# Patient Record
Sex: FEMALE | Race: WHITE | Employment: OTHER | URBAN - METROPOLITAN AREA
[De-identification: names, ages, dates, MRNs, and addresses within clinical notes are randomized per-mention and may not be internally consistent; named-entity substitution may affect disease eponyms.]

---

## 2017-03-31 PROBLEM — N95.2 ATROPHIC VAGINITIS: Status: ACTIVE | Noted: 2017-03-31

## 2017-04-04 ENCOUNTER — HOSPITAL ENCOUNTER (OUTPATIENT)
Dept: MAMMOGRAPHY | Age: 67
Discharge: HOME | End: 2017-04-04
Attending: OBSTETRICS & GYNECOLOGY
Payer: MEDICARE

## 2017-04-04 DIAGNOSIS — N63.21 BREAST LUMP ON LEFT SIDE AT 2 O'CLOCK POSITION: ICD-10-CM

## 2017-04-04 PROCEDURE — 77066 DX MAMMO INCL CAD BI: CPT

## 2017-04-04 PROCEDURE — 76642 ULTRASOUND BREAST LIMITED: CPT

## 2017-06-11 ENCOUNTER — HOSPITAL ENCOUNTER (OUTPATIENT)
Dept: SLEEP MEDICINE | Age: 67
Discharge: HOME | End: 2017-06-11
Payer: MEDICARE

## 2017-06-11 PROCEDURE — 95810 POLYSOM 6/> YRS 4/> PARAM: CPT

## 2017-08-02 PROBLEM — G47.30 SLEEP APNEA, UNSPECIFIED: Status: ACTIVE | Noted: 2017-08-02

## 2017-08-03 ENCOUNTER — HOSPITAL ENCOUNTER (OUTPATIENT)
Dept: CT IMAGING | Age: 67
Discharge: HOME | End: 2017-08-03
Attending: INTERNAL MEDICINE
Payer: SELF-PAY

## 2017-08-03 ENCOUNTER — HOSPITAL ENCOUNTER (OUTPATIENT)
Dept: ULTRASOUND IMAGING | Age: 67
Discharge: HOME | End: 2017-08-03
Attending: INTERNAL MEDICINE
Payer: MEDICARE

## 2017-08-03 DIAGNOSIS — R10.11 RUQ PAIN: ICD-10-CM

## 2017-08-03 DIAGNOSIS — Z91.89 CARDIOVASCULAR RISK FACTOR: ICD-10-CM

## 2017-08-03 PROCEDURE — 75571 CT HRT W/O DYE W/CA TEST: CPT

## 2017-08-03 PROCEDURE — 76700 US EXAM ABDOM COMPLETE: CPT

## 2017-08-07 NOTE — PROGRESS NOTES
Let her know coronary calcium score 101. Just above 100. Suggest seeing a cardiologist-suggest seeing [facility] Dr. Briseida Rodriguez even if new appt takes month. He will likely advise her to take a lifelong cholesterol medication since her LDL is moderately elevated. Can I refer her?

## 2017-08-14 NOTE — PROGRESS NOTES
Spoke to patient, message was relayed and understanding was expressed. Patient will discuss referral at her follow up appointment later this month.

## 2017-08-18 PROBLEM — G47.30 SLEEP APNEA, UNSPECIFIED: Status: RESOLVED | Noted: 2017-08-02 | Resolved: 2017-08-18

## 2017-11-22 ENCOUNTER — HOSPITAL ENCOUNTER (OUTPATIENT)
Dept: LAB | Age: 67
Discharge: HOME | End: 2017-11-22

## 2017-11-22 PROCEDURE — 88305 TISSUE EXAM BY PATHOLOGIST: CPT | Performed by: INTERNAL MEDICINE

## 2017-11-22 PROCEDURE — 88312 SPECIAL STAINS GROUP 1: CPT | Performed by: INTERNAL MEDICINE

## 2017-12-08 ENCOUNTER — HOSPITAL ENCOUNTER (OUTPATIENT)
Dept: CT IMAGING | Age: 67
Discharge: HOME | End: 2017-12-08
Attending: INTERNAL MEDICINE
Payer: MEDICARE

## 2017-12-08 DIAGNOSIS — R10.9 LEFT SIDED ABDOMINAL PAIN: ICD-10-CM

## 2017-12-08 DIAGNOSIS — R10.13 EPIGASTRIC PAIN: ICD-10-CM

## 2017-12-08 DIAGNOSIS — R63.4 WEIGHT LOSS: ICD-10-CM

## 2017-12-08 PROCEDURE — 74011636320 HC RX REV CODE- 636/320: Performed by: INTERNAL MEDICINE

## 2017-12-08 PROCEDURE — 74011000258 HC RX REV CODE- 258: Performed by: INTERNAL MEDICINE

## 2017-12-08 PROCEDURE — 74177 CT ABD & PELVIS W/CONTRAST: CPT

## 2017-12-08 RX ORDER — SODIUM CHLORIDE 0.9 % (FLUSH) 0.9 %
10 SYRINGE (ML) INJECTION
Status: COMPLETED | OUTPATIENT
Start: 2017-12-08 | End: 2017-12-08

## 2017-12-08 RX ADMIN — SODIUM CHLORIDE 100 ML: 900 INJECTION, SOLUTION INTRAVENOUS at 14:50

## 2017-12-08 RX ADMIN — DIATRIZOATE MEGLUMINE AND DIATRIZOATE SODIUM 15 ML: 660; 100 LIQUID ORAL; RECTAL at 14:50

## 2017-12-08 RX ADMIN — IOPAMIDOL 100 ML: 755 INJECTION, SOLUTION INTRAVENOUS at 14:50

## 2017-12-08 RX ADMIN — Medication 10 ML: at 14:50

## 2017-12-18 ENCOUNTER — HOSPITAL ENCOUNTER (OUTPATIENT)
Dept: ULTRASOUND IMAGING | Age: 67
Discharge: HOME | End: 2017-12-18
Attending: INTERNAL MEDICINE
Payer: MEDICARE

## 2017-12-18 DIAGNOSIS — R42 DIZZINESS: ICD-10-CM

## 2017-12-18 PROCEDURE — 93880 EXTRACRANIAL BILAT STUDY: CPT

## 2017-12-18 NOTE — PROGRESS NOTES
Spoke topatient, message was relayed and understanding expressed. Patient states that she is on her fourth day of feeling better, she is finally on her way to feeling 100%.

## 2017-12-27 PROBLEM — I25.119 CORONARY ARTERY DISEASE INVOLVING NATIVE CORONARY ARTERY OF NATIVE HEART WITH ANGINA PECTORIS (HCC): Status: ACTIVE | Noted: 2017-12-27

## 2017-12-27 PROBLEM — R53.82 CHRONIC FATIGUE: Status: ACTIVE | Noted: 2017-12-27

## 2018-01-16 PROBLEM — I34.1 MVP (MITRAL VALVE PROLAPSE): Status: ACTIVE | Noted: 2018-01-16

## 2018-01-16 PROBLEM — I25.10 CORONARY ARTERY DISEASE INVOLVING NATIVE CORONARY ARTERY OF NATIVE HEART WITHOUT ANGINA PECTORIS: Status: ACTIVE | Noted: 2017-12-27

## 2018-04-26 ENCOUNTER — APPOINTMENT (RX ONLY)
Dept: URBAN - METROPOLITAN AREA CLINIC 349 | Facility: CLINIC | Age: 68
Setting detail: DERMATOLOGY
End: 2018-04-26

## 2018-04-26 DIAGNOSIS — L82.1 OTHER SEBORRHEIC KERATOSIS: ICD-10-CM

## 2018-04-26 DIAGNOSIS — L71.8 OTHER ROSACEA: ICD-10-CM

## 2018-04-26 DIAGNOSIS — Z71.89 OTHER SPECIFIED COUNSELING: ICD-10-CM

## 2018-04-26 DIAGNOSIS — L82.0 INFLAMED SEBORRHEIC KERATOSIS: ICD-10-CM

## 2018-04-26 DIAGNOSIS — Z85.828 PERSONAL HISTORY OF OTHER MALIGNANT NEOPLASM OF SKIN: ICD-10-CM

## 2018-04-26 PROCEDURE — 17110 DESTRUCTION B9 LES UP TO 14: CPT

## 2018-04-26 PROCEDURE — ? COUNSELING

## 2018-04-26 PROCEDURE — ? PRESCRIPTION

## 2018-04-26 PROCEDURE — ? LIQUID NITROGEN

## 2018-04-26 PROCEDURE — 99202 OFFICE O/P NEW SF 15 MIN: CPT | Mod: 25

## 2018-04-26 RX ORDER — METRONIDAZOLE 7.5 MG/G
CREAM TOPICAL
Qty: 1 | Refills: 4 | Status: ERX

## 2018-04-26 ASSESSMENT — LOCATION SIMPLE DESCRIPTION DERM
LOCATION SIMPLE: LEFT POSTERIOR THIGH
LOCATION SIMPLE: LEFT LOWER BACK
LOCATION SIMPLE: ABDOMEN
LOCATION SIMPLE: RIGHT POSTERIOR THIGH
LOCATION SIMPLE: LEFT THIGH
LOCATION SIMPLE: RIGHT UPPER BACK
LOCATION SIMPLE: LEFT UPPER BACK
LOCATION SIMPLE: LEFT CHEEK
LOCATION SIMPLE: RIGHT THIGH
LOCATION SIMPLE: NOSE
LOCATION SIMPLE: RIGHT FOREARM
LOCATION SIMPLE: ABDOMEN
LOCATION SIMPLE: LEFT THIGH
LOCATION SIMPLE: RIGHT CHEEK
LOCATION SIMPLE: RIGHT CHEEK
LOCATION SIMPLE: RIGHT THIGH
LOCATION SIMPLE: LEFT POSTERIOR THIGH
LOCATION SIMPLE: RIGHT FOREARM

## 2018-04-26 ASSESSMENT — LOCATION DETAILED DESCRIPTION DERM
LOCATION DETAILED: RIGHT VENTRAL PROXIMAL FOREARM
LOCATION DETAILED: NASAL DORSUM
LOCATION DETAILED: EPIGASTRIC SKIN
LOCATION DETAILED: LEFT SUPERIOR MEDIAL MIDBACK
LOCATION DETAILED: RIGHT DISTAL POSTERIOR THIGH
LOCATION DETAILED: LEFT INFERIOR CENTRAL MALAR CHEEK
LOCATION DETAILED: RIGHT MEDIAL UPPER BACK
LOCATION DETAILED: RIGHT VENTRAL PROXIMAL FOREARM
LOCATION DETAILED: LEFT MID-UPPER BACK
LOCATION DETAILED: LEFT ANTERIOR PROXIMAL THIGH
LOCATION DETAILED: LEFT ANTERIOR PROXIMAL THIGH
LOCATION DETAILED: LEFT PROXIMAL POSTERIOR THIGH
LOCATION DETAILED: LEFT PROXIMAL POSTERIOR THIGH
LOCATION DETAILED: RIGHT ANTERIOR PROXIMAL THIGH
LOCATION DETAILED: EPIGASTRIC SKIN
LOCATION DETAILED: RIGHT LATERAL BUCCAL CHEEK
LOCATION DETAILED: RIGHT MEDIAL MALAR CHEEK
LOCATION DETAILED: RIGHT ANTERIOR PROXIMAL THIGH

## 2018-04-26 ASSESSMENT — LOCATION ZONE DERM
LOCATION ZONE: LEG
LOCATION ZONE: TRUNK
LOCATION ZONE: FACE
LOCATION ZONE: FACE
LOCATION ZONE: NOSE
LOCATION ZONE: ARM
LOCATION ZONE: LEG
LOCATION ZONE: ARM
LOCATION ZONE: TRUNK

## 2018-04-26 NOTE — PROCEDURE: LIQUID NITROGEN
Number Of Freeze-Thaw Cycles: 1 freeze-thaw cycle
Render Post-Care Instructions In Note?: no
Detail Level: Detailed
Include Z78.9 (Other Specified Conditions Influencing Health Status) As An Associated Diagnosis?: Yes
Medical Necessity Clause: This procedure was medically necessary because the lesions that were treated were:
Post-Care Instructions: I reviewed with the patient in detail post-care instructions. Patient is to wear sunprotection, and avoid picking at any of the treated lesions. Pt may apply Vaseline to crusted or scabbing areas.
Consent: The patient's consent was obtained including but not limited to risks of crusting, scabbing, blistering, scarring, darker or lighter pigmentary change, recurrence, incomplete removal and infection.
Medical Necessity Information: It is in your best interest to select a reason for this procedure from the list below. All of these items fulfill various CMS LCD requirements except the new and changing color options.

## 2018-06-01 ENCOUNTER — HOSPITAL ENCOUNTER (OUTPATIENT)
Dept: MAMMOGRAPHY | Age: 68
Discharge: HOME | End: 2018-06-01
Attending: OBSTETRICS & GYNECOLOGY
Payer: MEDICARE

## 2018-06-01 DIAGNOSIS — Z12.39 SCREENING FOR BREAST CANCER: ICD-10-CM

## 2018-06-01 PROCEDURE — 77067 SCR MAMMO BI INCL CAD: CPT

## 2018-09-17 PROBLEM — I72.9 ANEURYSM (HCC): Status: ACTIVE | Noted: 2018-09-17

## 2018-10-29 ENCOUNTER — HOSPITAL ENCOUNTER (OUTPATIENT)
Dept: PHYSICAL THERAPY | Age: 68
Discharge: HOME | End: 2018-10-29
Attending: INTERNAL MEDICINE
Payer: MEDICARE

## 2018-10-29 DIAGNOSIS — M54.2 CHRONIC NECK PAIN: ICD-10-CM

## 2018-10-29 DIAGNOSIS — G89.29 CHRONIC NECK PAIN: ICD-10-CM

## 2018-10-29 PROCEDURE — G8981 BODY POS CURRENT STATUS: HCPCS

## 2018-10-29 PROCEDURE — 97161 PT EVAL LOW COMPLEX 20 MIN: CPT

## 2018-10-29 PROCEDURE — G8982 BODY POS GOAL STATUS: HCPCS

## 2018-10-29 PROCEDURE — 97140 MANUAL THERAPY 1/> REGIONS: CPT

## 2018-10-29 NOTE — PROGRESS NOTES
Ambulatory/Rehab Services H2 Model Falls Risk Assessment    Risk Factor Pts. ·   Confusion/Disorientation/Impulsivity  []    4 ·   Symptomatic Depression  []   2 ·   Altered Elimination  []   1 ·   Dizziness/Vertigo  []   1 ·   Gender (Male)  []   1 ·   Any administered antiepileptics (anticonvulsants):  []   2 ·   Any administered benzodiazepines:  []   1 ·   Visual Impairment (specify):  []   1 ·   Portable Oxygen Use  []   1 ·   Orthostatic ? BP  []   1 ·   History of Recent Falls (within 3 mos.)  []   5     Ability to Rise from Chair (choose one) Pts. ·   Ability to rise in a single movement  [x]   0 ·   Pushes up, successful in one attempt  []   1 ·   Multiple attempts, but successful  []   3 ·   Unable to rise without assistance  []   4   Total: (5 or greater = High Risk) 0     Falls Prevention Plan:   []                Physical Limitations to Exercise (specify):   []                Mobility Assistance Device (type):   []                Exercise/Equipment Adaptation (specify):    ©2010 [facility] [address] Patent #[id number].  Federal Law prohibits the replication, distribution or use without written permission from [facility] [employer]

## 2018-10-29 NOTE — THERAPY EVALUATION
Emmanuelle Both  : 1950  [place] / Plan[place] PART [place] B / Product Type: Medicare /    [address] [facility], [address], [facility], [address] [place] [address]  [phone]   [phone]          OUTPATIENT PHYSICAL THERAPY:Initial Assessment 10/30/2018     ICD-10: Treatment Diagnosis: Pain in Left Shoulder (M25.512) Cervicalgia (M54.2)  Abnormal posture (R29.3)  thoracic pain     Precautions/Allergies:   Nickel   Fall Risk Score: 1 (? 5 = High Risk)  MD Orders: evaluate and treat MEDICAL/REFERRING DIAGNOSIS:  Chronic neck pain [M54.2, G89.29]  DATE OF ONSET: 6 months   REFERRING PHYSICIAN: Zia Wright MD  RETURN PHYSICIAN APPOINTMENT: not specified      INITIAL ASSESSMENT:  Ms. Claudetta Cope presents with functional limitations due to  Neck and mid back pain. PT evaluation reveals significant intervertebral joint stiffness, muscle imbalances and abnormal postural alinement. Pt responded well after manual techniques today and will highly benefit from skilled PT to address problems below and improve quality of life. PROBLEM LIST (Impacting functional limitations):  1. Decreased Strength  2. Increased Pain  3. Decreased Flexibility/Joint Mobility  4. Decreased Knowledge of Precautions  5. Decreased [place] with Home Exercise Program INTERVENTIONS PLANNED:  1. Cold  2. Heat  3. Home Exercise Program (HEP)  4. Manual Therapy  5. Neuromuscular Re-education/Strengthening  6. Range of Motion (ROM)  7. Therapeutic Exercise/Strengthening     TREATMENT PLAN:  Effective Dates: 10/29/2018 TO 2019 (90 days). Frequency/Duration: 2 times a week for 90 Days  GOALS: (Goals have been discussed and agreed upon with patient.)  SHORT-TERM FUNCTIONAL GOALS: Time Frame: 2-4 weeks   1. Pt will be independent with HEP focusing on core stability and promoting good spinal alignment.   2. Pt will report no symptoms of UE for 1-2 weeks demonstrating centralization of symptoms. 3. Pt will report pain level does not exceed 5/10 in a 1-2 week period of time to demonstrate pain management. DISCHARGE GOALS: Time Frame: 6-8 weeks   1. Pt will improve NDI score by at least 5 points   2. Pt will demonstrate pain free cervical rotation B without report of pain to improve functional mobility. 3. Pt will be able to sustain regular exercise routine including aerobic exercise 3+ times per week without increased symptoms. Rehabilitation Potential For Stated Goals: Good  Regarding Oc Byrnes's therapy, I certify that the treatment plan above will be carried out by a therapist or under their direction. Thank you for this referral,  Suraj Chew, PT     Referring Physician Signature: Valentin Long MD              Date                    The information in this section was collected on 10/29/18 (except where otherwise noted). HISTORY:   History of Present Injury/Illness (Reason for Referral):  Chronic mid thoracic pain for years (mostly right side) most recent MRI t-spine negative for abnormalities (per pt report). MD suggested chiropractor-- receiving weekly chiro adjustments with some thoracic pain relief. Pt states that 6 months ago, left sided neck pain onset. X-rays of cervical spine at chiropractor office showing mild OA, per patient. Chiropractor suggested muscular in nature and recommended PT due to no change in symptoms. Occasionally pain also radiates to left shoulder. Pt states that, 2 weeks ago, heard pop and pain intensified since then. Present symptoms (on day of initial evaluation): constant aching and sharp pain of left sided neck occasionally radiating into arm ; aching sharp pain of central mid thoracic   · Aggravating factors: PM worse, driving, turning head to right.    · Relieving factors: laying down, ice, support   · Pain level: 2/10 presently, 8/10 worst, 1-2/10 best     Past Medical History/Comorbidities:   Ms. Deysi Harrell  has a past medical history of Acne rosacea, Chronic insomnia, Chronic thoracic spine pain, Colon polyps, Coronary Calcium Score 101, Depression, major, single episode, mild ([address] Dyslipidemia, GERD (gastroesophageal reflux disease), Osteoarthritis, generalized, and Sigmoid diverticulosis. Ms. Claudetta Cope  has a past surgical history that includes hx  section (); hx colonoscopy (2016); and hx endoscopy (, ). Social History/Living Environment:     lives with  in two story home      Prior Level of Function/Work/Activity:  , driving, reaching, lifting moderately heavy boxes,     Dominant Side:         RIGHT  Other Clinical Tests:          MRI thoracic, x-rays   Previous Treatment Approaches:          Chiropractic   Current Medications:       Current Outpatient Medications:     ESTRACE 0.01 % (0.1 mg/gram) vaginal cream, Use 1 g vaginally 2-3 nights per week  Brand only please, Disp: 1 Tube, Rfl: 3    naproxen sodium (ALEVE) 220 mg tablet, Take 220 mg by mouth two (2) times daily as needed. , Disp: , Rfl:     lamoTRIgine (LAMICTAL) 25 mg tablet, Take 25 mg by mouth two (2) times a day., Disp: , Rfl:     aspirin delayed-release 81 mg tablet, Take  by mouth daily. , Disp: , Rfl:     zolpidem (AMBIEN) 10 mg tablet, Take 1 Tab by mouth nightly as needed for Sleep. Max Daily Amount: 10 mg., Disp: 30 Tab, Rfl: 3    melatonin tab tablet, Take  by mouth nightly., Disp: , Rfl:     hydrocortisone (PROCTOSOL HC) 2.5 % rectal cream, INSERT CREAM INTO RECTUM FOUR TIMES DAILY AS DIRECTED, Disp: 1 Tube, Rfl: 5    rosuvastatin (CRESTOR) 5 mg tablet, Take 1 Tab by mouth nightly. [facility] Fax - [phone]  , Disp: 90 Tab, Rfl: 3    raNITIdine (ZANTAC) 150 mg tablet, Take 150 mg by mouth., Disp: , Rfl:     metroNIDAZOLE (METROCREAM) 0.75 % topical cream, Apply  to affected area two (2) times a day.  Use a thin layer to affected areas after washing, Disp: 45 g, Rfl: 5    multivitamin (ONE A DAY) tablet, Take 1 Tab by mouth daily. , Disp: , Rfl:    Date Last Reviewed:  10/29/2018     Number of Personal Factors/Comorbidities that affect the Plan of Care: 0: LOW COMPLEXITY   EXAMINATION:   Observation/Orthostatic Postural Assessment:         Rounded shoulders, forward head, increased kyphosis noted of thoracic spine        Increased tone of left upper trap, compared to right    Palpation:      Increased tone and tenderness of left upper trap         ROM:     Eval Date: 10/29/18 Re-Assess date:   Joint:     Active ROM     Cervical Extension 50% cs    Cervical Flexion 75%    Cervical Sidebending R: 50% cs  L: 75%    Cervical Rotation R: 50% cs  L: 75%    PAIVMs  C7 stiff with SP left  mobilization     Shoulder Flexion L: mild limitation   R: WNL    Shoulder Abduction L: mild limitation   R: mild limitation          Thoracic Flexion 100%    Thoracic Extension 25%        Strength:    · B UE 4+/5    Neurological Screen:  Intact     Functional Mobility:         Gait/Ambulation:  Independent and safe        Transfers:  independent and safe      Body Structures Involved:  1. Thoracic Cage  2. Joints  3. Muscles Body Functions Affected:  1. Mental  2. Sensory/Pain  3. Neuromusculoskeletal  4. Movement Related Activities and Participation Affected:  1. General Tasks and Demands  2. Mobility  3. Self Care   Number of elements that affect the Plan of Care: 3: MODERATE COMPLEXITY   CLINICAL PRESENTATION:   Presentation: Evolving clinical presentation with changing clinical characteristics: MODERATE COMPLEXITY   CLINICAL DECISION MAKING:   Outcome Measure: Tool Used: Neck Disability Index (NDI)  Score:  Initial: 15/50  Most Recent: X/50 (Date: -- )   Interpretation of Score: The Neck Disability Index is a revised form of the Oswestry Low Back Pain Index and is designed to measure the activities of daily living in person's with neck pain. Each section is scored on a 0-5 scale, 5 representing the greatest disability.   The scores of each section are added together for a total score of 50. Score 0 1-10 11-20 21-30 31-40 41-49 50   Modifier CH CI CJ CK CL CM CN     ? Changing and Maintaining Body Position:     - CURRENT STATUS: CJ - 20%-39% impaired, limited or restricted    - GOAL STATUS: CI - 1%-19% impaired, limited or restricted    - D/C STATUS:  ---------------To be determined---------------    Medical Necessity:   · Patient is expected to demonstrate progress in strength and range of motion to increase independence with ADLs and recreational activities . Reason for Services/Other Comments:  · Patient continues to require modification of therapeutic interventions to increase complexity of exercises. Use of outcome tool(s) and clinical judgement create a POC that gives a: Clear prediction of patient's progress: LOW COMPLEXITY            TREATMENT:   (In addition to Assessment/Re-Assessment sessions the following treatments were rendered)  Pre-treatment Symptoms/Complaints:  See above. Pain: Initial:     2/10 Post Session:  2/10     [facility]    THERAPEUTIC EXERCISE: (0 minutes):  Exercises per grid below to improve mobility and strength. Required moderate visual, verbal and manual cues to promote proper body alignment, promote proper body posture and promote proper body mechanics. Progressed resistance, range and repetitions as indicated. Date:  10/29/18 Date:   Date:     Activity/Exercise Parameters Parameters Parameters   Education  POC                                            Manual Therapy Interventions: (10 minutes): Manual interventions performed to improve joint/soft tissue mobility and ROM to improve ability to perform ADLs. Patient responded well to all manual interventions with no significant increase in pain/symptoms. Improved pain reported below.        Technique Used Grade  Level # Time(s) Effect while being performed   Seated t-spine extension   III T8-T5 3 Audible cavitation and increased movement    Seated cervical snag  IV  C7 5 Increased cervical ROM        Treatment/Session Assessment:    · Response to Treatment:  Good understanding of HEP and POC. · Compliance with Program/Exercises: Will assess as treatment progresses. · Recommendations/Intent for next treatment session: \"Next visit will focus on advancements to more challenging activities\".     Future Appointments   Date Time Provider Mita Dumont   10/31/2018  9:30 AM Mildred Campo, PT [facility] [place] [facility]   11/5/2018 10:15 AM Mildred Campo, PT [facility]   11/7/2018  9:30 AM Mildred Campo, PT [facility]   11/12/2018 10:15 AM Mildred Campo, PT [facility]   11/14/2018 10:15 AM Mildred Campo, PT [facility]   11/19/2018  9:30 AM Mildred Campo, PT [facility]   11/21/2018 10:15 AM Mildred Campo, PT [facility]   11/26/2018 10:15 AM Mildred Campo, PT [facility]   11/28/2018 10:15 AM Mildred Campo, PT [facility]   12/14/2018 11:00 AM Olga Mcclain MD [place] [facility]   4/3/2019 10:20 AM Karl Ayala MD [place] [facility]       Total Treatment Duration: 10 min (+50 min evaluation)  PT Patient Time In/Time Out  Time In: 1025  Time Out: [address] [facility], PT

## 2018-10-31 ENCOUNTER — HOSPITAL ENCOUNTER (OUTPATIENT)
Dept: PHYSICAL THERAPY | Age: 68
Discharge: HOME | End: 2018-10-31
Payer: MEDICARE

## 2018-10-31 PROCEDURE — 97110 THERAPEUTIC EXERCISES: CPT

## 2018-10-31 NOTE — PROGRESS NOTES
Marknicjeremias Guzman  : 1950  [place] / [place] / Product Type: Medicare /    Pam Castellon at [address] [place] [address]. [facility], [address], [facility], [address] [place] [address]  [phone]   [phone]          OUTPATIENT PHYSICAL THERAPY:Daily Note 10/31/2018     ICD-10: Treatment Diagnosis: Pain in Left Shoulder (M25.512) Cervicalgia (M54.2)  Abnormal posture (R29.3)  thoracic pain     Precautions/Allergies:   Nickel   Fall Risk Score: 1 (? 5 = High Risk)  MD Orders: evaluate and treat MEDICAL/REFERRING DIAGNOSIS:   Chronic neck pain  DATE OF ONSET: 6 months   REFERRING PHYSICIAN: Citlali Morales MD  RETURN PHYSICIAN APPOINTMENT: not specified      INITIAL ASSESSMENT:  Ms. Victorino Klinefelter presents with functional limitations due to  Neck and mid back pain. PT evaluation reveals significant intervertebral joint stiffness, muscle imbalances and abnormal postural alinement. Pt responded well after manual techniques today and will highly benefit from skilled PT to address problems below and improve quality of life. PROBLEM LIST (Impacting functional limitations):  1. Decreased Strength  2. Increased Pain  3. Decreased Flexibility/Joint Mobility  4. Decreased Knowledge of Precautions  5. Decreased Melba with Home Exercise Program INTERVENTIONS PLANNED:  1. Cold  2. Heat  3. Home Exercise Program (HEP)  4. Manual Therapy  5. Neuromuscular Re-education/Strengthening  6. Range of Motion (ROM)  7. Therapeutic Exercise/Strengthening     TREATMENT PLAN:  Effective Dates: 10/29/2018 TO 2019 (90 days). Frequency/Duration: 2 times a week for 90 Days  GOALS: (Goals have been discussed and agreed upon with patient.)  SHORT-TERM FUNCTIONAL GOALS: Time Frame: 2-4 weeks   1. Pt will be independent with HEP focusing on core stability and promoting good spinal alignment. 2. Pt will report no symptoms of UE for 1-2 weeks demonstrating centralization of symptoms.   3. Pt No answer. Voicemail is not set up.    will report pain level does not exceed 5/10 in a 1-2 week period of time to demonstrate pain management. DISCHARGE GOALS: Time Frame: 6-8 weeks   1. Pt will improve NDI score by at least 5 points   2. Pt will demonstrate pain free cervical rotation B without report of pain to improve functional mobility. 3. Pt will be able to sustain regular exercise routine including aerobic exercise 3+ times per week without increased symptoms. Rehabilitation Potential For Stated Goals: Good              The information in this section was collected on 10/29/18 (except where otherwise noted). HISTORY:   History of Present Injury/Illness (Reason for Referral):  Chronic mid thoracic pain for years (mostly right side) most recent MRI t-spine negative for abnormalities (per pt report). MD suggested chiropractor-- receiving weekly chiro adjustments with some thoracic pain relief. Pt states that 6 months ago, left sided neck pain onset. X-rays of cervical spine at chiropractor office showing mild OA, per patient. Chiropractor suggested muscular in nature and recommended PT due to no change in symptoms. Occasionally pain also radiates to left shoulder. Pt states that, 2 weeks ago, heard pop and pain intensified since then. Present symptoms (on day of initial evaluation): constant aching and sharp pain of left sided neck occasionally radiating into arm ; aching sharp pain of central mid thoracic   · Aggravating factors: PM worse, driving, turning head to right. · Relieving factors: laying down, ice, support   · Pain level: 2/10 presently, 8/10 worst, 1-2/10 best     Past Medical History/Comorbidities:   Ms. Christa Armijo  has a past medical history of Acne rosacea, Chronic insomnia, Chronic thoracic spine pain, Colon polyps, Coronary Calcium Score 101, Depression, major, single episode, mild ([address] Dyslipidemia, GERD (gastroesophageal reflux disease), Osteoarthritis, generalized, and Sigmoid diverticulosis.   Ms. Giacomo Mendoza  has a past surgical history that includes hx  section (); hx colonoscopy (2016); and hx endoscopy (, ). Social History/Living Environment:     lives with  in two story home      Prior Level of Function/Work/Activity:  , driving, reaching, lifting moderately heavy boxes,     Dominant Side:         RIGHT  Other Clinical Tests:          MRI thoracic, x-rays   Previous Treatment Approaches:          Chiropractic   Current Medications:       Current Outpatient Medications:     ESTRACE 0.01 % (0.1 mg/gram) vaginal cream, Use 1 g vaginally 2-3 nights per week  Brand only please, Disp: 1 Tube, Rfl: 3    naproxen sodium (ALEVE) 220 mg tablet, Take 220 mg by mouth two (2) times daily as needed. , Disp: , Rfl:     lamoTRIgine (LAMICTAL) 25 mg tablet, Take 25 mg by mouth two (2) times a day., Disp: , Rfl:     aspirin delayed-release 81 mg tablet, Take  by mouth daily. , Disp: , Rfl:     zolpidem (AMBIEN) 10 mg tablet, Take 1 Tab by mouth nightly as needed for Sleep. Max Daily Amount: 10 mg., Disp: 30 Tab, Rfl: 3    melatonin tab tablet, Take  by mouth nightly., Disp: , Rfl:     hydrocortisone (PROCTOSOL HC) 2.5 % rectal cream, INSERT CREAM INTO RECTUM FOUR TIMES DAILY AS DIRECTED, Disp: 1 Tube, Rfl: 5    rosuvastatin (CRESTOR) 5 mg tablet, Take 1 Tab by mouth nightly. [facility] Fax - [phone]  , Disp: 90 Tab, Rfl: 3    raNITIdine (ZANTAC) 150 mg tablet, Take 150 mg by mouth., Disp: , Rfl:     metroNIDAZOLE (METROCREAM) 0.75 % topical cream, Apply  to affected area two (2) times a day. Use a thin layer to affected areas after washing, Disp: 45 g, Rfl: 5    multivitamin (ONE A DAY) tablet, Take 1 Tab by mouth daily. , Disp: , Rfl:    Date Last Reviewed:  10/31/2018     EXAMINATION:   Observation/Orthostatic Postural Assessment:         Rounded shoulders, forward head, increased kyphosis noted of thoracic spine        Increased tone of left upper trap, compared to right    Palpation:      Increased tone and tenderness of left upper trap         ROM:     Eval Date: 10/29/18 Re-Assess date:   Joint:     Active ROM     Cervical Extension 50% cs    Cervical Flexion 75%    Cervical Sidebending R: 50% cs  L: 75%    Cervical Rotation R: 50% cs  L: 75%    PAIVMs  C7 stiff with SP left  mobilization     Shoulder Flexion L: mild limitation   R: WNL    Shoulder Abduction L: mild limitation   R: mild limitation          Thoracic Flexion 100%    Thoracic Extension 25%        Strength:    · B UE 4+/5    Neurological Screen:  Intact     Functional Mobility:         Gait/Ambulation:  Independent and safe        Transfers:  independent and safe      Outcome Measure: Tool Used: Neck Disability Index (NDI)  Score:  Initial: 15/50  Most Recent: X/50 (Date: -- )   Interpretation of Score: The Neck Disability Index is a revised form of the Oswestry Low Back Pain Index and is designed to measure the activities of daily living in person's with neck pain. Each section is scored on a 0-5 scale, 5 representing the greatest disability. The scores of each section are added together for a total score of 50. Score 0 1-10 11-20 21-30 31-40 41-49 50   Modifier CH CI CJ CK CL CM CN     ? Changing and Maintaining Body Position:     - CURRENT STATUS: CJ - 20%-39% impaired, limited or restricted    - GOAL STATUS: CI - 1%-19% impaired, limited or restricted    - D/C STATUS:  ---------------To be determined---------------    Medical Necessity:   · Patient is expected to demonstrate progress in strength and range of motion to increase independence with ADLs and recreational activities . Reason for Services/Other Comments:  · Patient continues to require modification of therapeutic interventions to increase complexity of exercises.             TREATMENT:   (In addition to Assessment/Re-Assessment sessions the following treatments were rendered)  Pre-treatment Symptoms/Complaints:  Pt reports that she has minimal pain currently and was not very sore after last visit. Pain: Initial:     2/10 Post Session:  2/10     [employer] Interactive Portal    THERAPEUTIC EXERCISE: (5 minutes):  Exercises per grid below to improve mobility and strength. Required moderate visual, verbal and manual cues to promote proper body alignment, promote proper body posture and promote proper body mechanics. Progressed resistance, range and repetitions as indicated. Date:  10/29/18 Date:  10/31/18 Date:     Activity/Exercise Parameters Parameters Parameters   Education  POC     AROM cervical all planes   NV    AROM thoracic all planes   NV    Diaphragmatic breathing   X 5 min                         Manual Therapy Interventions: ( 40 minutes): Manual interventions performed to improve joint/soft tissue mobility and ROM to improve ability to perform ADLs. Patient responded well to all manual interventions with no significant increase in pain/symptoms. Improved pain reported below. Technique Used Grade  Level # Time(s) Effect while being performed   Seated t-spine extension   III Thoracic - cervical  3 bouts   increased movement    STM  Trigger points of B rhomboids and thoracic para spinals, B upper trap and levator scap   Decreased tone and pain                         Seated cervical snag  IV  C7 5 Increased cervical ROM    Modalities: 5 min   · Ice application thoracic region post treatment    Treatment/Session Assessment:  Pt challenged with relaxing muscles during manual treatment. Frequent cuing needed to breath and decrease muscle guard. Gave handout for diaphragmatic breathing exercise. · Response to Treatment:  Good understanding of HEP. No increased pain. Symmetrical cervcial ROM observed after manual techniques   · Compliance with Program/Exercises: Will assess as treatment progresses. · Recommendations/Intent for next treatment session:  \"Next visit will focus on advancements to more challenging activities\".     Future Appointments   Date Time Provider Mita Dumont   11/5/2018 10:15 AM Mildred Campo, PT [facility]   11/7/2018  9:30 AM Mildred Campo, PT [facility]   11/12/2018 10:15 AM Mildred Campo, PT [facility]   11/14/2018 10:15 AM Mildred Campo, PT [facility]   11/19/2018  9:30 AM Mildred Campo, PT [facility]   11/21/2018 10:15 AM Mildred Campo, PT [facility]   11/26/2018 10:15 AM Mildred Campo, PT [facility]   11/28/2018 10:15 AM Mildred Campo, PT [facility]   12/14/2018 11:00 AM Olga Mcclain MD [facility]   4/3/2019 10:20 AM Karl Marroquin MD [facility]       Total Treatment Duration: 50 min   PT Patient Time In/Time Out  Time In: 1127  [facility] [id number] [facility]

## 2018-11-05 ENCOUNTER — HOSPITAL ENCOUNTER (OUTPATIENT)
Dept: PHYSICAL THERAPY | Age: 68
Discharge: HOME | End: 2018-11-05
Payer: MEDICARE

## 2018-11-05 PROCEDURE — 97110 THERAPEUTIC EXERCISES: CPT

## 2018-11-05 PROCEDURE — 97140 MANUAL THERAPY 1/> REGIONS: CPT

## 2018-11-05 NOTE — PROGRESS NOTES
Bradne Danny  : 1950  [place] / [place] / Product Type: Medicare /    [facility] [address] [facility]. [facility], [address], [facility], [address] [place] [address]  [phone]   [phone]          OUTPATIENT PHYSICAL THERAPY:Daily Note 2018     ICD-10: Treatment Diagnosis: Pain in Left Shoulder (M25.512) Cervicalgia (M54.2)  Abnormal posture (R29.3)  thoracic pain     Precautions/Allergies:   Nickel   Fall Risk Score: 1 (? 5 = High Risk)  MD Orders: evaluate and treat MEDICAL/REFERRING DIAGNOSIS:   Chronic neck pain  DATE OF ONSET: 6 months   REFERRING PHYSICIAN: Wesly Ng MD  RETURN PHYSICIAN APPOINTMENT: not specified      INITIAL ASSESSMENT:  Ms. Marbin Hicks presents with functional limitations due to  Neck and mid back pain. PT evaluation reveals significant intervertebral joint stiffness, muscle imbalances and abnormal postural alinement. Pt responded well after manual techniques today and will highly benefit from skilled PT to address problems below and improve quality of life. PROBLEM LIST (Impacting functional limitations):  1. Decreased Strength  2. Increased Pain  3. Decreased Flexibility/Joint Mobility  4. Decreased Knowledge of Precautions  5. Decreased Turner with Home Exercise Program INTERVENTIONS PLANNED:  1. Cold  2. Heat  3. Home Exercise Program (HEP)  4. Manual Therapy  5. Neuromuscular Re-education/Strengthening  6. Range of Motion (ROM)  7. Therapeutic Exercise/Strengthening     TREATMENT PLAN:  Effective Dates: 10/29/2018 TO 2019 (90 days). Frequency/Duration: 2 times a week for 90 Days  GOALS: (Goals have been discussed and agreed upon with patient.)  SHORT-TERM FUNCTIONAL GOALS: Time Frame: 2-4 weeks   1. Pt will be independent with HEP focusing on core stability and promoting good spinal alignment. 2. Pt will report no symptoms of UE for 1-2 weeks demonstrating centralization of symptoms.   3. Pt will report pain level does not exceed 5/10 in a 1-2 week period of time to demonstrate pain management. DISCHARGE GOALS: Time Frame: 6-8 weeks   1. Pt will improve NDI score by at least 5 points   2. Pt will demonstrate pain free cervical rotation B without report of pain to improve functional mobility. 3. Pt will be able to sustain regular exercise routine including aerobic exercise 3+ times per week without increased symptoms. Rehabilitation Potential For Stated Goals: Good              The information in this section was collected on 10/29/18 (except where otherwise noted). HISTORY:   History of Present Injury/Illness (Reason for Referral):  Chronic mid thoracic pain for years (mostly right side) most recent MRI t-spine negative for abnormalities (per pt report). MD suggested chiropractor-- receiving weekly chiro adjustments with some thoracic pain relief. Pt states that 6 months ago, left sided neck pain onset. X-rays of cervical spine at chiropractor office showing mild OA, per patient. Chiropractor suggested muscular in nature and recommended PT due to no change in symptoms. Occasionally pain also radiates to left shoulder. Pt states that, 2 weeks ago, heard pop and pain intensified since then. Present symptoms (on day of initial evaluation): constant aching and sharp pain of left sided neck occasionally radiating into arm ; aching sharp pain of central mid thoracic   · Aggravating factors: PM worse, driving, turning head to right. · Relieving factors: laying down, ice, support   · Pain level: 2/10 presently, 8/10 worst, 1-2/10 best     Past Medical History/Comorbidities:   Ms. Vicie Duane  has a past medical history of Acne rosacea, Chronic insomnia, Chronic thoracic spine pain, Colon polyps, Coronary Calcium Score 101, Depression, major, single episode, mild ([address] Dyslipidemia, GERD (gastroesophageal reflux disease), Osteoarthritis, generalized, and Sigmoid diverticulosis.   Ms. Kamila Montalvo  has a past surgical history that includes hx  section (); hx colonoscopy (2016); and hx endoscopy (, ). Social History/Living Environment:     lives with  in two story home      Prior Level of Function/Work/Activity:  , driving, reaching, lifting moderately heavy boxes,     Dominant Side:         RIGHT  Other Clinical Tests:          MRI thoracic, x-rays   Previous Treatment Approaches:          Chiropractic   Current Medications:       Current Outpatient Medications:     ESTRACE 0.01 % (0.1 mg/gram) vaginal cream, Use 1 g vaginally 2-3 nights per week  Brand only please, Disp: 1 Tube, Rfl: 3    naproxen sodium (ALEVE) 220 mg tablet, Take 220 mg by mouth two (2) times daily as needed. , Disp: , Rfl:     lamoTRIgine (LAMICTAL) 25 mg tablet, Take 25 mg by mouth two (2) times a day., Disp: , Rfl:     aspirin delayed-release 81 mg tablet, Take  by mouth daily. , Disp: , Rfl:     zolpidem (AMBIEN) 10 mg tablet, Take 1 Tab by mouth nightly as needed for Sleep. Max Daily Amount: 10 mg., Disp: 30 Tab, Rfl: 3    melatonin tab tablet, Take  by mouth nightly., Disp: , Rfl:     hydrocortisone (PROCTOSOL HC) 2.5 % rectal cream, INSERT CREAM INTO RECTUM FOUR TIMES DAILY AS DIRECTED, Disp: 1 Tube, Rfl: 5    rosuvastatin (CRESTOR) 5 mg tablet, Take 1 Tab by mouth nightly. [facility] Fax - [phone]  , Disp: 90 Tab, Rfl: 3    raNITIdine (ZANTAC) 150 mg tablet, Take 150 mg by mouth., Disp: , Rfl:     metroNIDAZOLE (METROCREAM) 0.75 % topical cream, Apply  to affected area two (2) times a day. Use a thin layer to affected areas after washing, Disp: 45 g, Rfl: 5    multivitamin (ONE A DAY) tablet, Take 1 Tab by mouth daily. , Disp: , Rfl:    Date Last Reviewed:  2018     EXAMINATION:   Observation/Orthostatic Postural Assessment:         Rounded shoulders, forward head, increased kyphosis noted of thoracic spine        Increased tone of left upper trap, compared to right    Palpation:      Increased tone and tenderness of left upper trap         ROM:     Eval Date: 10/29/18 Re-Assess date:   Joint:     Active ROM     Cervical Extension 50% cs    Cervical Flexion 75%    Cervical Sidebending R: 50% cs  L: 75%    Cervical Rotation R: 50% cs  L: 75%    PAIVMs  C7 stiff with SP left  mobilization     Shoulder Flexion L: mild limitation   R: WNL    Shoulder Abduction L: mild limitation   R: mild limitation          Thoracic Flexion 100%    Thoracic Extension 25%        Strength:    · B UE 4+/5    Neurological Screen:  Intact     Functional Mobility:         Gait/Ambulation:  Independent and safe        Transfers:  independent and safe      Outcome Measure: Tool Used: Neck Disability Index (NDI)  Score:  Initial: 15/50  Most Recent: X/50 (Date: -- )   Interpretation of Score: The Neck Disability Index is a revised form of the Oswestry Low Back Pain Index and is designed to measure the activities of daily living in person's with neck pain. Each section is scored on a 0-5 scale, 5 representing the greatest disability. The scores of each section are added together for a total score of 50. Score 0 1-10 11-20 21-30 31-40 41-49 50   Modifier CH CI CJ CK CL CM CN     ? Changing and Maintaining Body Position:     - CURRENT STATUS: CJ - 20%-39% impaired, limited or restricted    - GOAL STATUS: CI - 1%-19% impaired, limited or restricted    - D/C STATUS:  ---------------To be determined---------------    Medical Necessity:   · Patient is expected to demonstrate progress in strength and range of motion to increase independence with ADLs and recreational activities . Reason for Services/Other Comments:  · Patient continues to require modification of therapeutic interventions to increase complexity of exercises.             TREATMENT:   (In addition to Assessment/Re-Assessment sessions the following treatments were rendered)  Pre-treatment Symptoms/Complaints: pt states that she was not sore after last session however, over the last 2 days she has had increased left side neck pain. She had to take pain medication this morning. Pain: Initial:     3/10 Post Session:  0/10     [employer] Portal    THERAPEUTIC EXERCISE: (15 minutes):  Exercises per grid below to improve mobility and strength. Required moderate visual, verbal and manual cues to promote proper body alignment, promote proper body posture and promote proper body mechanics. Progressed resistance, range and repetitions as indicated. Date:  10/29/18 Date:  10/31/18 Date:  11/5/18   Activity/Exercise Parameters Parameters Parameters   Education  POC  cause of scalene strain related to stress breathing    AROM cervical all planes   NV NV   AROM thoracic all planes   NV Flex/ext x 10 with guidance    Diaphragmatic breathing   X 5 min X 10 min    UBE   Level 1x 3/3                Access Code: [email or web address]   URL: [email or web address]   Date: 11/05/2018   Prepared by: Vikram Wood     Exercises  Seated Thoracic Flexion and Extension - 5 reps - 5 hold - 2x daily       Manual Therapy Interventions: ( 40 minutes): Manual interventions performed to improve joint/soft tissue mobility and ROM to improve ability to perform ADLs. Patient responded well to all manual interventions with no significant increase in pain/symptoms. Improved pain reported below.        Technique Used Grade  Level # Time(s) Effect while being performed   CPA's  III Thoracic - cervical  3 bouts   increased movement    STM  Trigger points of B rhomboids and thoracic para spinals, B upper trap and levator scap, focusing on left scalenes    Decreased tone and pain    Cervical traction   Mid-lower cerv  Decreased stiffness                  Seated cervical snag        Modalities: 10 min   · Ice application cerv and thoracic region post treatment    Treatment/Session Assessment: signifcant triggerpoints of left scalenes noted today ---focused manual here today.   · Response to Treatment:  Decreased pain level and improved cervical mobility    · Compliance with Program/Exercises: Will assess as treatment progresses. · Recommendations/Intent for next treatment session: \"Next visit will focus on advancements to more challenging activities\".     Future Appointments   Date Time Provider Mita Dumont   11/7/2018  9:30 AM [facility] Paula[facility]   11/12/2018 10:15 AM [facility] Paula[facility]   11/14/2018 10:15 AM Kiesha Paula, PT [facility]   11/19/2018  9:30 AM Kiesha Paula, PT [facility]   11/21/2018 10:15 AM Kiesha Paula, PT [facility]   11/26/2018 10:15 AM Kiesha Paula, PT [facility]   11/28/2018 10:15 AM Kiesha Paula, PT [facility]   12/14/2018 11:00 AM Lamarr Lanes, MD [facility]   4/3/2019 10:20 AM Gia Marroquin MD [facility]       Total Treatment Duration: 55 min   PT Patient Time In/Time Out  Time In: 1020  Time Out: [address], PT

## 2018-11-07 NOTE — PROGRESS NOTES
Migdalia Tavares  : 1950  [place] / [place] / Product Type: Medicare /    [address]. [facility], [address], [facility], [address] [place] [address]  [phone]   [phone]          OUTPATIENT PHYSICAL THERAPY:Daily Note      ICD-10: Treatment Diagnosis: Pain in Left Shoulder (M25.512) Cervicalgia (M54.2)  Abnormal posture (R29.3)  thoracic pain     Precautions/Allergies:   Nickel   Fall Risk Score: 1 (? 5 = High Risk)  MD Orders: evaluate and treat MEDICAL/REFERRING DIAGNOSIS:   Chronic neck pain  DATE OF ONSET: 6 months   REFERRING PHYSICIAN: Janet Buck MD  RETURN PHYSICIAN APPOINTMENT: not specified      INITIAL ASSESSMENT:  Ms. Maki Hernandez presents with functional limitations due to  Neck and mid back pain. PT evaluation reveals significant intervertebral joint stiffness, muscle imbalances and abnormal postural alinement. Pt responded well after manual techniques today and will highly benefit from skilled PT to address problems below and improve quality of life. PROBLEM LIST (Impacting functional limitations):  1. Decreased Strength  2. Increased Pain  3. Decreased Flexibility/Joint Mobility  4. Decreased Knowledge of Precautions  5. Decreased [place] with Home Exercise Program INTERVENTIONS PLANNED:  1. Cold  2. Heat  3. Home Exercise Program (HEP)  4. Manual Therapy  5. Neuromuscular Re-education/Strengthening  6. Range of Motion (ROM)  7. Therapeutic Exercise/Strengthening     TREATMENT PLAN:  Effective Dates: 10/29/2018 TO 2019 (90 days). Frequency/Duration: 2 times a week for 90 Days  GOALS: (Goals have been discussed and agreed upon with patient.)  SHORT-TERM FUNCTIONAL GOALS: Time Frame: 2-4 weeks   1. Pt will be independent with HEP focusing on core stability and promoting good spinal alignment. 2. Pt will report no symptoms of UE for 1-2 weeks demonstrating centralization of symptoms.   3. Pt will report pain level does not exceed 5/10 in a 1-2 week period of time to demonstrate pain management. DISCHARGE GOALS: Time Frame: 6-8 weeks   1. Pt will improve NDI score by at least 5 points   2. Pt will demonstrate pain free cervical rotation B without report of pain to improve functional mobility. 3. Pt will be able to sustain regular exercise routine including aerobic exercise 3+ times per week without increased symptoms. Rehabilitation Potential For Stated Goals: Good              The information in this section was collected on 10/29/18 (except where otherwise noted). HISTORY:   History of Present Injury/Illness (Reason for Referral):  Chronic mid thoracic pain for years (mostly right side) most recent MRI t-spine negative for abnormalities (per pt report). MD suggested chiropractor-- receiving weekly chiro adjustments with some thoracic pain relief. Pt states that 6 months ago, left sided neck pain onset. X-rays of cervical spine at chiropractor office showing mild OA, per patient. Chiropractor suggested muscular in nature and recommended PT due to no change in symptoms. Occasionally pain also radiates to left shoulder. Pt states that, 2 weeks ago, heard pop and pain intensified since then. Present symptoms (on day of initial evaluation): constant aching and sharp pain of left sided neck occasionally radiating into arm ; aching sharp pain of central mid thoracic   · Aggravating factors: PM worse, driving, turning head to right.    · Relieving factors: laying down, ice, support   · Pain level: 2/10 presently, 8/10 worst, 1-2/10 best     Past Medical History/Comorbidities:   Ms. Jared Fernandez  has a past medical history of Acne rosacea, Chronic insomnia, Chronic neck pain, Chronic thoracic spine pain, Colon polyps, Coronary Calcium Score 101, Depression, major, single episode, mild ([address] Dyslipidemia, GERD (gastroesophageal reflux disease), Menopause, Osteoarthritis, generalized, Sigmoid diverticulosis, and Small intestinal bacterial overgrowth. Ms. Claudetta Cope  has a past surgical history that includes hx  section (); hx colonoscopy (2016); and hx endoscopy (, ). Social History/Living Environment:     lives with  in two story home      Prior Level of Function/Work/Activity:  , driving, reaching, lifting moderately heavy boxes,     Dominant Side:         RIGHT  Other Clinical Tests:          MRI thoracic, x-rays   Previous Treatment Approaches:          Chiropractic   Current Medications:       Current Outpatient Medications:     rosuvastatin (CRESTOR) 5 mg tablet, Take 1 Tab by mouth nightly. [facility] Fax - [phone]  , Disp: 90 Tab, Rfl: 0    FLUoxetine (PROZAC) 20 mg capsule, TAKE 1 CAPSULE BY MOUTH ONCE DAILY IN THE MORNING, Disp: , Rfl: 11    zolpidem (AMBIEN) 10 mg tablet, TAKE 1 TABLET BY MOUTH AT BEDTIME, Disp: 30 Tab, Rfl: 3    OTHER,NON-FORMULARY,, , Disp: , Rfl:     glucosamine/chondr guzman A sod (GLUCOSAMINE-CHONDROITIN) 750-600 mg tab, Take  by mouth., Disp: , Rfl:     COQ10, UBIQUINOL, PO, Take  by mouth., Disp: , Rfl:     cholecalciferol (VITAMIN D3) 1,000 unit cap, Take  by mouth daily. , Disp: , Rfl:     AZO CRANBERRY 450-30-50 mg-mg-million tab, Take  by mouth., Disp: , Rfl:     OTHER,NON-FORMULARY,, , Disp: , Rfl:     melatonin tab tablet, Take  by mouth nightly., Disp: , Rfl:     hydrocortisone (PROCTOSOL HC) 2.5 % rectal cream, INSERT CREAM INTO RECTUM FOUR TIMES DAILY AS DIRECTED, Disp: 1 Tube, Rfl: 5    metroNIDAZOLE (METROCREAM) 0.75 % topical cream, Apply  to affected area two (2) times a day. Use a thin layer to affected areas after washing, Disp: 45 g, Rfl: 5    multivitamin (ONE A DAY) tablet, Take 1 Tab by mouth daily. , Disp: , Rfl:    Date Last Reviewed:  2018     EXAMINATION:   Observation/Orthostatic Postural Assessment:         Rounded shoulders, forward head, increased kyphosis noted of thoracic spine        Increased tone of left upper trap, compared to right    Palpation:      Increased tone and tenderness of left upper trap         ROM:     Eval Date: 10/29/18 Re-Assess date:   Joint:     Active ROM     Cervical Extension 50% cs    Cervical Flexion 75%    Cervical Sidebending R: 50% cs  L: 75%    Cervical Rotation R: 50% cs  L: 75%    PAIVMs  C7 stiff with SP left  mobilization     Shoulder Flexion L: mild limitation   R: WNL    Shoulder Abduction L: mild limitation   R: mild limitation          Thoracic Flexion 100%    Thoracic Extension 25%        Strength:    · B UE 4+/5    Neurological Screen:  Intact     Functional Mobility:         Gait/Ambulation:  Independent and safe        Transfers:  independent and safe      Outcome Measure: Tool Used: Neck Disability Index (NDI)  Score:  Initial: 15/50  Most Recent: X/50 (Date: -- )   Interpretation of Score: The Neck Disability Index is a revised form of the Oswestry Low Back Pain Index and is designed to measure the activities of daily living in person's with neck pain. Each section is scored on a 0-5 scale, 5 representing the greatest disability. The scores of each section are added together for a total score of 50. Score 0 1-10 11-20 21-30 31-40 41-49 50   Modifier CH CI CJ CK CL CM CN     ? Changing and Maintaining Body Position:     - CURRENT STATUS: CJ - 20%-39% impaired, limited or restricted    - GOAL STATUS: CI - 1%-19% impaired, limited or restricted    - D/C STATUS:  ---------------To be determined---------------    Medical Necessity:   · Patient is expected to demonstrate progress in strength and range of motion to increase independence with ADLs and recreational activities . Reason for Services/Other Comments:  · Patient continues to require modification of therapeutic interventions to increase complexity of exercises.             TREATMENT:   (In addition to Assessment/Re-Assessment sessions the following treatments were rendered)  Pre-treatment Symptoms/Complaints:pt states that she felt great with less pain right after last session but then flared up later after she was up shopping a while. Pt reports pain has moved lower and to the left at thoracic region and is unsure why. Trying to restart her walking routine but 1 hour of walking made her left side of neck hurt. Pain: Initial:     3/10 Post Session:  0/10     [employer] Portal    THERAPEUTIC EXERCISE: (15 minutes):  Exercises per grid below to improve mobility and strength. Required moderate visual, verbal and manual cues to promote proper body alignment, promote proper body posture and promote proper body mechanics. Progressed resistance, range and repetitions as indicated. Date:  10/29/18 Date:  10/31/18 Date:  11/5/18 11/7/18   Activity/Exercise Parameters Parameters Parameters    Education  POC  cause of scalene strain related to stress breathing     AROM cervical all planes   NV NV    AROM thoracic all planes   NV Flex/ext x 10 with guidance  2 x 5    Diaphragmatic breathing   X 5 min X 10 min     UBE   Level 1x 3/3  Level 2 3/3                  Access Code: [email or web address]   URL: [email or web address]   Date: 11/05/2018   Prepared by: Kalin Venegas     Exercises  Seated Thoracic Flexion and Extension - 5 reps - 5 hold - 2x daily       Manual Therapy Interventions: ( 30 minutes): Manual interventions performed to improve joint/soft tissue mobility and ROM to improve ability to perform ADLs. Patient responded well to all manual interventions with no significant increase in pain/symptoms. Improved pain reported below.        Technique Used Grade  Level # Time(s) Effect while being performed   CPA's  III Thoracic - cervical  3 bouts   increased movement    STM  Trigger points of B rhomboids and thoracic para spinals, B upper trap and levator scap, focusing on left scalenes    Decreased tone and pain    Cervical traction   Mid-lower cerv  Decreased stiffness                  Seated cervical snag        Modalities: 10 min   · Ice application cerv and thoracic region post treatment    Treatment/Session Assessment: improved thoracic ROM with HEP today. Some cuing needed to promote proper mechanics. signifcant triggerpoints of left scalenes noted but less than last session-    I encouraged pt to continue her walking program but due to increasing symptoms, she needs to decrease time to 30 min instead of an hour. · Response to Treatment:  Decreased pain level and improved cervical mobility    · Compliance with Program/Exercises: Will assess as treatment progresses. · Recommendations/Intent for next treatment session: \"Next visit will focus on advancements to more challenging activities\".     Future Appointments   Date Time Provider Mita Julia   11/15/2019 10:30 AM Noel Parikh MD [facility]   6/8/2020  9:30 AM Jessica Marroquin MD [facility]       Total Treatment Duration: 55 min   PT Patient Time In/Time Out  Time In: 1020  Time Out: [address] [facility],5Th Floor, [facility],

## 2018-11-08 ENCOUNTER — HOSPITAL ENCOUNTER (OUTPATIENT)
Dept: PHYSICAL THERAPY | Age: 68
Discharge: HOME | End: 2018-11-08
Payer: MEDICARE

## 2018-11-08 PROCEDURE — 97110 THERAPEUTIC EXERCISES: CPT

## 2018-11-08 PROCEDURE — 97140 MANUAL THERAPY 1/> REGIONS: CPT

## 2018-11-12 ENCOUNTER — HOSPITAL ENCOUNTER (OUTPATIENT)
Dept: PHYSICAL THERAPY | Age: 68
Discharge: HOME | End: 2018-11-12
Payer: MEDICARE

## 2018-11-12 PROCEDURE — 97140 MANUAL THERAPY 1/> REGIONS: CPT

## 2018-11-12 PROCEDURE — 97110 THERAPEUTIC EXERCISES: CPT

## 2018-11-12 NOTE — PROGRESS NOTES
Cait Andrew  : 1950  [place] / [place] / Product Type: Medicare /    [address] [facility] at [address] [place] [address]. [facility], [address], [facility], [address] [place] [address]  [phone]   [phone]          OUTPATIENT PHYSICAL THERAPY:Daily Note 2018     ICD-10: Treatment Diagnosis: Pain in Left Shoulder (M25.512) Cervicalgia (M54.2)  Abnormal posture (R29.3)  thoracic pain     Precautions/Allergies:   Nickel   Fall Risk Score: 1 (? 5 = High Risk)  MD Orders: evaluate and treat MEDICAL/REFERRING DIAGNOSIS:  Chronic neck pain  DATE OF ONSET: 6 months   REFERRING PHYSICIAN: Lili Danielle MD  RETURN PHYSICIAN APPOINTMENT: not specified      INITIAL ASSESSMENT:  Ms. Shruthi Lay presents with functional limitations due to  Neck and mid back pain. PT evaluation reveals significant intervertebral joint stiffness, muscle imbalances and abnormal postural alinement. Pt responded well after manual techniques today and will highly benefit from skilled PT to address problems below and improve quality of life. PROBLEM LIST (Impacting functional limitations):  1. Decreased Strength  2. Increased Pain  3. Decreased Flexibility/Joint Mobility  4. Decreased Knowledge of Precautions  5. Decreased [place] with Home Exercise Program INTERVENTIONS PLANNED:  1. Cold  2. Heat  3. Home Exercise Program (HEP)  4. Manual Therapy  5. Neuromuscular Re-education/Strengthening  6. Range of Motion (ROM)  7. Therapeutic Exercise/Strengthening     TREATMENT PLAN:  Effective Dates: 10/29/2018 TO 2019 (90 days). Frequency/Duration: 2 times a week for 90 Days  GOALS: (Goals have been discussed and agreed upon with patient.)  SHORT-TERM FUNCTIONAL GOALS: Time Frame: 2-4 weeks   1. Pt will be independent with HEP focusing on core stability and promoting good spinal alignment. 2. Pt will report no symptoms of UE for 1-2 weeks demonstrating centralization of symptoms.   3. Pt will report pain level does not exceed 5/10 in a 1-2 week period of time to demonstrate pain management. DISCHARGE GOALS: Time Frame: 6-8 weeks   1. Pt will improve NDI score by at least 5 points   2. Pt will demonstrate pain free cervical rotation B without report of pain to improve functional mobility. 3. Pt will be able to sustain regular exercise routine including aerobic exercise 3+ times per week without increased symptoms. Rehabilitation Potential For Stated Goals: Good              The information in this section was collected on 10/29/18 (except where otherwise noted). HISTORY:   History of Present Injury/Illness (Reason for Referral):  Chronic mid thoracic pain for years (mostly right side) most recent MRI t-spine negative for abnormalities (per pt report). MD suggested chiropractor-- receiving weekly chiro adjustments with some thoracic pain relief. Pt states that 6 months ago, left sided neck pain onset. X-rays of cervical spine at chiropractor office showing mild OA, per patient. Chiropractor suggested muscular in nature and recommended PT due to no change in symptoms. Occasionally pain also radiates to left shoulder. Pt states that, 2 weeks ago, heard pop and pain intensified since then. Present symptoms (on day of initial evaluation): constant aching and sharp pain of left sided neck occasionally radiating into arm ; aching sharp pain of central mid thoracic   · Aggravating factors: PM worse, driving, turning head to right. · Relieving factors: laying down, ice, support   · Pain level: 2/10 presently, 8/10 worst, 1-2/10 best     Past Medical History/Comorbidities:   Ms. Melvin Patel  has a past medical history of Acne rosacea, Chronic insomnia, Chronic thoracic spine pain, Colon polyps, Coronary Calcium Score 101, Depression, major, single episode, mild ([facility] [address] Dyslipidemia, GERD (gastroesophageal reflux disease), Osteoarthritis, generalized, and Sigmoid diverticulosis.   Ms. Marbin Hicks  has a past surgical history that includes hx  section (); hx colonoscopy (2016); and hx endoscopy (, ). Social History/Living Environment:     lives with  in two story home      Prior Level of Function/Work/Activity:  , driving, reaching, lifting moderately heavy boxes,     Dominant Side:         RIGHT  Other Clinical Tests:          MRI thoracic, x-rays   Previous Treatment Approaches:          Chiropractic   Current Medications:       Current Outpatient Medications:     fluconazole (DIFLUCAN) 150 mg tablet, Take 1 tablet po today then repeat in four days, Disp: 2 Tab, Rfl: 0    nystatin (MYCOSTATIN) topical cream, Apply  to affected area two (2) times a day., Disp: 15 g, Rfl: 0    ESTRACE 0.01 % (0.1 mg/gram) vaginal cream, Use 1 g vaginally 2-3 nights per week  Brand only please, Disp: 1 Tube, Rfl: 3    naproxen sodium (ALEVE) 220 mg tablet, Take 220 mg by mouth two (2) times daily as needed. , Disp: , Rfl:     lamoTRIgine (LAMICTAL) 25 mg tablet, Take 25 mg by mouth two (2) times a day., Disp: , Rfl:     aspirin delayed-release 81 mg tablet, Take  by mouth daily. , Disp: , Rfl:     zolpidem (AMBIEN) 10 mg tablet, Take 1 Tab by mouth nightly as needed for Sleep. Max Daily Amount: 10 mg., Disp: 30 Tab, Rfl: 3    melatonin tab tablet, Take  by mouth nightly., Disp: , Rfl:     hydrocortisone (PROCTOSOL HC) 2.5 % rectal cream, INSERT CREAM INTO RECTUM FOUR TIMES DAILY AS DIRECTED, Disp: 1 Tube, Rfl: 5    rosuvastatin (CRESTOR) 5 mg tablet, Take 1 Tab by mouth nightly. [facility] Fax - [phone]  , Disp: 90 Tab, Rfl: 3    raNITIdine (ZANTAC) 150 mg tablet, Take 150 mg by mouth., Disp: , Rfl:     metroNIDAZOLE (METROCREAM) 0.75 % topical cream, Apply  to affected area two (2) times a day. Use a thin layer to affected areas after washing, Disp: 45 g, Rfl: 5    multivitamin (ONE A DAY) tablet, Take 1 Tab by mouth daily. , Disp: , Rfl:    Date Last Reviewed:  11/12/2018     EXAMINATION:   Observation/Orthostatic Postural Assessment:         Rounded shoulders, forward head, increased kyphosis noted of thoracic spine        Increased tone of left upper trap, compared to right    Palpation:      Increased tone and tenderness of left upper trap         ROM:     Eval Date: 10/29/18 Re-Assess date:   Joint:     Active ROM     Cervical Extension 50% cs    Cervical Flexion 75%    Cervical Sidebending R: 50% cs  L: 75%    Cervical Rotation R: 50% cs  L: 75%    PAIVMs  C7 stiff with SP left  mobilization     Shoulder Flexion L: mild limitation   R: WNL    Shoulder Abduction L: mild limitation   R: mild limitation          Thoracic Flexion 100%    Thoracic Extension 25%        Strength:    · B UE 4+/5    Neurological Screen:  Intact     Functional Mobility:         Gait/Ambulation:  Independent and safe        Transfers:  independent and safe      Outcome Measure: Tool Used: Neck Disability Index (NDI)  Score:  Initial: 15/50  Most Recent: X/50 (Date: -- )   Interpretation of Score: The Neck Disability Index is a revised form of the Oswestry Low Back Pain Index and is designed to measure the activities of daily living in person's with neck pain. Each section is scored on a 0-5 scale, 5 representing the greatest disability. The scores of each section are added together for a total score of 50. Score 0 1-10 11-20 21-30 31-40 41-49 50   Modifier CH CI CJ CK CL CM CN     ? Changing and Maintaining Body Position:     - CURRENT STATUS: CJ - 20%-39% impaired, limited or restricted    - GOAL STATUS: CI - 1%-19% impaired, limited or restricted    - D/C STATUS:  ---------------To be determined---------------    Medical Necessity:   · Patient is expected to demonstrate progress in strength and range of motion to increase independence with ADLs and recreational activities .   Reason for Services/Other Comments:  · Patient continues to require modification of therapeutic interventions to increase complexity of exercises. TREATMENT:   (In addition to Assessment/Re-Assessment sessions the following treatments were rendered)  Pre-treatment Symptoms/Complaints:pt states that she felt pretty good after session and decided the next morning to perform some heavy house work. No pain during housework but few hours later had to take pain medication. Next day felt good--less than 24 hour recovery. Pain: Initial:     2/10 Post Session:  0/10     [employer] Portal    THERAPEUTIC EXERCISE: (25 minutes):  Exercises per grid below to improve mobility and strength. Required moderate visual, verbal and manual cues to promote proper body alignment, promote proper body posture and promote proper body mechanics. Progressed resistance, range and repetitions as indicated. Date:  10/29/18 Date:  10/31/18 Date:  11/5/18 11/12/18   Activity/Exercise Parameters Parameters Parameters    Education  POC  cause of scalene strain related to stress breathing     AROM cervical all planes   NV NV X 3, 10 sec all   AROM thoracic all planes   NV Flex/ext x 10 with guidance  X 5 F/E   Diaphragmatic breathing   X 5 min X 10 min  X 5 min   UBE   Level 1x 3/3  Level 3, 3/3                 Access Code: [email or web address]   URL: [email or web address]   Date: 11/05/2018   Prepared by: cAe Trejo          Manual Therapy Interventions: ( 35 minutes): Manual interventions performed to improve joint/soft tissue mobility and ROM to improve ability to perform ADLs. Patient responded well to all manual interventions with no significant increase in pain/symptoms. Improved pain reported below.        Technique Used Grade  Level # Time(s) Effect while being performed   CPA's  III Thoracic - cervical  3 bouts   increased movement    STM  Trigger points of B rhomboids and thoracic para spinals, B upper trap and levator scap, focusing on left scalenes    Decreased tone and pain    Cervical traction Mid-lower cerv  Decreased stiffness                  Seated cervical snag        Modalities: 10 min   · Ice application cerv and thoracic region post treatment    Treatment/Session Assessment: continued signifcant triggerpoints of left scalenes noted today. Pt required significant cuing for thoracic flex/ext again today. · Response to Treatment:  Decreased pain level and improved cervical mobility    · Compliance with Program/Exercises: Will assess as treatment progresses. · Recommendations/Intent for next treatment session: \"Next visit will focus on advancements to more challenging activities\".     Future Appointments   Date Time Provider Mita Dumont   11/14/2018 10:15 AM Kiesha Paula, PT [facility]   11/19/2018  9:30 AM Kiesha Paula, PT [facility]   11/21/2018 10:15 AM Kiesha Paula, PT [facility]   11/26/2018 10:15 AM Kiesha Paula, PT [facility]   11/28/2018 10:15 AM Kiesha Paula, PT [facility]   12/14/2018 11:00 AM Lamarr Lanes, MD [facility]   4/3/2019 10:20 AM Gia Marroquin MD [facility]       Total Treatment Duration: 65 min   PT Patient Time In/Time Out  Time In: 1020  Time Out: [address] [facility], PT

## 2018-11-14 ENCOUNTER — HOSPITAL ENCOUNTER (OUTPATIENT)
Dept: PHYSICAL THERAPY | Age: 68
Discharge: HOME | End: 2018-11-14
Payer: MEDICARE

## 2018-11-14 PROCEDURE — 97140 MANUAL THERAPY 1/> REGIONS: CPT

## 2018-11-14 PROCEDURE — 97110 THERAPEUTIC EXERCISES: CPT

## 2018-11-14 NOTE — PROGRESS NOTES
Eze Valadez  : 1950  [place] / [place] / Product Type: Medicare /    [facility] at [address] [place] [address]. [facility], [address], [facility], [address] [place] [address]  [phone]   [phone]          OUTPATIENT PHYSICAL THERAPY:Daily Note 2018     ICD-10: Treatment Diagnosis: Pain in Left Shoulder (M25.512) Cervicalgia (M54.2)  Abnormal posture (R29.3)  thoracic pain     Precautions/Allergies:   Nickel   Fall Risk Score: 1 (? 5 = High Risk)  MD Orders: evaluate and treat MEDICAL/REFERRING DIAGNOSIS:  Chronic neck pain  DATE OF ONSET: 6 months   REFERRING PHYSICIAN: Shoshana Moon MD  RETURN PHYSICIAN APPOINTMENT: not specified      INITIAL ASSESSMENT:  Ms. Heriberto Monet presents with functional limitations due to  Neck and mid back pain. PT evaluation reveals significant intervertebral joint stiffness, muscle imbalances and abnormal postural alinement. Pt responded well after manual techniques today and will highly benefit from skilled PT to address problems below and improve quality of life. PROBLEM LIST (Impacting functional limitations):  1. Decreased Strength  2. Increased Pain  3. Decreased Flexibility/Joint Mobility  4. Decreased Knowledge of Precautions  5. Decreased [place] with Home Exercise Program INTERVENTIONS PLANNED:  1. Cold  2. Heat  3. Home Exercise Program (HEP)  4. Manual Therapy  5. Neuromuscular Re-education/Strengthening  6. Range of Motion (ROM)  7. Therapeutic Exercise/Strengthening     TREATMENT PLAN:  Effective Dates: 10/29/2018 TO 2019 (90 days). Frequency/Duration: 2 times a week for 90 Days  GOALS: (Goals have been discussed and agreed upon with patient.)  SHORT-TERM FUNCTIONAL GOALS: Time Frame: 2-4 weeks   1. Pt will be independent with HEP focusing on core stability and promoting good spinal alignment. 2. Pt will report no symptoms of UE for 1-2 weeks demonstrating centralization of symptoms.   3. Pt will report pain level does not exceed 5/10 in a 1-2 week period of time to demonstrate pain management. DISCHARGE GOALS: Time Frame: 6-8 weeks   1. Pt will improve NDI score by at least 5 points   2. Pt will demonstrate pain free cervical rotation B without report of pain to improve functional mobility. 3. Pt will be able to sustain regular exercise routine including aerobic exercise 3+ times per week without increased symptoms. Rehabilitation Potential For Stated Goals: Good              The information in this section was collected on 10/29/18 (except where otherwise noted). HISTORY:   History of Present Injury/Illness (Reason for Referral):  Chronic mid thoracic pain for years (mostly right side) most recent MRI t-spine negative for abnormalities (per pt report). MD suggested chiropractor-- receiving weekly chiro adjustments with some thoracic pain relief. Pt states that 6 months ago, left sided neck pain onset. X-rays of cervical spine at chiropractor office showing mild OA, per patient. Chiropractor suggested muscular in nature and recommended PT due to no change in symptoms. Occasionally pain also radiates to left shoulder. Pt states that, 2 weeks ago, heard pop and pain intensified since then. Present symptoms (on day of initial evaluation): constant aching and sharp pain of left sided neck occasionally radiating into arm ; aching sharp pain of central mid thoracic   · Aggravating factors: PM worse, driving, turning head to right. · Relieving factors: laying down, ice, support   · Pain level: 2/10 presently, 8/10 worst, 1-2/10 best     Past Medical History/Comorbidities:   Ms. Ioana Cruz  has a past medical history of Acne rosacea, Chronic insomnia, Chronic thoracic spine pain, Colon polyps, Coronary Calcium Score 101, Depression, major, single episode, mild ([facility] [address] Dyslipidemia, GERD (gastroesophageal reflux disease), Osteoarthritis, generalized, and Sigmoid diverticulosis.   Ms. Fadumo Pink  has a past surgical history that includes hx  section (); hx colonoscopy (2016); and hx endoscopy (, ). Social History/Living Environment:     lives with  in two story home      Prior Level of Function/Work/Activity:  , driving, reaching, lifting moderately heavy boxes,     Dominant Side:         RIGHT  Other Clinical Tests:          MRI thoracic, x-rays   Previous Treatment Approaches:          Chiropractic   Current Medications:       Current Outpatient Medications:     zolpidem (AMBIEN) 10 mg tablet, Take 1 Tab by mouth nightly as needed for Sleep. Max Daily Amount: 10 mg., Disp: 30 Tab, Rfl: 3    fluconazole (DIFLUCAN) 150 mg tablet, Take 1 tablet po today then repeat in four days, Disp: 2 Tab, Rfl: 0    nystatin (MYCOSTATIN) topical cream, Apply  to affected area two (2) times a day., Disp: 15 g, Rfl: 0    ESTRACE 0.01 % (0.1 mg/gram) vaginal cream, Use 1 g vaginally 2-3 nights per week  Brand only please, Disp: 1 Tube, Rfl: 3    naproxen sodium (ALEVE) 220 mg tablet, Take 220 mg by mouth two (2) times daily as needed. , Disp: , Rfl:     lamoTRIgine (LAMICTAL) 25 mg tablet, Take 25 mg by mouth two (2) times a day., Disp: , Rfl:     aspirin delayed-release 81 mg tablet, Take  by mouth daily. , Disp: , Rfl:     melatonin tab tablet, Take  by mouth nightly., Disp: , Rfl:     hydrocortisone (PROCTOSOL HC) 2.5 % rectal cream, INSERT CREAM INTO RECTUM FOUR TIMES DAILY AS DIRECTED, Disp: 1 Tube, Rfl: 5    rosuvastatin (CRESTOR) 5 mg tablet, Take 1 Tab by mouth nightly. [facility] Fax - [phone]  , Disp: 90 Tab, Rfl: 3    raNITIdine (ZANTAC) 150 mg tablet, Take 150 mg by mouth., Disp: , Rfl:     metroNIDAZOLE (METROCREAM) 0.75 % topical cream, Apply  to affected area two (2) times a day. Use a thin layer to affected areas after washing, Disp: 45 g, Rfl: 5    multivitamin (ONE A DAY) tablet, Take 1 Tab by mouth daily. , Disp: , Rfl:    Date Last Reviewed:  11/14/2018     EXAMINATION:   Observation/Orthostatic Postural Assessment:         Rounded shoulders, forward head, increased kyphosis noted of thoracic spine        Increased tone of left upper trap, compared to right    Palpation:      Increased tone and tenderness of left upper trap         ROM:     Eval Date: 10/29/18 Re-Assess date:   Joint:     Active ROM     Cervical Extension 50% cs    Cervical Flexion 75%    Cervical Sidebending R: 50% cs  L: 75%    Cervical Rotation R: 50% cs  L: 75%    PAIVMs  C7 stiff with SP left  mobilization     Shoulder Flexion L: mild limitation   R: WNL    Shoulder Abduction L: mild limitation   R: mild limitation          Thoracic Flexion 100%    Thoracic Extension 25%        Strength:    · B UE 4+/5    Neurological Screen:  Intact     Functional Mobility:         Gait/Ambulation:  Independent and safe        Transfers:  independent and safe      Outcome Measure: Tool Used: Neck Disability Index (NDI)  Score:  Initial: 15/50  Most Recent: X/50 (Date: -- )   Interpretation of Score: The Neck Disability Index is a revised form of the Oswestry Low Back Pain Index and is designed to measure the activities of daily living in person's with neck pain. Each section is scored on a 0-5 scale, 5 representing the greatest disability. The scores of each section are added together for a total score of 50. Score 0 1-10 11-20 21-30 31-40 41-49 50   Modifier CH CI CJ CK CL CM CN     ? Changing and Maintaining Body Position:     - CURRENT STATUS: CJ - 20%-39% impaired, limited or restricted    - GOAL STATUS: CI - 1%-19% impaired, limited or restricted    - D/C STATUS:  ---------------To be determined---------------    Medical Necessity:   · Patient is expected to demonstrate progress in strength and range of motion to increase independence with ADLs and recreational activities .   Reason for Services/Other Comments:  · Patient continues to require modification of therapeutic interventions to increase complexity of exercises. TREATMENT:   (In addition to Assessment/Re-Assessment sessions the following treatments were rendered)  Pre-treatment Symptoms/Complaints: pt states that she got relief from last session next day. Less pian with rotation and housework activities. Pain: Initial:     2/10 Post Session:  0/10     [employer] Portal    THERAPEUTIC EXERCISE: ( 25 minutes):  Exercises per grid below to improve mobility and strength. Required moderate visual, verbal and manual cues to promote proper body alignment, promote proper body posture and promote proper body mechanics. Progressed resistance, range and repetitions as indicated. Date:  10/29/18 Date:  10/31/18 Date:  11/5/18 11/12/18 11/14   Activity/Exercise Parameters Parameters Parameters     Education  POC  cause of scalene strain related to stress breathing      AROM cervical all planes   NV NV X 3, 10 sec all X 3, 10 sec all    AROM thoracic all planes   NV Flex/ext x 10 with guidance  X 5 F/E    Diaphragmatic breathing   X 5 min X 10 min  X 5 min During neck traction    UBE   Level 1x 3/3  Level 3, 3/3 Level 3, 3/3    Chin tuck     NV   Chin tuck head lift      NV   B shoulder ER     NV   Rows        Extension                 Access Code: [email or web address]   URL: [email or web address]   Date: 11/05/2018   Prepared by: Arnel Duckworth      Manual Therapy Interventions: ( 30 minutes): Manual interventions performed to improve joint/soft tissue mobility and ROM to improve ability to perform ADLs. Patient responded well to all manual interventions with no significant increase in pain/symptoms. Improved pain reported below.        Technique Used Grade  Level # Time(s) Effect while being performed   CPA's  III Thoracic - cervical  3 bouts   increased movement    STM  Trigger points of B rhomboids and thoracic para spinals, B upper trap and levator scap, focusing on left scalenes    Decreased tone and pain    Cervical traction   Mid-lower cerv  Decreased stiffness                          Modalities: 0  · Ice application cerv and thoracic region post treatment    Treatment/Session Assessment:  Tolerated all above well. Cuingnneded for chin retraction during cervical AROM as well as postural alignment. · Response to Treatment:   pt with no pain upon departure. · Compliance with Program/Exercises: Will assess as treatment progresses.   · Recommendations/Intent for next treatment session: progress strengthening and postural control     Future Appointments   Date Time Provider Mita Dumont   11/19/2018  9:30 AM Kiesha Paula, PT [facility]   11/21/2018 10:15 AM Jack Merlos PT [facility]   11/26/2018 10:15 AM Kiesha Paula PT [facility]   11/28/2018 10:15 AM Kiesha Paula PT [facility]   12/14/2018 11:00 AM Lamarr Lanes, MD [facility]   4/3/2019 10:20 AM Gia Marroquin MD [employer] [facility]       Total Treatment Duration: 55 min   PT Patient Time In/Time Out  Time In: 2235  Time Out: [address] [facility], [facility]

## 2018-11-19 ENCOUNTER — HOSPITAL ENCOUNTER (OUTPATIENT)
Dept: PHYSICAL THERAPY | Age: 68
Discharge: HOME | End: 2018-11-19
Payer: MEDICARE

## 2018-11-19 PROCEDURE — 97110 THERAPEUTIC EXERCISES: CPT

## 2018-11-19 PROCEDURE — 97140 MANUAL THERAPY 1/> REGIONS: CPT

## 2018-11-19 NOTE — PROGRESS NOTES
Joselyn Haddad  : 1950  [place] / [place] / Product Type: Medicare /    Hafsa Maurizio at [address] [place] [address]. [facility], [address], [facility], [address] [place] [address]  [phone]   [phone]          OUTPATIENT PHYSICAL THERAPY:Daily Note 2018     ICD-10: Treatment Diagnosis: Pain in Left Shoulder (M25.512) Cervicalgia (M54.2)  Abnormal posture (R29.3)  thoracic pain     Precautions/Allergies:   Nickel   Fall Risk Score: 1 (? 5 = High Risk)  MD Orders: evaluate and treat MEDICAL/REFERRING DIAGNOSIS:  Chronic neck pain  DATE OF ONSET: 6 months   REFERRING PHYSICIAN: Christian Brewer MD  RETURN PHYSICIAN APPOINTMENT: not specified      INITIAL ASSESSMENT:  Ms. Ellsworth Seip presents with functional limitations due to  Neck and mid back pain. PT evaluation reveals significant intervertebral joint stiffness, muscle imbalances and abnormal postural alinement. Pt responded well after manual techniques today and will highly benefit from skilled PT to address problems below and improve quality of life. PROBLEM LIST (Impacting functional limitations):  1. Decreased Strength  2. Increased Pain  3. Decreased Flexibility/Joint Mobility  4. Decreased Knowledge of Precautions  5. Decreased [place] with Home Exercise Program INTERVENTIONS PLANNED:  1. Cold  2. Heat  3. Home Exercise Program (HEP)  4. Manual Therapy  5. Neuromuscular Re-education/Strengthening  6. Range of Motion (ROM)  7. Therapeutic Exercise/Strengthening     TREATMENT PLAN:  Effective Dates: 10/29/2018 TO 2019 (90 days). Frequency/Duration: 2 times a week for 90 Days  GOALS: (Goals have been discussed and agreed upon with patient.)  SHORT-TERM FUNCTIONAL GOALS: Time Frame: 2-4 weeks   1. Pt will be independent with HEP focusing on core stability and promoting good spinal alignment. 2. Pt will report no symptoms of UE for 1-2 weeks demonstrating centralization of symptoms.   3. Pt will report pain level does not exceed 5/10 in a 1-2 week period of time to demonstrate pain management. DISCHARGE GOALS: Time Frame: 6-8 weeks   1. Pt will improve NDI score by at least 5 points   2. Pt will demonstrate pain free cervical rotation B without report of pain to improve functional mobility. 3. Pt will be able to sustain regular exercise routine including aerobic exercise 3+ times per week without increased symptoms. Rehabilitation Potential For Stated Goals: Good              The information in this section was collected on 10/29/18 (except where otherwise noted). HISTORY:   History of Present Injury/Illness (Reason for Referral):  Chronic mid thoracic pain for years (mostly right side) most recent MRI t-spine negative for abnormalities (per pt report). MD suggested chiropractor-- receiving weekly chiro adjustments with some thoracic pain relief. Pt states that 6 months ago, left sided neck pain onset. X-rays of cervical spine at chiropractor office showing mild OA, per patient. Chiropractor suggested muscular in nature and recommended PT due to no change in symptoms. Occasionally pain also radiates to left shoulder. Pt states that, 2 weeks ago, heard pop and pain intensified since then. Present symptoms (on day of initial evaluation): constant aching and sharp pain of left sided neck occasionally radiating into arm ; aching sharp pain of central mid thoracic   · Aggravating factors: PM worse, driving, turning head to right. · Relieving factors: laying down, ice, support   · Pain level: 2/10 presently, 8/10 worst, 1-2/10 best     Past Medical History/Comorbidities:   Ms. Scot Zambraon  has a past medical history of Acne rosacea, Chronic insomnia, Chronic thoracic spine pain, Colon polyps, Coronary Calcium Score 101, Depression, major, single episode, mild ([place] [address] Dyslipidemia, GERD (gastroesophageal reflux disease), Osteoarthritis, generalized, and Sigmoid diverticulosis.   Ms. Heriberto Monet  has a past surgical history that includes hx  section (); hx colonoscopy (2016); and hx endoscopy (, ). Social History/Living Environment:     lives with  in two story home      Prior Level of Function/Work/Activity:  , driving, reaching, lifting moderately heavy boxes,     Dominant Side:         RIGHT  Other Clinical Tests:          MRI thoracic, x-rays   Previous Treatment Approaches:          Chiropractic   Current Medications:       Current Outpatient Medications:     cephALEXin (KEFLEX) 500 mg capsule, Take 1 Cap by mouth four (4) times daily. , Disp: 40 Cap, Rfl: 0    zolpidem (AMBIEN) 10 mg tablet, Take 1 Tab by mouth nightly as needed for Sleep. Max Daily Amount: 10 mg., Disp: 30 Tab, Rfl: 3    fluconazole (DIFLUCAN) 150 mg tablet, Take 1 tablet po today then repeat in four days, Disp: 2 Tab, Rfl: 0    nystatin (MYCOSTATIN) topical cream, Apply  to affected area two (2) times a day., Disp: 15 g, Rfl: 0    ESTRACE 0.01 % (0.1 mg/gram) vaginal cream, Use 1 g vaginally 2-3 nights per week  Brand only please, Disp: 1 Tube, Rfl: 3    naproxen sodium (ALEVE) 220 mg tablet, Take 220 mg by mouth two (2) times daily as needed. , Disp: , Rfl:     lamoTRIgine (LAMICTAL) 25 mg tablet, Take 25 mg by mouth two (2) times a day., Disp: , Rfl:     aspirin delayed-release 81 mg tablet, Take  by mouth daily. , Disp: , Rfl:     melatonin tab tablet, Take  by mouth nightly., Disp: , Rfl:     hydrocortisone (PROCTOSOL HC) 2.5 % rectal cream, INSERT CREAM INTO RECTUM FOUR TIMES DAILY AS DIRECTED, Disp: 1 Tube, Rfl: 5    rosuvastatin (CRESTOR) 5 mg tablet, Take 1 Tab by mouth nightly. [facility] Fax - [phone]  , Disp: 90 Tab, Rfl: 3    raNITIdine (ZANTAC) 150 mg tablet, Take 150 mg by mouth., Disp: , Rfl:     metroNIDAZOLE (METROCREAM) 0.75 % topical cream, Apply  to affected area two (2) times a day.  Use a thin layer to affected areas after washing, Disp: 45 g, Rfl: 5    multivitamin (ONE A DAY) tablet, Take 1 Tab by mouth daily. , Disp: , Rfl:    Date Last Reviewed:  11/19/2018     EXAMINATION:   Observation/Orthostatic Postural Assessment:         Rounded shoulders, forward head, increased kyphosis noted of thoracic spine        Increased tone of left upper trap, compared to right    Palpation:      Increased tone and tenderness of left upper trap         ROM:     Eval Date: 10/29/18 Re-Assess date:   Joint:     Active ROM     Cervical Extension 50% cs    Cervical Flexion 75%    Cervical Sidebending R: 50% cs  L: 75%    Cervical Rotation R: 50% cs  L: 75%    PAIVMs  C7 stiff with SP left  mobilization     Shoulder Flexion L: mild limitation   R: WNL    Shoulder Abduction L: mild limitation   R: mild limitation          Thoracic Flexion 100%    Thoracic Extension 25%        Strength:    · B UE 4+/5    Neurological Screen:  Intact     Functional Mobility:         Gait/Ambulation:  Independent and safe        Transfers:  independent and safe      Outcome Measure: Tool Used: Neck Disability Index (NDI)  Score:  Initial: 15/50  Most Recent: X/50 (Date: -- )   Interpretation of Score: The Neck Disability Index is a revised form of the Oswestry Low Back Pain Index and is designed to measure the activities of daily living in person's with neck pain. Each section is scored on a 0-5 scale, 5 representing the greatest disability. The scores of each section are added together for a total score of 50. Score 0 1-10 11-20 21-30 31-40 41-49 50   Modifier CH CI CJ CK CL CM CN     ?  Changing and Maintaining Body Position:     - CURRENT STATUS: CJ - 20%-39% impaired, limited or restricted    - GOAL STATUS: CI - 1%-19% impaired, limited or restricted    - D/C STATUS:  ---------------To be determined---------------    Medical Necessity:   · Patient is expected to demonstrate progress in strength and range of motion to increase independence with ADLs and recreational activities . Reason for Services/Other Comments:  · Patient continues to require modification of therapeutic interventions to increase complexity of exercises. TREATMENT:   (In addition to Assessment/Re-Assessment sessions the following treatments were rendered)  Pre-treatment Symptoms/Complaints: pt states that she is doing well today but had some increased pain the rest of the day after last session. Pt states that she has experienced significant relief in AM.  She continues to have some thoracic pain with prolonged upper body activies such as cooking and dish washing. Pain: Initial:     2/10 Post Session:  0/10     [employer] Portal    THERAPEUTIC EXERCISE: ( 25 minutes):  Exercises per grid below to improve mobility and strength. Required moderate visual, verbal and manual cues to promote proper body alignment, promote proper body posture and promote proper body mechanics. Progressed resistance, range and repetitions as indicated. Date:  10/29/18 Date:  10/31/18 Date:  11/5/18 11/12/18 11/14 11/19/18   Activity/Exercise Parameters Parameters Parameters      Education  POC  cause of scalene strain related to stress breathing       AROM cervical all planes   NV NV X 3, 10 sec all X 3, 10 sec all  X 3, 10 sec    AROM thoracic all planes   NV Flex/ext x 10 with guidance  X 5 F/E     Diaphragmatic breathing   X 5 min X 10 min  X 5 min During neck traction     UBE   Level 1x 3/3  Level 3, 3/3 Level 3, 3/3  nustep level 6 x 10 min    Chin tuck     NV    Chin tuck head lift      NV    B shoulder ER     NV    Rows         Extension       Red x 10, 5 sec             Access Code: [email or web address]   URL: [email or web address]   Date: 11/05/2018   Prepared by: Alicia Cruz      Manual Therapy Interventions: ( 20 minutes): Manual interventions performed to improve joint/soft tissue mobility and ROM to improve ability to perform ADLs.  Patient responded well to all manual interventions with no significant increase in pain/symptoms. Improved pain reported below. Technique Used Grade  Level # Time(s) Effect while being performed   CPA's        STM  Trigger points of B rhomboids and thoracic para spinals, B upper trap and levator scap, focusing on left scalenes    Decreased tone and pain    Cervical traction   Mid-lower cerv  Decreased stiffness                          Modalities: 10 min   · Ice application cerv and thoracic region post treatment    Treatment/Session Assessment:  Tolerated all above well. Focused on strengthening scap stabilizers today as thoracic pain most prominent with static positioning suggesting muscle fatigue. · Response to Treatment:   pt with no pain upon departure. · Compliance with Program/Exercises: Will assess as treatment progresses.   · Recommendations/Intent for next treatment session: progress strengthening and postural control     Future Appointments   Date Time Provider Mita Dumont   11/21/2018 10:15 AM [facility]   11/26/2018 10:15 AM Steph Leonardo, PT [facility]   11/28/2018 10:15 AM Steph Leonardo, PT [facility]   12/14/2018 11:00 AM Isabel Linares MD [place] [facility]   4/3/2019 10:20 AM Ry Marroquin MD [place] [facility]       Total Treatment Duration: 55 min   PT Patient Time In/Time Out  Time In: 0930  Time Out: [address], PT

## 2018-11-21 ENCOUNTER — HOSPITAL ENCOUNTER (OUTPATIENT)
Dept: PHYSICAL THERAPY | Age: 68
Discharge: HOME | End: 2018-11-21
Payer: MEDICARE

## 2018-11-21 PROCEDURE — 97110 THERAPEUTIC EXERCISES: CPT

## 2018-11-21 PROCEDURE — 97140 MANUAL THERAPY 1/> REGIONS: CPT

## 2018-11-21 NOTE — PROGRESS NOTES
Soheila Pereirar  : 1950  [place] / [place] / Product Type: Medicare /    [facility] [address] at [address]. [facility], [address], [facility], [address] [place] [address]  [phone]   [phone]          OUTPATIENT PHYSICAL THERAPY:Daily Note 2018     ICD-10: Treatment Diagnosis: Pain in Left Shoulder (M25.512) Cervicalgia (M54.2)  Abnormal posture (R29.3)  thoracic pain     Precautions/Allergies:   Nickel   Fall Risk Score: 1 (? 5 = High Risk)  MD Orders: evaluate and treat MEDICAL/REFERRING DIAGNOSIS:  Chronic neck pain  DATE OF ONSET: 6 months   REFERRING PHYSICIAN: Valentin Long MD  RETURN PHYSICIAN APPOINTMENT: not specified      INITIAL ASSESSMENT:  Ms. Deysi Harrell presents with functional limitations due to  Neck and mid back pain. PT evaluation reveals significant intervertebral joint stiffness, muscle imbalances and abnormal postural alinement. Pt responded well after manual techniques today and will highly benefit from skilled PT to address problems below and improve quality of life. PROBLEM LIST (Impacting functional limitations):  1. Decreased Strength  2. Increased Pain  3. Decreased Flexibility/Joint Mobility  4. Decreased Knowledge of Precautions  5. Decreased [place] with Home Exercise Program INTERVENTIONS PLANNED:  1. Cold  2. Heat  3. Home Exercise Program (HEP)  4. Manual Therapy  5. Neuromuscular Re-education/Strengthening  6. Range of Motion (ROM)  7. Therapeutic Exercise/Strengthening     TREATMENT PLAN:  Effective Dates: 10/29/2018 TO 2019 (90 days). Frequency/Duration: 2 times a week for 90 Days  GOALS: (Goals have been discussed and agreed upon with patient.)  SHORT-TERM FUNCTIONAL GOALS: Time Frame: 2-4 weeks   1. Pt will be independent with HEP focusing on core stability and promoting good spinal alignment. 2. Pt will report no symptoms of UE for 1-2 weeks demonstrating centralization of symptoms.   3. Pt will report pain level does not exceed 5/10 in a 1-2 week period of time to demonstrate pain management. DISCHARGE GOALS: Time Frame: 6-8 weeks   1. Pt will improve NDI score by at least 5 points   2. Pt will demonstrate pain free cervical rotation B without report of pain to improve functional mobility. 3. Pt will be able to sustain regular exercise routine including aerobic exercise 3+ times per week without increased symptoms. Rehabilitation Potential For Stated Goals: Good              The information in this section was collected on 10/29/18 (except where otherwise noted). HISTORY:   History of Present Injury/Illness (Reason for Referral):  Chronic mid thoracic pain for years (mostly right side) most recent MRI t-spine negative for abnormalities (per pt report). MD suggested chiropractor-- receiving weekly chiro adjustments with some thoracic pain relief. Pt states that 6 months ago, left sided neck pain onset. X-rays of cervical spine at chiropractor office showing mild OA, per patient. Chiropractor suggested muscular in nature and recommended PT due to no change in symptoms. Occasionally pain also radiates to left shoulder. Pt states that, 2 weeks ago, heard pop and pain intensified since then. Present symptoms (on day of initial evaluation): constant aching and sharp pain of left sided neck occasionally radiating into arm ; aching sharp pain of central mid thoracic   · Aggravating factors: PM worse, driving, turning head to right. · Relieving factors: laying down, ice, support   · Pain level: 2/10 presently, 8/10 worst, 1-2/10 best     Past Medical History/Comorbidities:   Ms. Viktor Stewart  has a past medical history of Acne rosacea, Chronic insomnia, Chronic thoracic spine pain, Colon polyps, Coronary Calcium Score 101, Depression, major, single episode, mild ([facility] [place] [address] Dyslipidemia, GERD (gastroesophageal reflux disease), Osteoarthritis, generalized, and Sigmoid diverticulosis.   Ms. Irina Acevedo  has a past surgical history that includes hx  section (); hx colonoscopy (2016); and hx endoscopy (, ). Social History/Living Environment:     lives with  in two story home      Prior Level of Function/Work/Activity:  , driving, reaching, lifting moderately heavy boxes,     Dominant Side:         RIGHT  Other Clinical Tests:          MRI thoracic, x-rays   Previous Treatment Approaches:          Chiropractic   Current Medications:       Current Outpatient Medications:     cephALEXin (KEFLEX) 500 mg capsule, Take 1 Cap by mouth four (4) times daily. , Disp: 40 Cap, Rfl: 0    zolpidem (AMBIEN) 10 mg tablet, Take 1 Tab by mouth nightly as needed for Sleep. Max Daily Amount: 10 mg., Disp: 30 Tab, Rfl: 3    fluconazole (DIFLUCAN) 150 mg tablet, Take 1 tablet po today then repeat in four days, Disp: 2 Tab, Rfl: 0    nystatin (MYCOSTATIN) topical cream, Apply  to affected area two (2) times a day., Disp: 15 g, Rfl: 0    ESTRACE 0.01 % (0.1 mg/gram) vaginal cream, Use 1 g vaginally 2-3 nights per week  Brand only please, Disp: 1 Tube, Rfl: 3    naproxen sodium (ALEVE) 220 mg tablet, Take 220 mg by mouth two (2) times daily as needed. , Disp: , Rfl:     lamoTRIgine (LAMICTAL) 25 mg tablet, Take 25 mg by mouth two (2) times a day., Disp: , Rfl:     aspirin delayed-release 81 mg tablet, Take  by mouth daily. , Disp: , Rfl:     melatonin tab tablet, Take  by mouth nightly., Disp: , Rfl:     hydrocortisone (PROCTOSOL HC) 2.5 % rectal cream, INSERT CREAM INTO RECTUM FOUR TIMES DAILY AS DIRECTED, Disp: 1 Tube, Rfl: 5    rosuvastatin (CRESTOR) 5 mg tablet, Take 1 Tab by mouth nightly. [facility] Fax - [phone]  , Disp: 90 Tab, Rfl: 3    raNITIdine (ZANTAC) 150 mg tablet, Take 150 mg by mouth., Disp: , Rfl:     metroNIDAZOLE (METROCREAM) 0.75 % topical cream, Apply  to affected area two (2) times a day.  Use a thin layer to affected areas after washing, Disp: 45 g, Rfl: 5    multivitamin (ONE A DAY) tablet, Take 1 Tab by mouth daily. , Disp: , Rfl:    Date Last Reviewed:  11/21/2018     EXAMINATION:   Observation/Orthostatic Postural Assessment:         Rounded shoulders, forward head, increased kyphosis noted of thoracic spine        Increased tone of left upper trap, compared to right    Palpation:      Increased tone and tenderness of left upper trap         ROM:     Eval Date: 10/29/18 Re-Assess date:   Joint:     Active ROM     Cervical Extension 50% cs    Cervical Flexion 75%    Cervical Sidebending R: 50% cs  L: 75%    Cervical Rotation R: 50% cs  L: 75%    PAIVMs  C7 stiff with SP left  mobilization     Shoulder Flexion L: mild limitation   R: WNL    Shoulder Abduction L: mild limitation   R: mild limitation          Thoracic Flexion 100%    Thoracic Extension 25%        Strength:    · B UE 4+/5    Neurological Screen:  Intact     Functional Mobility:         Gait/Ambulation:  Independent and safe        Transfers:  independent and safe      Outcome Measure: Tool Used: Neck Disability Index (NDI)  Score:  Initial: 15/50  Most Recent: X/50 (Date: -- )   Interpretation of Score: The Neck Disability Index is a revised form of the Oswestry Low Back Pain Index and is designed to measure the activities of daily living in person's with neck pain. Each section is scored on a 0-5 scale, 5 representing the greatest disability. The scores of each section are added together for a total score of 50. Score 0 1-10 11-20 21-30 31-40 41-49 50   Modifier CH CI CJ CK CL CM CN     ?  Changing and Maintaining Body Position:     - CURRENT STATUS: CJ - 20%-39% impaired, limited or restricted    - GOAL STATUS: CI - 1%-19% impaired, limited or restricted    - D/C STATUS:  ---------------To be determined---------------    Medical Necessity:   · Patient is expected to demonstrate progress in strength and range of motion to increase independence with ADLs and recreational activities . Reason for Services/Other Comments:  · Patient continues to require modification of therapeutic interventions to increase complexity of exercises. TREATMENT:   (In addition to Assessment/Re-Assessment sessions the following treatments were rendered)  Pre-treatment Symptoms/Complaints: pt reports she is doing pretty well and was not sore after last session. She is not sure if she is doing her new exercise for HEP correctly. Pain: Initial:     2/10 Post Session:  0/10     [employer] Portal    THERAPEUTIC EXERCISE: ( 30 minutes):  Exercises per grid below to improve mobility and strength. Required moderate visual, verbal and manual cues to promote proper body alignment, promote proper body posture and promote proper body mechanics. Progressed resistance, range and repetitions as indicated. Date:  10/29/18 Date:  10/31/18 Date:  11/5/18 11/12/18 11/14 11/19/18 11/21/18   Activity/Exercise Parameters Parameters Parameters       Education  POC  cause of scalene strain related to stress breathing        AROM cervical all planes   NV NV X 3, 10 sec all X 3, 10 sec all  X 3, 10 sec  X 5, 10 sec    AROM thoracic all planes   NV Flex/ext x 10 with guidance  X 5 F/E   X 2    Diaphragmatic breathing   X 5 min X 10 min  X 5 min During neck traction      UBE   Level 1x 3/3  Level 3, 3/3 Level 3, 3/3  nustep level 6 x 10 min  Level 3, 3/3    Chin tuck     NV  Seated with cervical side bending and rotation    Chin tuck head lift      NV     B shoulder ER     NV  NV   Rows       RTB x 10   Extension       Red x 10, 5 sec  RTB x 15             Access Code: [email or web address]   URL: [email or web address]   Date: 11/21/2018   Prepared by: Miquel Bauman     Exercises  Standing Shoulder Row with Anchored Resistance - 10 reps - 5 hold - 1x daily  Shoulder Extension with Resistance - Neutral - 10 reps - 5 hold - 1x daily  Standing Shoulder External Rotation with Resistance - 10 reps - 5 hold - 1x daily  Seated Cervical Extension AROM - 5 reps - 5 hold - 2x daily  Seated Cervical Rotation AROM - 5 reps - 3 sets - 2x daily  Seated Cervical Sidebending AROM - 5 reps - 5 hold - 1x daily  Seated Cervical Flexion AROM - 5 reps - 5 hold - 1x daily  Seated Thoracic Extension with Hands Behind Neck - 5 reps - 1x daily  Supine Cervical Retraction with Towel - 5 reps - 5 hold - 1x daily  Seated Thoracic Flexion and Extension - 5 reps - 5 hold - 1x daily     Manual Therapy Interventions: ( 20 minutes): Manual interventions performed to improve joint/soft tissue mobility and ROM to improve ability to perform ADLs. Patient responded well to all manual interventions with no significant increase in pain/symptoms. Improved pain reported below. Technique Used Grade  Level # Time(s) Effect while being performed   CPA's  Grade IV-- Mid thoracic to mid cervical region   Decreased stiffness    STM  Trigger points of B  B upper trap and levator scap, focusing on left scalenes    Decreased tone and pain    Cervical traction   Mid-lower cerv  Decreased stiffness                          Modalities: 10 min   · Ice application cerv and thoracic region post treatment    Treatment/Session Assessment: less cuing needed for above cervical exercises but required guidance with T-band exercises. No pain at end of session    · Response to Treatment:   pt with no pain upon departure. · Compliance with Program/Exercises: Will assess as treatment progresses.   · Recommendations/Intent for next treatment session: progress strengthening and postural control     Future Appointments   Date Time Provider Mita Dumont   11/26/2018 10:15 AM Gonzalez Dan, PT [facility]   11/28/2018 10:15 AM Eunice Stover PT [facility]   12/4/2018 10:45 AM Virginia Santamaria MD [facility]   12/14/2018 11:00 AM Malena Shultz MD [place] [facility] [place] [facility] [place] [address] [facility] [address] [facility] [address]   4/3/2019 10:20 AM Sarah Lira MD [place] [facility]       Total Treatment Duration: 55 min PT Patient Time In/Time Out  Time In: 1015  Time Out: [id number] [facility], PT

## 2018-11-26 ENCOUNTER — HOSPITAL ENCOUNTER (OUTPATIENT)
Dept: PHYSICAL THERAPY | Age: 68
Discharge: HOME | End: 2018-11-26
Payer: MEDICARE

## 2018-11-26 PROCEDURE — 97110 THERAPEUTIC EXERCISES: CPT

## 2018-11-26 PROCEDURE — 97140 MANUAL THERAPY 1/> REGIONS: CPT

## 2018-11-26 NOTE — PROGRESS NOTES
Emily Zarate  : 1950  [place] / [place] / Product Type: Medicare /    Jason Clancy at [address] [place] [address]. [facility], [address], [facility], [address] [place] [address]  [phone]   [phone]          OUTPATIENT PHYSICAL THERAPY:Daily Note 2018     ICD-10: Treatment Diagnosis: Pain in Left Shoulder (M25.512) Cervicalgia (M54.2)  Abnormal posture (R29.3)  thoracic pain     Precautions/Allergies:   Nickel   Fall Risk Score: 1 (? 5 = High Risk)  MD Orders: evaluate and treat MEDICAL/REFERRING DIAGNOSIS:  Chronic neck pain  DATE OF ONSET: 6 months   REFERRING PHYSICIAN: Concepcion Sandoval MD  RETURN PHYSICIAN APPOINTMENT: not specified      INITIAL ASSESSMENT:  Ms. Kemi Kelsey presents with functional limitations due to  Neck and mid back pain. PT evaluation reveals significant intervertebral joint stiffness, muscle imbalances and abnormal postural alinement. Pt responded well after manual techniques today and will highly benefit from skilled PT to address problems below and improve quality of life. PROBLEM LIST (Impacting functional limitations):  1. Decreased Strength  2. Increased Pain  3. Decreased Flexibility/Joint Mobility  4. Decreased Knowledge of Precautions  5. Decreased [place] with Home Exercise Program INTERVENTIONS PLANNED:  1. Cold  2. Heat  3. Home Exercise Program (HEP)  4. Manual Therapy  5. Neuromuscular Re-education/Strengthening  6. Range of Motion (ROM)  7. Therapeutic Exercise/Strengthening     TREATMENT PLAN:  Effective Dates: 10/29/2018 TO 2019 (90 days). Frequency/Duration: 2 times a week for 90 Days  GOALS: (Goals have been discussed and agreed upon with patient.)  SHORT-TERM FUNCTIONAL GOALS: Time Frame: 2-4 weeks   1. Pt will be independent with HEP focusing on core stability and promoting good spinal alignment. 2. Pt will report no symptoms of UE for 1-2 weeks demonstrating centralization of symptoms.   3. Pt will report pain level does not exceed 5/10 in a 1-2 week period of time to demonstrate pain management. DISCHARGE GOALS: Time Frame: 6-8 weeks   1. Pt will improve NDI score by at least 5 points   2. Pt will demonstrate pain free cervical rotation B without report of pain to improve functional mobility. 3. Pt will be able to sustain regular exercise routine including aerobic exercise 3+ times per week without increased symptoms. Rehabilitation Potential For Stated Goals: Good              The information in this section was collected on 10/29/18 (except where otherwise noted). HISTORY:   History of Present Injury/Illness (Reason for Referral):  Chronic mid thoracic pain for years (mostly right side) most recent MRI t-spine negative for abnormalities (per pt report). MD suggested chiropractor-- receiving weekly chiro adjustments with some thoracic pain relief. Pt states that 6 months ago, left sided neck pain onset. X-rays of cervical spine at chiropractor office showing mild OA, per patient. Chiropractor suggested muscular in nature and recommended PT due to no change in symptoms. Occasionally pain also radiates to left shoulder. Pt states that, 2 weeks ago, heard pop and pain intensified since then. Present symptoms (on day of initial evaluation): constant aching and sharp pain of left sided neck occasionally radiating into arm ; aching sharp pain of central mid thoracic   · Aggravating factors: PM worse, driving, turning head to right. · Relieving factors: laying down, ice, support   · Pain level: 2/10 presently, 8/10 worst, 1-2/10 best     Past Medical History/Comorbidities:   Ms. Christa Armijo  has a past medical history of Acne rosacea, Chronic insomnia, Chronic thoracic spine pain, Colon polyps, Coronary Calcium Score 101, Depression, major, single episode, mild ([address] Dyslipidemia, GERD (gastroesophageal reflux disease), Osteoarthritis, generalized, and Sigmoid diverticulosis.   Ms. Kemi Kelsey  has a past surgical history that includes hx  section (); hx colonoscopy (2016); and hx endoscopy (, ). Social History/Living Environment:     lives with  in two story home      Prior Level of Function/Work/Activity:  , driving, reaching, lifting moderately heavy boxes,     Dominant Side:         RIGHT  Other Clinical Tests:          MRI thoracic, x-rays   Previous Treatment Approaches:          Chiropractic   Current Medications:       Current Outpatient Medications:     cephALEXin (KEFLEX) 500 mg capsule, Take 1 Cap by mouth four (4) times daily. , Disp: 40 Cap, Rfl: 0    zolpidem (AMBIEN) 10 mg tablet, Take 1 Tab by mouth nightly as needed for Sleep. Max Daily Amount: 10 mg., Disp: 30 Tab, Rfl: 3    fluconazole (DIFLUCAN) 150 mg tablet, Take 1 tablet po today then repeat in four days, Disp: 2 Tab, Rfl: 0    nystatin (MYCOSTATIN) topical cream, Apply  to affected area two (2) times a day., Disp: 15 g, Rfl: 0    ESTRACE 0.01 % (0.1 mg/gram) vaginal cream, Use 1 g vaginally 2-3 nights per week  Brand only please, Disp: 1 Tube, Rfl: 3    naproxen sodium (ALEVE) 220 mg tablet, Take 220 mg by mouth two (2) times daily as needed. , Disp: , Rfl:     lamoTRIgine (LAMICTAL) 25 mg tablet, Take 25 mg by mouth two (2) times a day., Disp: , Rfl:     aspirin delayed-release 81 mg tablet, Take  by mouth daily. , Disp: , Rfl:     melatonin tab tablet, Take  by mouth nightly., Disp: , Rfl:     hydrocortisone (PROCTOSOL HC) 2.5 % rectal cream, INSERT CREAM INTO RECTUM FOUR TIMES DAILY AS DIRECTED, Disp: 1 Tube, Rfl: 5    rosuvastatin (CRESTOR) 5 mg tablet, Take 1 Tab by mouth nightly. [facility] Fax - [phone]  , Disp: 90 Tab, Rfl: 3    raNITIdine (ZANTAC) 150 mg tablet, Take 150 mg by mouth., Disp: , Rfl:     metroNIDAZOLE (METROCREAM) 0.75 % topical cream, Apply  to affected area two (2) times a day.  Use a thin layer to affected areas after washing, Disp: 45 g, Rfl: 5    multivitamin (ONE A DAY) tablet, Take 1 Tab by mouth daily. , Disp: , Rfl:    Date Last Reviewed:  11/26/2018     EXAMINATION:   Observation/Orthostatic Postural Assessment:         Rounded shoulders, forward head, increased kyphosis noted of thoracic spine        Increased tone of left upper trap, compared to right    Palpation:      Increased tone and tenderness of left upper trap         ROM:     Eval Date: 10/29/18 Re-Assess date:   Joint:     Active ROM     Cervical Extension 50% cs    Cervical Flexion 75%    Cervical Sidebending R: 50% cs  L: 75%    Cervical Rotation R: 50% cs  L: 75%    PAIVMs  C7 stiff with SP left  mobilization     Shoulder Flexion L: mild limitation   R: WNL    Shoulder Abduction L: mild limitation   R: mild limitation          Thoracic Flexion 100%    Thoracic Extension 25%        Strength:    · B UE 4+/5    Neurological Screen:  Intact     Functional Mobility:         Gait/Ambulation:  Independent and safe        Transfers:  independent and safe      Outcome Measure: Tool Used: Neck Disability Index (NDI)  Score:  Initial: 15/50  Most Recent: X/50 (Date: -- )   Interpretation of Score: The Neck Disability Index is a revised form of the Oswestry Low Back Pain Index and is designed to measure the activities of daily living in person's with neck pain. Each section is scored on a 0-5 scale, 5 representing the greatest disability. The scores of each section are added together for a total score of 50. Score 0 1-10 11-20 21-30 31-40 41-49 50   Modifier CH CI CJ CK CL CM CN     ?  Changing and Maintaining Body Position:     - CURRENT STATUS: CJ - 20%-39% impaired, limited or restricted    - GOAL STATUS: CI - 1%-19% impaired, limited or restricted    - D/C STATUS:  ---------------To be determined---------------    Medical Necessity:   · Patient is expected to demonstrate progress in strength and range of motion to increase independence with ADLs and recreational activities . Reason for Services/Other Comments:  · Patient continues to require modification of therapeutic interventions to increase complexity of exercises. TREATMENT:   (In addition to Assessment/Re-Assessment sessions the following treatments were rendered)  Pre-treatment Symptoms/Complaints: pt reports she did alright after last session however, was cooking all weekend so had increased pain of mid thoracic and left upper trap and needed pain medication to sleep. Pain: Initial:     2/10 Post Session:  0/10     [employer] Portal    THERAPEUTIC EXERCISE: ( 20 minutes):  Exercises per grid below to improve mobility and strength. Required moderate visual, verbal and manual cues to promote proper body alignment, promote proper body posture and promote proper body mechanics. Progressed resistance, range and repetitions as indicated. Date:  10/29/18 Date:  10/31/18 Date:  11/5/18 11/12/18 11/14 11/19/18 11/27/18   Activity/Exercise Parameters Parameters Parameters       Education  POC  cause of scalene strain related to stress breathing        AROM cervical all planes   NV NV X 3, 10 sec all X 3, 10 sec all  X 3, 10 sec     AROM thoracic all planes   NV Flex/ext x 10 with guidance  X 5 F/E      Diaphragmatic breathing   X 5 min X 10 min  X 5 min During neck traction   During session    UBE   Level 1x 3/3  Level 3, 3/3 Level 3, 3/3  nustep level 6 x 10 min  3/3 level 3    Chin tuck     NV     Chin tuck head lift      NV     B shoulder ER     NV  Yellow band supine x 10     Rows          Extension       Red x 10, 5 sec     Supine B shoulder flexion        With breathing x 10    Access Code: [email or web address]   URL: [email or web address]   Date: 11/05/2018   Prepared by: Jeana Howard      Manual Therapy Interventions: ( 25 minutes): Manual interventions performed to improve joint/soft tissue mobility and ROM to improve ability to perform ADLs.  Patient responded well to all manual interventions with no significant increase in pain/symptoms. Improved pain reported below. Technique Used Grade  Level # Time(s) Effect while being performed   CPA's        STM  Trigger points of B rhomboids and thoracic para spinals, B upper trap and levator scap, focusing on left scalenes    Decreased tone and pain    Cervical traction   Mid-lower cerv  Decreased stiffness    DTM  Left upper trap and left scalenes   Decreased tone, increased movement    Manual muscle lengthening  Static stretch B Upper trap and left levator  3 x 10 sec  Increased ROM cervical            Modalities: 10 min   · Ice application cerv and thoracic region post treatment    Treatment/Session Assessment:  Pt with increased hypertonicity of left upper trap today than has displayed last few sessions. No complaints at end of session and decreased tone noted. · Response to Treatment:   pt with no pain upon departure. · Compliance with Program/Exercises: Will assess as treatment progresses.   · Recommendations/Intent for next treatment session: progress strengthening and postural control     Future Appointments   Date Time Provider Mita Dumont   11/28/2018 10:15 AM Kiesha Paula, DAIS[place] [facility]   12/4/2018 10:45 AM Gia Cespedes MD [facility]   12/14/2018 11:00 AM Lamarr Lanes, MD [place] [facility]   4/3/2019 10:20 AM Gia Marroquin MD [place] [facility]       Total Treatment Duration: 55 min   PT Patient Time In/Time Out  Time In: 1015  Time Out: [id number] [facility],

## 2018-11-28 ENCOUNTER — HOSPITAL ENCOUNTER (OUTPATIENT)
Dept: PHYSICAL THERAPY | Age: 68
Discharge: HOME | End: 2018-11-28
Payer: MEDICARE

## 2018-11-28 PROCEDURE — 97110 THERAPEUTIC EXERCISES: CPT

## 2018-11-28 PROCEDURE — 97140 MANUAL THERAPY 1/> REGIONS: CPT

## 2018-12-03 ENCOUNTER — HOSPITAL ENCOUNTER (OUTPATIENT)
Dept: PHYSICAL THERAPY | Age: 68
Discharge: HOME | End: 2018-12-03
Payer: MEDICARE

## 2018-12-03 PROCEDURE — 97110 THERAPEUTIC EXERCISES: CPT

## 2018-12-03 PROCEDURE — 97140 MANUAL THERAPY 1/> REGIONS: CPT

## 2018-12-03 NOTE — PROGRESS NOTES
Migdalia Tavares  : 1950  [place] / [place] / Product Type: Medicare /    [facility] at [address] [facility]. [address], [facility], [address] [place] [address]  [phone]   [phone]          OUTPATIENT PHYSICAL THERAPY:Daily Note 2018     ICD-10: Treatment Diagnosis: Pain in Left Shoulder (M25.512) Cervicalgia (M54.2)  Abnormal posture (R29.3)  thoracic pain     Precautions/Allergies:   Nickel   Fall Risk Score: 1 (? 5 = High Risk)  MD Orders: evaluate and treat MEDICAL/REFERRING DIAGNOSIS:  Chronic neck pain  DATE OF ONSET: 6 months   REFERRING PHYSICIAN: Janet Buck MD  RETURN PHYSICIAN APPOINTMENT: not specified      INITIAL ASSESSMENT:  Ms. Maki Hernandez presents with functional limitations due to  Neck and mid back pain. PT evaluation reveals significant intervertebral joint stiffness, muscle imbalances and abnormal postural alinement. Pt responded well after manual techniques today and will highly benefit from skilled PT to address problems below and improve quality of life. PROBLEM LIST (Impacting functional limitations):  1. Decreased Strength  2. Increased Pain  3. Decreased Flexibility/Joint Mobility  4. Decreased Knowledge of Precautions  5. Decreased [place] with Home Exercise Program INTERVENTIONS PLANNED:  1. Cold  2. Heat  3. Home Exercise Program (HEP)  4. Manual Therapy  5. Neuromuscular Re-education/Strengthening  6. Range of Motion (ROM)  7. Therapeutic Exercise/Strengthening     TREATMENT PLAN:  Effective Dates: 10/29/2018 TO 2019 (90 days). Frequency/Duration: 2 times a week for 90 Days  GOALS: (Goals have been discussed and agreed upon with patient.)  SHORT-TERM FUNCTIONAL GOALS: Time Frame: 2-4 weeks   1. Pt will be independent with HEP focusing on core stability and promoting good spinal alignment. 2. Pt will report no symptoms of UE for 1-2 weeks demonstrating centralization of symptoms.   3. Pt will report pain level does not exceed 5/10 in a 1-2 week period of time to demonstrate pain management. DISCHARGE GOALS: Time Frame: 6-8 weeks   1. Pt will improve NDI score by at least 5 points   2. Pt will demonstrate pain free cervical rotation B without report of pain to improve functional mobility. 3. Pt will be able to sustain regular exercise routine including aerobic exercise 3+ times per week without increased symptoms. Rehabilitation Potential For Stated Goals: Good              The information in this section was collected on 10/29/18 (except where otherwise noted). HISTORY:   History of Present Injury/Illness (Reason for Referral):  Chronic mid thoracic pain for years (mostly right side) most recent MRI t-spine negative for abnormalities (per pt report). MD suggested chiropractor-- receiving weekly chiro adjustments with some thoracic pain relief. Pt states that 6 months ago, left sided neck pain onset. X-rays of cervical spine at chiropractor office showing mild OA, per patient. Chiropractor suggested muscular in nature and recommended PT due to no change in symptoms. Occasionally pain also radiates to left shoulder. Pt states that, 2 weeks ago, heard pop and pain intensified since then. Present symptoms (on day of initial evaluation): constant aching and sharp pain of left sided neck occasionally radiating into arm ; aching sharp pain of central mid thoracic   · Aggravating factors: PM worse, driving, turning head to right. · Relieving factors: laying down, ice, support   · Pain level: 2/10 presently, 8/10 worst, 1-2/10 best     Past Medical History/Comorbidities:   Ms. Giacomo Mendoza  has a past medical history of Acne rosacea, Chronic insomnia, Chronic thoracic spine pain, Colon polyps, Coronary Calcium Score 101, Depression, major, single episode, mild ([facility] [address] Dyslipidemia, GERD (gastroesophageal reflux disease), Osteoarthritis, generalized, and Sigmoid diverticulosis.   Ms. Giacomo Mendoza has a past surgical history that includes hx  section (); hx colonoscopy (2016); and hx endoscopy (, ). Social History/Living Environment:     lives with  in two story home      Prior Level of Function/Work/Activity:  , driving, reaching, lifting moderately heavy boxes,     Dominant Side:         RIGHT  Other Clinical Tests:          MRI thoracic, x-rays   Previous Treatment Approaches:          Chiropractic   Current Medications:       Current Outpatient Medications:     traMADol (ULTRAM) 50 mg tablet, Take 50 mg by mouth every six (6) hours as needed for Pain., Disp: , Rfl:     nitrofurantoin (MACRODANTIN) 100 mg capsule, Take 1 Cap by mouth nightly., Disp: 30 Cap, Rfl: 2    cephALEXin (KEFLEX) 500 mg capsule, Take 1 Cap by mouth four (4) times daily. , Disp: 40 Cap, Rfl: 0    zolpidem (AMBIEN) 10 mg tablet, Take 1 Tab by mouth nightly as needed for Sleep. Max Daily Amount: 10 mg., Disp: 30 Tab, Rfl: 3    fluconazole (DIFLUCAN) 150 mg tablet, Take 1 tablet po today then repeat in four days, Disp: 2 Tab, Rfl: 0    nystatin (MYCOSTATIN) topical cream, Apply  to affected area two (2) times a day., Disp: 15 g, Rfl: 0    ESTRACE 0.01 % (0.1 mg/gram) vaginal cream, Use 1 g vaginally 2-3 nights per week  Brand only please, Disp: 1 Tube, Rfl: 3    lamoTRIgine (LAMICTAL) 25 mg tablet, Take 25 mg by mouth two (2) times a day., Disp: , Rfl:     aspirin delayed-release 81 mg tablet, Take  by mouth daily. , Disp: , Rfl:     melatonin tab tablet, Take  by mouth nightly., Disp: , Rfl:     hydrocortisone (PROCTOSOL HC) 2.5 % rectal cream, INSERT CREAM INTO RECTUM FOUR TIMES DAILY AS DIRECTED, Disp: 1 Tube, Rfl: 5    rosuvastatin (CRESTOR) 5 mg tablet, Take 1 Tab by mouth nightly.  [facility] Fax - [phone]  , Disp: 90 Tab, Rfl: 3    raNITIdine (ZANTAC) 150 mg tablet, Take 150 mg by mouth., Disp: , Rfl:     metroNIDAZOLE (METROCREAM) 0.75 % topical cream, Apply  to affected area two (2) times a day. Use a thin layer to affected areas after washing, Disp: 45 g, Rfl: 5    multivitamin (ONE A DAY) tablet, Take 1 Tab by mouth daily. , Disp: , Rfl:    Date Last Reviewed:  12/3/2018     EXAMINATION:   Observation/Orthostatic Postural Assessment:         Rounded shoulders, forward head, increased kyphosis noted of thoracic spine        Increased tone of left upper trap, compared to right    Palpation:      Increased tone and tenderness of left upper trap         ROM:     Eval Date: 10/29/18 Re-Assess date:   Joint:     Active ROM     Cervical Extension 50% cs    Cervical Flexion 75%    Cervical Sidebending R: 50% cs  L: 75%    Cervical Rotation R: 50% cs  L: 75%    PAIVMs  C7 stiff with SP left  mobilization     Shoulder Flexion L: mild limitation   R: WNL    Shoulder Abduction L: mild limitation   R: mild limitation          Thoracic Flexion 100%    Thoracic Extension 25%        Strength:    · B UE 4+/5    Neurological Screen:  Intact     Functional Mobility:         Gait/Ambulation:  Independent and safe        Transfers:  independent and safe      Outcome Measure: Tool Used: Neck Disability Index (NDI)  Score:  Initial: 15/50  Most Recent: X/50 (Date: -- )   Interpretation of Score: The Neck Disability Index is a revised form of the Oswestry Low Back Pain Index and is designed to measure the activities of daily living in person's with neck pain. Each section is scored on a 0-5 scale, 5 representing the greatest disability. The scores of each section are added together for a total score of 50. Score 0 1-10 11-20 21-30 31-40 41-49 50   Modifier CH CI CJ CK CL CM CN     ?  Changing and Maintaining Body Position:    [id number] - CURRENT STATUS: CJ - 20%-39% impaired, limited or restricted    - GOAL STATUS: CI - 1%-19% impaired, limited or restricted    - D/C STATUS:  ---------------To be determined---------------    Medical Necessity:   · Patient is expected to demonstrate progress in strength and range of motion to increase independence with ADLs and recreational activities . Reason for Services/Other Comments:  · Patient continues to require modification of therapeutic interventions to increase complexity of exercises. TREATMENT:   (In addition to Assessment/Re-Assessment sessions the following treatments were rendered)  Pre-treatment Symptoms/Complaints: pt states that she did well after last session but thoracic pain returned with prolonged cooking the next day. Pain: Initial:     2/10 Post Session:  0/10     [employer] Portal    THERAPEUTIC EXERCISE: (15 minutes):  Exercises per grid below to improve mobility and strength. Required moderate visual, verbal and manual cues to promote proper body alignment, promote proper body posture and promote proper body mechanics. Progressed resistance, range and repetitions as indicated. Date:  10/29/18 Date:  10/31/18 Date:  11/5/18 11/12/18 11/14 11/19/18 11/26/18 11/28 12/3/18   Activity/Exercise Parameters Parameters Parameters         Education  POC  cause of scalene strain related to stress breathing      Discussed ginger of t-band anchor for HEP    AROM cervical all planes   NV NV X 3, 10 sec all X 3, 10 sec all  X 3, 10 sec       AROM thoracic all planes   NV Flex/ext x 10 with guidance  X 5 F/E        Diaphragmatic breathing   X 5 min X 10 min  X 5 min During neck traction   During session   During manual    UBE   Level 1x 3/3  Level 3, 3/3 Level 3, 3/3  nustep level 6 x 10 min  3/3 level 3  3/3 level 3  3/3 level 3   Chin tuck     NV    X 10, 5 sec    Chin tuck head lift      NV       B shoulder ER     NV  Yellow band supine x 10    Standing x 10    Rows         X 10 yTB   Extension       Red x 10, 5 sec    X 10-YTB   Supine B shoulder flexion        With breathing x 10      Access Code: [email or web address]   URL: [email or web address]   Date: 11/05/2018   Prepared by: Isadora Harris      Manual Therapy Interventions: ( 35 minutes): Manual interventions performed to improve joint/soft tissue mobility and ROM to improve ability to perform ADLs. Patient responded well to all manual interventions with no significant increase in pain/symptoms. Improved pain reported below. Technique Used Grade  Level # Time(s) Effect while being performed   CPA's   lower cervical and all thoracic      STM  Trigger points of B rhomboids, B upper trap and  left scalenes    Decreased tone and pain    Cervical traction   Mid-lower cerv  Decreased stiffness    DTM  Left upper trap and left scalenes   Decreased tone, increased movement    Manual muscle lengthening  Static stretch B Upper trap and left levator  3 x 10 sec  Increased ROM cervical     chin tuck     Supine 5,x 5 sec    Modalities: 10 min   · Ice application cerv and thoracic region post treatment    Treatment/Session Assessment: went over all HEP today to determine if correct body mechanics are being used. Mild cuing needed for chin tuck and t-band exercises. Manual focused on right rhomboids and B upper traps. · Response to Treatment:   Pt states no pain at end of session. · Compliance with Program/Exercises: Will assess as treatment progresses.   · Recommendations/Intent for next treatment session:     Future Appointments   Date Time Provider Mita Dumont   12/10/2018 11:15 AM Julia Gong, PT [facility]   12/13/2018 10:15 AM [facility]   12/14/2018 11:00 AM Janet Buck MD [place] [facility]   1/4/2019 10:50 AM SFE DEXA BI GE LUNAR DEXA [facility]   4/3/2019 10:20 AM Lindsay Marroquin MD [facility]       Total Treatment Duration: 60 min   PT Patient Time In/Time Out  Time In: 1300  Time Out: Josee [address], PT

## 2018-12-06 ENCOUNTER — HOSPITAL ENCOUNTER (OUTPATIENT)
Dept: PHYSICAL THERAPY | Age: 68
Discharge: HOME | End: 2018-12-06
Payer: MEDICARE

## 2018-12-06 PROCEDURE — G8982 BODY POS GOAL STATUS: HCPCS

## 2018-12-06 PROCEDURE — 97110 THERAPEUTIC EXERCISES: CPT

## 2018-12-06 PROCEDURE — G8981 BODY POS CURRENT STATUS: HCPCS

## 2018-12-06 PROCEDURE — 97140 MANUAL THERAPY 1/> REGIONS: CPT

## 2018-12-06 NOTE — PROGRESS NOTES
Ruben Perea  : 1950  [place] / [place] / Product Type: Medicare /    [employer] [address] at [address] [facility], [address], [facility], [address] [place] [address]  [phone]   [phone]          OUTPATIENT PHYSICAL THERAPY:Daily Note and Progress Report 2018     ICD-10: Treatment Diagnosis: Pain in Left Shoulder (M25.512) Cervicalgia (M54.2)  Abnormal posture (R29.3)  thoracic pain     Precautions/Allergies:   Nickel   Fall Risk Score: 1 (? 5 = High Risk)  MD Orders: evaluate and treat MEDICAL/REFERRING DIAGNOSIS:  Chronic neck pain  DATE OF ONSET: 6 months   REFERRING PHYSICIAN: Anu Jauregui MD  RETURN PHYSICIAN APPOINTMENT: not specified      ASSESSMENT:  Ms. Sole Gómez has attended 12 visits for physical therapy treatment of thoracic and cervical pain. Overall, patient has made improvements with ROM, postural awareness and muscle tone however, she has only reported temporary relief with thoracic pain reduction and has actually regressed with subjective functional outcome measures. Symptoms have presented as muscle strain mostly of rhomboid and mid/lower trapezius and increase with prolonged functional tasks such as sitting or standing using UE's (eg washing dishes) but despite strengthening and manual techniques to reduce muscle strain, symptoms persist. Pt has 2 more scheduled visits here and I have encouraged her to f/u with referring MD to discuss further interventions. PROBLEM LIST (Impacting functional limitations):  1. Decreased Strength  2. Increased Pain  3. Decreased Flexibility/Joint Mobility  4. Decreased Knowledge of Precautions  5. Decreased [place] with Home Exercise Program INTERVENTIONS PLANNED:  1. Cold  2. Heat  3. Home Exercise Program (HEP)  4. Manual Therapy  5. Neuromuscular Re-education/Strengthening  6. Range of Motion (ROM)  7.  Therapeutic Exercise/Strengthening     TREATMENT PLAN:  Effective Dates: 10/29/2018 TO 1/27/2019 (90 days). Frequency/Duration: 2 times a week for 90 Days  GOALS: (Goals have been discussed and agreed upon with patient.)  SHORT-TERM FUNCTIONAL GOALS: Time Frame: 2-4 weeks   1. Pt will be independent with HEP focusing on core stability and promoting good spinal alignment. (met)  2. Pt will report no symptoms of UE for 1-2 weeks demonstrating centralization of symptoms. (met)  3. Pt will report pain level does not exceed 5/10 in a 1-2 week period of time to demonstrate pain management. (not met)  DISCHARGE GOALS: Time Frame: 6-8 weeks   1. Pt will improve NDI score by at least 5 points   2. Pt will demonstrate pain free cervical rotation B without report of pain to improve functional mobility. 3. Pt will be able to sustain regular exercise routine including aerobic exercise 3+ times per week without increased symptoms. (not met)   Rehabilitation Potential For Stated Goals: Good              The information in this section was collected on 10/29/18 (except where otherwise noted). HISTORY:   History of Present Injury/Illness (Reason for Referral):  Chronic mid thoracic pain for years (mostly right side) most recent MRI t-spine negative for abnormalities (per pt report). MD suggested chiropractor-- receiving weekly chiro adjustments with some thoracic pain relief. Pt states that 6 months ago, left sided neck pain onset. X-rays of cervical spine at chiropractor office showing mild OA, per patient. Chiropractor suggested muscular in nature and recommended PT due to no change in symptoms. Occasionally pain also radiates to left shoulder. Pt states that, 2 weeks ago, heard pop and pain intensified since then. Present symptoms (on day of initial evaluation): constant aching and sharp pain of left sided neck occasionally radiating into arm ; aching sharp pain of central mid thoracic   · Aggravating factors: PM worse, driving, turning head to right.    · Relieving factors: laying down, ice, support   · Pain level: 2/10 presently, 8/10 worst, 1-2/10 best     Past Medical History/Comorbidities:   Ms. Jim Mccllelan  has a past medical history of Acne rosacea, Chronic insomnia, Chronic thoracic spine pain, Colon polyps, Coronary Calcium Score 101, Depression, major, single episode, mild ([address] Dyslipidemia, GERD (gastroesophageal reflux disease), Osteoarthritis, generalized, and Sigmoid diverticulosis. Ms. Jim Mcclellan  has a past surgical history that includes hx  section (); hx colonoscopy (2016); and hx endoscopy (, ). Social History/Living Environment:     lives with  in two story home      Prior Level of Function/Work/Activity:  , driving, reaching, lifting moderately heavy boxes,     Dominant Side:         RIGHT  Other Clinical Tests:          MRI thoracic, x-rays   Previous Treatment Approaches:          Chiropractic   Current Medications:       Current Outpatient Medications:     traMADol (ULTRAM) 50 mg tablet, Take 50 mg by mouth every six (6) hours as needed for Pain., Disp: , Rfl:     nitrofurantoin (MACRODANTIN) 100 mg capsule, Take 1 Cap by mouth nightly., Disp: 30 Cap, Rfl: 2    cephALEXin (KEFLEX) 500 mg capsule, Take 1 Cap by mouth four (4) times daily. , Disp: 40 Cap, Rfl: 0    zolpidem (AMBIEN) 10 mg tablet, Take 1 Tab by mouth nightly as needed for Sleep. Max Daily Amount: 10 mg., Disp: 30 Tab, Rfl: 3    fluconazole (DIFLUCAN) 150 mg tablet, Take 1 tablet po today then repeat in four days, Disp: 2 Tab, Rfl: 0    nystatin (MYCOSTATIN) topical cream, Apply  to affected area two (2) times a day., Disp: 15 g, Rfl: 0    ESTRACE 0.01 % (0.1 mg/gram) vaginal cream, Use 1 g vaginally 2-3 nights per week  Brand only please, Disp: 1 Tube, Rfl: 3    lamoTRIgine (LAMICTAL) 25 mg tablet, Take 25 mg by mouth two (2) times a day., Disp: , Rfl:     aspirin delayed-release 81 mg tablet, Take  by mouth daily. , Disp: , Rfl:     melatonin tab tablet, Take by mouth nightly., Disp: , Rfl:     hydrocortisone (PROCTOSOL HC) 2.5 % rectal cream, INSERT CREAM INTO RECTUM FOUR TIMES DAILY AS DIRECTED, Disp: 1 Tube, Rfl: 5    rosuvastatin (CRESTOR) 5 mg tablet, Take 1 Tab by mouth nightly. [facility] Fax - [phone]  , Disp: 90 Tab, Rfl: 3    raNITIdine (ZANTAC) 150 mg tablet, Take 150 mg by mouth., Disp: , Rfl:     metroNIDAZOLE (METROCREAM) 0.75 % topical cream, Apply  to affected area two (2) times a day. Use a thin layer to affected areas after washing, Disp: 45 g, Rfl: 5    multivitamin (ONE A DAY) tablet, Take 1 Tab by mouth daily. , Disp: , Rfl:    Date Last Reviewed:  12/6/2018     EXAMINATION:   Observation/Orthostatic Postural Assessment:         Rounded shoulders, forward head, increased kyphosis noted of thoracic spine        Increased tone of left upper trap, compared to right    Palpation:      Increased tone and tenderness of left upper trap and right rhomboids          ROM:     Eval Date: 10/29/18 Re-Assess date: 12/6/18   Joint:     Active ROM     Cervical Extension 50% cs 75%   Cervical Flexion 75% 75%   Cervical Sidebending R: 50% cs  L: 75% R: 75%  L: 75%   Cervical Rotation R: 50% cs  L: 75% R: 75%  L: 75%   PAIVMs  C7 stiff with SP left  mobilization     Shoulder Flexion L: mild limitation   R: WNL    Shoulder Abduction L: mild limitation   R: mild limitation          Thoracic Flexion 100% 100%   Thoracic Extension 25% 50%       Strength:    · B UE 4+/5    Neurological Screen:  Intact     Functional Mobility:         Gait/Ambulation:  Independent and safe        Transfers:  independent and safe      Outcome Measure: Tool Used: Neck Disability Index (NDI)  Score:  Initial: 15/50  Most Recent: 22/50 (Date: 12/6/18)   Interpretation of Score: The Neck Disability Index is a revised form of the Oswestry Low Back Pain Index and is designed to measure the activities of daily living in person's with neck pain.   Each section is scored on a 0-5 scale, 5 representing the greatest disability. The scores of each section are added together for a total score of 50. Score 0 1-10 11-20 21-30 31-40 41-49 50   Modifier CH CI CJ CK CL CM CN     ? Changing and Maintaining Body Position:    [id number] - CURRENT STATUS: CK - 40%-59% impaired, limited or restricted    - GOAL STATUS: CI - 1%-19% impaired, limited or restricted    - D/C STATUS:  ---------------To be determined---------------    Medical Necessity:   · Patient is expected to demonstrate progress in strength and range of motion to increase independence with ADLs and recreational activities . Reason for Services/Other Comments:  · Patient continues to require modification of therapeutic interventions to increase complexity of exercises. TREATMENT:   (In addition to Assessment/Re-Assessment sessions the following treatments were rendered)  Pre-treatment Symptoms/Complaints: pt reports thoracic pain returned with in a few hours after last treatment. Pain: Initial:     2/10 Post Session:  0/10     [employer] Portal    THERAPEUTIC EXERCISE: (10 minutes):  Exercises per grid below to improve mobility and strength. Required moderate visual, verbal and manual cues to promote proper body alignment, promote proper body posture and promote proper body mechanics. Progressed resistance, range and repetitions as indicated.    Date:  10/31/18 Date:  11/5/18 11/12/18 11/14 11/19/18 11/26/18 11/28 12/3/18 12/6   Activity/Exercise Parameters Parameters          Education   cause of scalene strain related to stress breathing      Discussed ginger of t-band anchor for HEP     AROM cervical all planes  NV NV X 3, 10 sec all X 3, 10 sec all  X 3, 10 sec     X 3    AROM thoracic all planes  NV Flex/ext x 10 with guidance  X 5 F/E         Diaphragmatic breathing  X 5 min X 10 min  X 5 min During neck traction   During session   During manual  during treatment    UBE  Level 1x 3/3  Level 3, 3/3 Level 3, 3/3  nustep level 6 x 10 min  3/3 level 3  3/3 level 3  3/3 level 3 3/3 level 3    Chin tuck    NV    X 10, 5 sec     Chin tuck head lift     NV        B shoulder ER    NV  Yellow band supine x 10    Standing x 10     Rows        X 10 yTB    Extension      Red x 10, 5 sec    X 10-YTB    Supine B shoulder flexion       With breathing x 10       Access Code: [email or web address]   URL: [email or web address]   Date: 11/05/2018   Prepared by: Nawaf Washington      Manual Therapy Interventions: ( 35 minutes): Manual interventions performed to improve joint/soft tissue mobility and ROM to improve ability to perform ADLs. Patient responded well to all manual interventions with no significant increase in pain/symptoms. Improved pain reported below. Technique Used Grade  Level # Time(s) Effect while being performed   CPA's  IV-- lower cervical and all thoracic   Increased intervertebral movement    STM  Trigger points of B rhomboids, B upper trap     Decreased tone and pain           DTM  Mid/low trap and rhomboids    Decreased tone, increased movement                  Modalities: 10 min   · Heat application thoracic region pre treatment    Treatment/Session Assessment: Went over all HEP today to determine if correct body mechanics are being used. Mild cuing needed for chin tuck and t-band exercises. Manual focused on right rhomboids and B upper traps. · Response to Treatment:   Pt states no pain at end of session. · Compliance with Program/Exercises: Will assess as treatment progresses.   · Recommendations/Intent for next treatment session:     Future Appointments   Date Time Provider Mita Dumont   12/10/2018 11:15 AM Noemi Villafuerte, PT [facility]   12/13/2018 10:15 AM Noemi Villafuerte PT [facility]   12/14/2018 11:00 AM Christian Brewer MD [place] [facility]   1/4/2019 10:50 AM SFE DEXA BI GE LUNAR DEXA [facility]   4/3/2019 10:20 AM Marianna Marroquin MD [facility]       Total Treatment Duration: 50 min   PT Patient Time In/Time Out  Time In: 1030  Time Out: [place]

## 2018-12-10 ENCOUNTER — APPOINTMENT (OUTPATIENT)
Dept: PHYSICAL THERAPY | Age: 68
End: 2018-12-10
Payer: MEDICARE

## 2018-12-13 ENCOUNTER — APPOINTMENT (OUTPATIENT)
Dept: PHYSICAL THERAPY | Age: 68
End: 2018-12-13
Payer: MEDICARE

## 2019-01-04 ENCOUNTER — HOSPITAL ENCOUNTER (OUTPATIENT)
Dept: MAMMOGRAPHY | Age: 69
Discharge: HOME | End: 2019-01-04
Attending: OBSTETRICS & GYNECOLOGY
Payer: MEDICARE

## 2019-01-04 DIAGNOSIS — M89.9 DISORDER OF BONE AND CARTILAGE: ICD-10-CM

## 2019-01-04 DIAGNOSIS — M94.9 DISORDER OF BONE AND CARTILAGE: ICD-10-CM

## 2019-01-04 PROCEDURE — 77080 DXA BONE DENSITY AXIAL: CPT

## 2019-01-07 NOTE — PROGRESS NOTES
The DEXA scan revealed the bone mineral density to be low approaching osteoporosis.   Calcium, vitamin D, and exercise recommended  Repeat a bone density in 2 years

## 2019-01-11 ENCOUNTER — HOSPITAL ENCOUNTER (OUTPATIENT)
Dept: PHYSICAL THERAPY | Age: 69
Discharge: HOME | End: 2019-01-11
Payer: MEDICARE

## 2019-01-11 PROCEDURE — 97012 MECHANICAL TRACTION THERAPY: CPT

## 2019-01-11 PROCEDURE — 97161 PT EVAL LOW COMPLEX 20 MIN: CPT

## 2019-01-16 ENCOUNTER — HOSPITAL ENCOUNTER (OUTPATIENT)
Dept: PHYSICAL THERAPY | Age: 69
Discharge: HOME | End: 2019-01-16
Payer: MEDICARE

## 2019-01-16 NOTE — PROGRESS NOTES
Darci Patton  : 1950  [place]  Secondary: [address] at . Viola Goldman [address]  [facility]. [address], [address]  [phone]   [phone]      OUTPATIENT DAILY NOTE    NAME/AGE/GENDER: Darci Patton is a 80 y.o. female. DATE: 2019    Patient's daughter called today to state that pt had a death in family and would be canceling this weeks appointments to tend to this. Pt will call to schedule as soon as possible.     Pushpa Hernandez, PT

## 2019-01-18 ENCOUNTER — APPOINTMENT (OUTPATIENT)
Dept: PHYSICAL THERAPY | Age: 69
End: 2019-01-18
Payer: MEDICARE

## 2019-02-06 ENCOUNTER — HOSPITAL ENCOUNTER (OUTPATIENT)
Dept: CT IMAGING | Age: 69
Discharge: HOME | End: 2019-02-06
Attending: INTERNAL MEDICINE
Payer: MEDICARE

## 2019-02-06 DIAGNOSIS — R10.13 EPIGASTRIC ABDOMINAL PAIN: ICD-10-CM

## 2019-02-06 DIAGNOSIS — R93.89 ABNORMAL CT SCAN: ICD-10-CM

## 2019-02-06 LAB — CREAT BLD-MCNC: 0.7 MG/DL (ref 0.8–1.5)

## 2019-02-06 PROCEDURE — 82565 ASSAY OF CREATININE: CPT

## 2019-02-06 RX ORDER — SODIUM CHLORIDE 0.9 % (FLUSH) 0.9 %
10 SYRINGE (ML) INJECTION
Status: COMPLETED | OUTPATIENT
Start: 2019-02-06 | End: 2019-02-06

## 2019-02-06 RX ADMIN — Medication 10 ML: at 11:49

## 2019-03-04 ENCOUNTER — HOSPITAL ENCOUNTER (OUTPATIENT)
Dept: NUTRITION | Age: 69
Discharge: HOME | End: 2019-03-04
Attending: INTERNAL MEDICINE
Payer: SELF-PAY

## 2019-03-04 PROCEDURE — 97802 MEDICAL NUTRITION INDIV IN: CPT

## 2019-03-04 NOTE — PROGRESS NOTES
NUTRITION ASSESSMENT:    FOOD/NUTRITION HISTORY:   Pt eats undetermined amount of meals/days. Food recall on assessment form notes only fluids/water at meals. (Pt reports gas and bloating with foods, but then notes that she has been following a [facility] FODMAPs food list with improvement to some small degree in symptoms). Diet appears low in calcium-containing choices, low in servings of fruits and vegetables,low in fiber, low in protein. Pt drinks 56 oz water/day, 16oz sweetened grisel tea with honey/day,  and glass of cranberry/day. Pt has an exercise regime of walking 2 days/week for 30 minutes. (Used to walk 60 minutes for 6 days/week most of adult life). Pt appears able to obtain appropriate nutritional choices as desired. Factors affecting weight status:   Oral status (dentition): no issues noted   Smell and taste: Pt reports that following a virus with illness in November she lost her sense of smell and taste which affected her appetite. Noted she was very fatigued for 5-6 weeks. These symptoms lead to reduced appetite and weight loss.  Appetite: Pt reports intermittent episodes of \"appetitie coming back. \" Pt reports she experiences reduced appetite if she experiences gas and bloating with food.  Swallow capacity (dysphagia): No issues noted.  Neurological factors (i.e.dementia, Parkinson's, stroke): No issues noted.  Gastrointestinal: Pt reports ongoing gas build up and bloating following meals associated with pain. States she has IBS, although not noted in her medical history.  Social factors and living conditions: Pt reports that her sister passed 1 month ago- very sudden. Notes they were very good friends. States she has an \"emotional destructive\" relationship with  and is presently receiving psychotherapeutic counseling from [facility]. Additionally, pt states she will begin a with a licensed therapist for cognitive behavioral therapeutic counseling.    Food accessibility: No barriers identified. Current Medical Diagnosis:   Weight loss    History of Current Illness:   · Pt is referred by Dr. Neo Rodríguez for nutrition counseling. Is not covered by insurance and agrees to self-pay rates. · Requests assistance with FODMAPs diet in order to tolerate more po intake. · Pt reports diarrhea or \"going to the bathroom\" 3-4 times per day (often in the morning)and notes that onset is related to recent stress (loss of sister and relationship with ). Prior to onset of gas and pain and increased bowel movements, pt reports that she tended to have constipation. Used Mylanta at that time. · Reports that she has had frequent bladder infections and has intermittently been taking antibiotics in past year as well. · Is interested in probiotic recommendations but presently requests assistance with FODMAPs diet. Social/Economic Status/ Living conditions/ Support:  Pt is . Receiving counseling for marriage difficulties. Unclear if pt still resides with . Recently lost her sister (support and a \"friend\") last month r/t flu. Has support through [employer] counselor. Nutritionally Pertinent Past Medical History:   Depression  dyslipidemia  GERD  Osteoarthritis  Esophagitis/ gastritis  Duodenal bulb ulcer    Nutritionally Pertinent Labs:   (10/24/18)    Nutritionally Relevant Medications:  ranitidine    Supplements/Vitamins/Herbs:   Calcium with vitamin D3  Multivitamin  Turmeric/ curcumin  Glucosamine chondroitin  Ubiquinol  Vitamin D3  Melatonin  CBD oil    ANTHROPOMETRIC DATA:  Ht: 5'3 (1.6 m)  Wt: 124lb 9.6oz  (56.6 kg)  BMI: 22.1 (Low normal weight BMI class)   BMI of >22 recommended for geriatrics pts. Optimal BMI: 23-30. IBW for minimum BMI 23: 130lb   Any recent rapid wt loss or gain:   · Pt reports losing 10# when she began a walking program with a friend () in the spring of last year.    · Appears weight continued to trend down at a greater rate after that time (5/14/18). · Pt has lost 23 lb over the past 3 years. Pt reports that she has been having counseling for past 3 years with regard to relationship with  (\"emotionally destructive marriage. \")   In the last 6 months appears that pt has a ~17 lb weight loss which is 12% and significant for malnutrition.     Weight history in [facility]:  WT / BMI WEIGHT BODY MASS INDEX   2/25/2019 124 lb 9.6 oz 22.07 kg/m2   10/24/2018 136 lb 9.6 oz 24.2 kg/m2   9/17/2018 139 lb 24.62 kg/m2   8/22/2018 141 lb 24.98 kg/m2   5/14/2018 151 lb 6.4 oz 26.82 kg/m2   4/2/2018 153 lb 27.1 kg/m2   1/15/2018 152 lb 27.36 kg/m2   12/14/2017 151 lb 29.49 kg/m2   11/14/2017 156 lb 6.4 oz 27.27 kg/m2   8/2/2017 151 lb 26.33 kg/m2   7/24/2017 153 lb 3.2 oz 26.71 kg/m2   5/12/2017 149 lb 9.6 oz 26.08 kg/m2   3/31/2017 149 lb 25.98 kg/m2   8/24/2015 158 lb 27.55 kg/m2   8/12/2015 161 lb 9.6 oz 28.17 kg/m2     Meets Criteria for Social/Environmental Related Malnutrition   [x] Non-Severe (Moderate) Malnutrition, as evidenced by:   [x] Mild loss of muscle mass   [x] Nutritional intake of <75% of energy intake compared to estimated energy needs for > 3 months   [x] Weight loss of 5% in 1 month, 7.5% in 3 months, 10% in 6 months, or 20% in 12 months   [] Mild edema   [] Mild loss of subcutaneous fat     Estimated Nutritional Needs to Maintain Current Wt using [place] [address]. Jeor Equation, activity factor of 1.2:   EER: 1291 kcal/day +/- 10% MSJ margin of error     EER for weight gain: 1300 + 500 = 1800 kcal/day  Carbohydrates: 225 gm/day (50% of  kcal) or ~45 gm/meal(3) with 30gm/snack (3) (Approximately 400kcal/meal with 200 kcal/snack)  Protein: 71- 84gm/day (1.2-1.4 gm/kg IBW for BMI 23)  Fat: 62 gm/day (31% of kcal)  Fluids: 1.8 L/day (1 ml/kcal)    NUTRITION DIAGNOSIS:   Inadequate oral intake r/t decreased tolerance for foods as evidenced by pt's report of gas and pain as barrier to adequate intake and 12% weight loss in past 6 months. NUTRITION INTERVENTION:   Appointment length: 60 minutes. Nutrition Education:    Discussed pts intake and activity patterns as above. Gave details for weight gain strategies which include use of calorie dense foods, strategy of 3 planned meals and 3 planned snacks, and use of commercial nutrition supplement beverage.  Addressed barrier of gas and bloating following po intake and per pt request for assistance with FODMAPs diet. Reviewed FODMAPs Elimination Guide and instructions for use. (Reviewed Day 1- patient verbalized good understanding. Explained the goal of strict adherence in order to observe for greatly reduced or eliminated symptoms. Gave food logs for tracking meals/ snacks and symptoms following. Emphasized the significance of keeping the logs. Noted that trials to add back foods will be the next step if symptoms are greatly reduced. Motivational Interviewing:   · Discussed pt present health risks with low BMI and malnutrition. Explained benefits of increasing wt and improving BMI class. · Discussed barriers pt noted for emotional issues that are affecting pt's bowel movements per pt (Pt states she is scheduled for an appointment to begin cognitive behavioral therapy with a licensed provider). · When asked if pt realistically thinks she can purchase the food items on the grocery list and follow the plan, pt notes \"It will give me something to do. \"   · Although when asked about goal setting and next steps pt will take, pt notes that she will \"go over the plan more closely\" and decide for a day to purchase items. Agrees to a follow-up appointment ~ 20days out for evaluation after following the plan. Goal: Pt will increase caloric intake and structure eating patterns and food choices to promote wt gain @ 0.5-1#/week. Plan: Pt will use FODMAPs meal plan to follow FODMAPS elimination diet and consume adequate balnced nutrition.  Advised pt to repeat snack on meal plan and to add extra protein portion to meals to promote weight gain. Materials Given:  Tips/Strategies for Weight Gain  [facility] guide (explanantion of acronym)  FODMAPs Elimination Guide (Instructions, Menu, Recipes, grocery list). Food and Symptoms logs   Omega 3 Fats and Mood    MONITORING AND EVALUATION DETAILS:  Follow up appt: 3/22/19 @ 11:00am  Follow-up Monitoring Plans: Will monitor any wt change. Will assess and address any barriers to or success with plans. Will review meals and snack logs to compare with FODMAP guide. Assess for progression to reintroduction.     Danna Councilman, MS, [address], [address], [place]  W: [phone]  C: [phone]

## 2019-03-08 ENCOUNTER — APPOINTMENT (RX ONLY)
Dept: URBAN - METROPOLITAN AREA CLINIC 349 | Facility: CLINIC | Age: 69
Setting detail: DERMATOLOGY
End: 2019-03-08

## 2019-03-08 DIAGNOSIS — L21.8 OTHER SEBORRHEIC DERMATITIS: ICD-10-CM

## 2019-03-08 DIAGNOSIS — L23.9 ALLERGIC CONTACT DERMATITIS, UNSPECIFIED CAUSE: ICD-10-CM | Status: INADEQUATELY CONTROLLED

## 2019-03-08 DIAGNOSIS — L71.8 OTHER ROSACEA: ICD-10-CM

## 2019-03-08 DIAGNOSIS — Z85.828 PERSONAL HISTORY OF OTHER MALIGNANT NEOPLASM OF SKIN: ICD-10-CM

## 2019-03-08 PROCEDURE — ? PRESCRIPTION

## 2019-03-08 PROCEDURE — ? COUNSELING

## 2019-03-08 PROCEDURE — ? RECOMMENDATIONS

## 2019-03-08 PROCEDURE — 99213 OFFICE O/P EST LOW 20 MIN: CPT

## 2019-03-08 RX ORDER — AMLODIPINE BESYLATE 2.5 MG/1
TABLET ORAL
Qty: 1 | Refills: 1 | Status: ERX

## 2019-03-08 RX ORDER — METRONIDAZOLE 7.5 MG/G
CREAM TOPICAL
Qty: 1 | Refills: 4 | Status: ERX

## 2019-03-08 ASSESSMENT — LOCATION DETAILED DESCRIPTION DERM
LOCATION DETAILED: LEFT ANTERIOR PROXIMAL THIGH
LOCATION DETAILED: LEFT INFERIOR CENTRAL MALAR CHEEK
LOCATION DETAILED: RIGHT MEDIAL MALAR CHEEK
LOCATION DETAILED: NASAL DORSUM
LOCATION DETAILED: MID-FRONTAL SCALP
LOCATION DETAILED: LEFT ANTERIOR PROXIMAL THIGH
LOCATION DETAILED: SUPERIOR MID FOREHEAD

## 2019-03-08 ASSESSMENT — LOCATION ZONE DERM
LOCATION ZONE: LEG
LOCATION ZONE: LEG
LOCATION ZONE: SCALP
LOCATION ZONE: FACE
LOCATION ZONE: FACE
LOCATION ZONE: NOSE

## 2019-03-08 ASSESSMENT — LOCATION SIMPLE DESCRIPTION DERM
LOCATION SIMPLE: NOSE
LOCATION SIMPLE: SUPERIOR FOREHEAD
LOCATION SIMPLE: LEFT THIGH
LOCATION SIMPLE: ANTERIOR SCALP
LOCATION SIMPLE: RIGHT CHEEK
LOCATION SIMPLE: LEFT THIGH
LOCATION SIMPLE: LEFT CHEEK

## 2019-03-11 ENCOUNTER — DOCUMENTATION ONLY (OUTPATIENT)
Dept: NUTRITION | Age: 69
End: 2019-03-11

## 2019-03-11 NOTE — PROGRESS NOTES
Nutrition Counseling:  Pt referred by Dr. Irma Singleton with a referral diagnosis of weight loss. Pt called today with questions regarding her use of a FODMAPs diet pt has been attempting to address the gas, bloating, severe pain that she feels is contributing to some degree to her loss of weight. Pt provided feedback that she has been following the use of a low FODMAPs list, but not following completely the 2 week FODMAPs elimination diet program presented to pt (menu with planned meals, grocery list, tracking symptoms). States she does not have the energy to find and purchase all the foods (or prep) according to the plan. Pt is using some recipes and some meal ideas and follows the FODMAPs recommended foods. Pt reports much less gas (almost no gas, bloating, pain), but continues with diarrhea daily and \"feels bad; tired\". (Oral rehydration solutions discussed). Pt asked if her diarrhea would resolve when the stress of having lost her sister recently is resolved with the therapy she is presently seeking. Was unable to advise pt on that. States she is now on an anti-depressant medication and is undergoing therapeutic counseling). Discussed the timeline of patient's history of IBS, recent gas and bloating, and recent diarrhea:    · Age 63 diagnosed with IBS - Notes most of her life she has dealt with constipation rather than the recent change to gas, bloating, and diarrhea. · Gas and bloating with pain began about a month ago and pt experienced sharp severe symptoms for nearly 2 weeks before following a low FODMAPS diet. · Pt reports a couple of rounds of antibiotics in the past 3-4 months to teat recurrent bladder infection; also a prescribed antibiotic \"to use occasionally to prevent a bladder infection. \"  · Has not taken any probiotics since use of antibiotics. · Pt's sister passed approximately a month and a half ago during which pt states her diarrhea began (along with gas and bloating).    · After switching to foods on low FODMAPs list, pt states she is not experiencing gas, bloating, and pain. · Pt states diarrhea continues daily since onset. · Has been logging her foods for several weeks and now is logging bowel activity and description. · Has been given information at first appointment here on possibility of small intestine bacteria overgrowth to discuss with her gastroenterologist for his discretion for any assessment. Has an appointment within the next week with gastroenterologist..      Discussed the purpose of a FODMAPs elimination diet. Advised the strategy is to greatly reduce or eliminate symptoms and then to begin structured trials to re-introduce foods that may be tolerated. Pt has a follow-up appointment on 3/22/19 during which pt is advised we can continue work with FODMAPs diet and possibly reintroducing foods. Also noted logs can be assessed for adequacy to regain lost weight to an optimal weight with a BMI range of 23-30. Pt advised she will call to discuss needs for upcoming follow-up appointment.     Raquel Small, MS, RD, CSSD, LD  W: [phone]

## 2019-03-21 ENCOUNTER — DOCUMENTATION ONLY (OUTPATIENT)
Dept: NUTRITION | Age: 69
End: 2019-03-21

## 2019-03-21 NOTE — PROGRESS NOTES
Nutrition Counseling:  Pt referred by Dr. Angelo Wu. Pt left VM to state that she has been following the FODMAPs meal plan, but is \"not feeling much better. \" States she wants to Samantha this a little longer. \"  Cancelled her appt and advised scheduling that she will call to r/s as needed.     Hernandez Jimenez MS, RD, CSSD, LD  W: [phone]

## 2019-04-02 ENCOUNTER — TELEPHONE (OUTPATIENT)
Dept: NUTRITION | Age: 69
End: 2019-04-02

## 2019-04-02 NOTE — TELEPHONE ENCOUNTER
Nutrition Counseling:  Pt called and left VM with a questions regarding omega 3 fats. Left VM for pt and asked for a return call.     Octavio Thurman MS, RD, CSSD, LD  W: [phone]

## 2019-04-24 ENCOUNTER — TELEPHONE (OUTPATIENT)
Dept: NUTRITION | Age: 69
End: 2019-04-24

## 2019-04-24 NOTE — TELEPHONE ENCOUNTER
Nutrition Counseling:  Pt referred by Dr. Stella Reynolds. Pt called to give details for the results of a hydrogen breath test resulting in a positive result for SIBO. (Had advised pt at initial appointment to discuss a test with her gastroenterologist based on patient's symptoms, recent and extended medical history and minimal relief from use of a FODMAPs elimination diet). Pt had questions regarding what she has read about the treatment and success rates with use of Xifaxan. States she has a family member who is a nurse who also looked into the drug. Referred pt to [facility] website (Dr. Mando Joshi, integrative gastroenterologist and published researcher) for reference in discussing her case with medical practitioners. . Advised pt that I cannot give guidance on medication use or on addressing any medical etiologies that may cause the SIBO to return after treatment (as research notes happens in many cases). Encouraged pt to take concerns to her gastroenterologist or PCP for guidance.      Jose Enrique Tang, MS, RD, CSSD, LD  W: [phone]

## 2019-05-14 NOTE — THERAPY DISCHARGE
Darci Patton  : 1950  [place] / [place] / Product Type: Medicare /    [address]. [facility], [address], [facility], [address] [place] [address]  [phone]   [phone]          OUTPATIENT PHYSICAL THERAPY:Discontinuation Summary 2019     ICD-10: Treatment Diagnosis: Pain in Left Shoulder (M25.512) Cervicalgia (M54.2)  Abnormal posture (R29.3)  thoracic pain     Precautions/Allergies:   Nickel   Fall Risk Score: 1 (? 5 = High Risk)  MD Orders: evaluate and treat MEDICAL/REFERRING DIAGNOSIS:  Radiculopathy, cervical region [M54.12]  Cervicalgia [M54.2]  Other cervical disc degeneration, unspecified cervical region [M50.30]  DATE OF ONSET: 9 months   REFERRING PHYSICIAN: Jayshree ROSARIO*  RETURN PHYSICIAN APPOINTMENT: not specified    Summary: Darci Patton was seen in physical therapy on 19 for a evaluation of left shoulder pain, and neck/upper back pain. She was scheduled for reoccurring visits however, called latera to cancel due to a death in the family. She stated that she would call to reschedule however no communication made thereafter. Treatment has been discontinued and chart is discharged at this time.         Thank you for this referral.     Pushpa Hernandez, PT

## 2019-05-21 PROBLEM — N76.6 GENITAL ULCER, FEMALE: Status: ACTIVE | Noted: 2019-05-21

## 2019-06-05 ENCOUNTER — HOSPITAL ENCOUNTER (OUTPATIENT)
Dept: MAMMOGRAPHY | Age: 69
Discharge: HOME | End: 2019-06-05
Attending: OBSTETRICS & GYNECOLOGY
Payer: MEDICARE

## 2019-06-05 DIAGNOSIS — Z12.39 SCREENING FOR BREAST CANCER: ICD-10-CM

## 2019-06-05 PROCEDURE — 77067 SCR MAMMO BI INCL CAD: CPT

## 2019-06-10 ENCOUNTER — DOCUMENTATION ONLY (OUTPATIENT)
Dept: NUTRITION | Age: 69
End: 2019-06-10

## 2019-06-10 NOTE — PROGRESS NOTES
Nutrition Counseling:  Pt referred by Dr. Manfred Ramirez. Pt has not returned a call to schedule a f/u  appt. Will close referral on this end.     Kaleigh Boyle MS, [address] [place] [address], [address] [place] [address] Cm, LD  W: [phone]  C: [phone]

## 2019-09-06 ENCOUNTER — RX ONLY (OUTPATIENT)
Age: 69
Setting detail: RX ONLY
End: 2019-09-06

## 2019-09-06 ENCOUNTER — APPOINTMENT (RX ONLY)
Dept: URBAN - METROPOLITAN AREA CLINIC 349 | Facility: CLINIC | Age: 69
Setting detail: DERMATOLOGY
End: 2019-09-06

## 2019-09-06 DIAGNOSIS — Z85.828 PERSONAL HISTORY OF OTHER MALIGNANT NEOPLASM OF SKIN: ICD-10-CM

## 2019-09-06 DIAGNOSIS — L21.8 OTHER SEBORRHEIC DERMATITIS: ICD-10-CM

## 2019-09-06 DIAGNOSIS — L71.8 OTHER ROSACEA: ICD-10-CM | Status: STABLE

## 2019-09-06 DIAGNOSIS — D22 MELANOCYTIC NEVI: ICD-10-CM

## 2019-09-06 PROBLEM — D22.9 MELANOCYTIC NEVI, UNSPECIFIED: Status: ACTIVE | Noted: 2019-09-06

## 2019-09-06 PROCEDURE — ? COUNSELING

## 2019-09-06 PROCEDURE — 99213 OFFICE O/P EST LOW 20 MIN: CPT

## 2019-09-06 PROCEDURE — ? TREATMENT REGIMEN

## 2019-09-06 PROCEDURE — ? RECOMMENDATIONS

## 2019-09-06 RX ORDER — METRONIDAZOLE 7.5 MG/G
CREAM TOPICAL
Qty: 1 | Refills: 4 | Status: ERX

## 2019-09-06 ASSESSMENT — LOCATION DETAILED DESCRIPTION DERM
LOCATION DETAILED: GLABELLA
LOCATION DETAILED: LEFT ANTERIOR PROXIMAL THIGH
LOCATION DETAILED: RIGHT INFERIOR MEDIAL FOREHEAD
LOCATION DETAILED: LEFT LOWER CUTANEOUS LIP
LOCATION DETAILED: RIGHT SUPERIOR PARIETAL SCALP
LOCATION DETAILED: LEFT INFERIOR CENTRAL MALAR CHEEK
LOCATION DETAILED: LEFT ANTERIOR PROXIMAL THIGH
LOCATION DETAILED: LEFT LOWER CUTANEOUS LIP
LOCATION DETAILED: LEFT MEDIAL FRONTAL SCALP
LOCATION DETAILED: RIGHT MEDIAL MALAR CHEEK
LOCATION DETAILED: NASAL DORSUM

## 2019-09-06 ASSESSMENT — LOCATION ZONE DERM
LOCATION ZONE: FACE
LOCATION ZONE: FACE
LOCATION ZONE: SCALP
LOCATION ZONE: NOSE
LOCATION ZONE: LEG
LOCATION ZONE: LIP
LOCATION ZONE: LEG
LOCATION ZONE: LIP

## 2019-09-06 ASSESSMENT — LOCATION SIMPLE DESCRIPTION DERM
LOCATION SIMPLE: NOSE
LOCATION SIMPLE: LEFT THIGH
LOCATION SIMPLE: SCALP
LOCATION SIMPLE: LEFT THIGH
LOCATION SIMPLE: RIGHT FOREHEAD
LOCATION SIMPLE: LEFT CHEEK
LOCATION SIMPLE: LEFT LIP
LOCATION SIMPLE: LEFT SCALP
LOCATION SIMPLE: GLABELLA
LOCATION SIMPLE: RIGHT CHEEK
LOCATION SIMPLE: LEFT LIP

## 2019-11-18 PROBLEM — G60.8 SENSORY POLYNEUROPATHY: Status: ACTIVE | Noted: 2019-11-18

## 2019-11-20 PROBLEM — R29.898 BILATERAL LEG WEAKNESS: Status: ACTIVE | Noted: 2019-11-20

## 2020-07-06 ENCOUNTER — HOSPITAL ENCOUNTER (OUTPATIENT)
Dept: MAMMOGRAPHY | Age: 70
Discharge: HOME | End: 2020-07-06
Attending: OBSTETRICS & GYNECOLOGY
Payer: MEDICARE

## 2020-07-06 DIAGNOSIS — N63.20 LEFT BREAST LUMP: ICD-10-CM

## 2020-07-06 DIAGNOSIS — D24.2 BENIGN NEOPLASM OF LEFT BREAST: ICD-10-CM

## 2020-07-06 PROCEDURE — 76642 ULTRASOUND BREAST LIMITED: CPT

## 2020-07-06 PROCEDURE — 77066 DX MAMMO INCL CAD BI: CPT

## 2020-09-14 ENCOUNTER — HOSPITAL ENCOUNTER (OUTPATIENT)
Dept: MRI IMAGING | Age: 70
Discharge: HOME | End: 2020-09-14
Attending: INTERNAL MEDICINE
Payer: MEDICARE

## 2020-09-14 DIAGNOSIS — M79.604 BILATERAL LEG PAIN: ICD-10-CM

## 2020-09-14 DIAGNOSIS — M54.50 ACUTE RIGHT-SIDED LOW BACK PAIN WITHOUT SCIATICA: ICD-10-CM

## 2020-09-14 DIAGNOSIS — G60.8 SENSORY POLYNEUROPATHY: ICD-10-CM

## 2020-09-14 DIAGNOSIS — M79.605 BILATERAL LEG PAIN: ICD-10-CM

## 2020-09-14 PROCEDURE — 72148 MRI LUMBAR SPINE W/O DYE: CPT

## 2020-09-14 NOTE — PROGRESS NOTES
MRI reports mild degenerative facet arthropathy and mild right foramina narrowing. Terminology for some arthritis. Nothing surgical however. Liver cyst incidentally seen. No concerns!

## 2020-09-22 PROBLEM — M54.41 LOW BACK PAIN WITH BILATERAL SCIATICA: Status: ACTIVE | Noted: 2020-09-22

## 2020-09-22 PROBLEM — M54.42 LOW BACK PAIN WITH BILATERAL SCIATICA: Status: ACTIVE | Noted: 2020-09-22

## 2020-10-27 PROBLEM — G62.9 SENSORY NEUROPATHY: Status: ACTIVE | Noted: 2020-10-27

## 2021-06-21 ENCOUNTER — TRANSCRIBE ORDER (OUTPATIENT)
Dept: SCHEDULING | Age: 71
End: 2021-06-21

## 2021-06-21 DIAGNOSIS — Z12.31 VISIT FOR SCREENING MAMMOGRAM: Primary | ICD-10-CM

## 2021-07-22 ENCOUNTER — HOSPITAL ENCOUNTER (OUTPATIENT)
Dept: MAMMOGRAPHY | Age: 71
Discharge: HOME | End: 2021-07-22
Attending: OBSTETRICS & GYNECOLOGY
Payer: MEDICARE

## 2021-07-22 DIAGNOSIS — Z12.31 VISIT FOR SCREENING MAMMOGRAM: ICD-10-CM

## 2021-07-22 PROCEDURE — 77067 SCR MAMMO BI INCL CAD: CPT

## 2021-09-08 ENCOUNTER — TRANSCRIBE ORDER (OUTPATIENT)
Dept: SCHEDULING | Age: 71
End: 2021-09-08

## 2021-09-08 DIAGNOSIS — Z78.0 ASYMPTOMATIC MENOPAUSE: Primary | ICD-10-CM

## 2021-10-29 ENCOUNTER — HOSPITAL ENCOUNTER (OUTPATIENT)
Dept: MAMMOGRAPHY | Age: 71
Discharge: HOME | End: 2021-10-29
Attending: NURSE PRACTITIONER
Payer: MEDICARE

## 2021-10-29 DIAGNOSIS — Z78.0 ASYMPTOMATIC MENOPAUSE: ICD-10-CM

## 2021-10-29 PROCEDURE — 77080 DXA BONE DENSITY AXIAL: CPT

## 2021-11-19 ENCOUNTER — HOSPITAL ENCOUNTER (OUTPATIENT)
Dept: MRI IMAGING | Age: 71
Discharge: HOME | End: 2021-11-19
Attending: PSYCHIATRY & NEUROLOGY
Payer: MEDICARE

## 2021-11-19 DIAGNOSIS — R41.3 MEMORY DIFFICULTIES: ICD-10-CM

## 2021-11-19 PROCEDURE — 70551 MRI BRAIN STEM W/O DYE: CPT

## 2021-11-22 NOTE — PROGRESS NOTES
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 Otherwise looked good. You had recent echocardiogram which was normal.  I will arrange CT angiogram of the head and neck to complete stroke work-up but if you had CT angiogram of head and neck study done in the past please call us and cancel.

## 2021-12-03 ENCOUNTER — HOSPITAL ENCOUNTER (OUTPATIENT)
Dept: CT IMAGING | Age: 71
Discharge: HOME | End: 2021-12-03
Attending: PSYCHIATRY & NEUROLOGY
Payer: MEDICARE

## 2021-12-03 DIAGNOSIS — R41.3 MEMORY DIFFICULTIES: ICD-10-CM

## 2021-12-03 DIAGNOSIS — Z86.73 OLD CEREBRAL INFARCT WITHOUT RESIDUAL DEFICIT: ICD-10-CM

## 2021-12-03 LAB — CREAT BLD-MCNC: 0.58 MG/DL (ref 0.8–1.5)

## 2021-12-03 PROCEDURE — 74011000636 HC RX REV CODE- 636: Performed by: PSYCHIATRY & NEUROLOGY

## 2021-12-03 PROCEDURE — 74011000258 HC RX REV CODE- 258: Performed by: PSYCHIATRY & NEUROLOGY

## 2021-12-03 PROCEDURE — 70498 CT ANGIOGRAPHY NECK: CPT

## 2021-12-03 PROCEDURE — 82565 ASSAY OF CREATININE: CPT

## 2021-12-03 RX ORDER — SODIUM CHLORIDE 0.9 % (FLUSH) 0.9 %
10 SYRINGE (ML) INJECTION
Status: COMPLETED | OUTPATIENT
Start: 2021-12-03 | End: 2021-12-03

## 2021-12-03 RX ADMIN — IOPAMIDOL 100 ML: 755 INJECTION, SOLUTION INTRAVENOUS at 15:26

## 2021-12-03 RX ADMIN — Medication 10 ML: at 15:26

## 2021-12-03 RX ADMIN — SODIUM CHLORIDE 100 ML: 9 INJECTION, SOLUTION INTRAVENOUS at 15:26

## 2021-12-03 NOTE — PROGRESS NOTES
CT angiogram overall looks good, some age-related mild atherosclerotic changes in carotid arteries, otherwise normal range, no blockage, no aneurysm.

## 2021-12-06 PROBLEM — Z86.73 OLD CEREBROVASCULAR ACCIDENT (CVA) WITHOUT LATE EFFECT: Status: ACTIVE | Noted: 2021-12-06

## 2021-12-06 PROBLEM — R41.89 BRAIN FOG: Status: ACTIVE | Noted: 2021-12-06

## 2021-12-14 ENCOUNTER — TRANSCRIBE ORDER (OUTPATIENT)
Dept: SCHEDULING | Age: 71
End: 2021-12-14

## 2021-12-14 DIAGNOSIS — M35.00 SICCA (HCC): Primary | ICD-10-CM

## 2021-12-21 ENCOUNTER — HOSPITAL ENCOUNTER (OUTPATIENT)
Dept: ULTRASOUND IMAGING | Age: 71
Discharge: HOME | End: 2021-12-21
Attending: INTERNAL MEDICINE

## 2021-12-21 DIAGNOSIS — M35.00 SICCA (HCC): ICD-10-CM

## 2022-03-18 PROBLEM — M54.42 LOW BACK PAIN WITH BILATERAL SCIATICA: Status: ACTIVE | Noted: 2020-09-22

## 2022-03-18 PROBLEM — N95.2 ATROPHIC VAGINITIS: Status: ACTIVE | Noted: 2017-03-31

## 2022-03-18 PROBLEM — R29.898 BILATERAL LEG WEAKNESS: Status: ACTIVE | Noted: 2019-11-20

## 2022-03-18 PROBLEM — Z86.73 OLD CEREBROVASCULAR ACCIDENT (CVA) WITHOUT LATE EFFECT: Status: ACTIVE | Noted: 2021-12-06

## 2022-03-18 PROBLEM — G62.9 SENSORY NEUROPATHY: Status: ACTIVE | Noted: 2020-10-27

## 2022-03-18 PROBLEM — M54.41 LOW BACK PAIN WITH BILATERAL SCIATICA: Status: ACTIVE | Noted: 2020-09-22

## 2022-03-19 PROBLEM — I25.119 CORONARY ARTERY DISEASE INVOLVING NATIVE CORONARY ARTERY OF NATIVE HEART WITH ANGINA PECTORIS (HCC): Status: ACTIVE | Noted: 2017-12-27

## 2022-03-19 PROBLEM — I72.9 ANEURYSM (HCC): Status: ACTIVE | Noted: 2018-09-17

## 2022-03-19 PROBLEM — I34.1 MVP (MITRAL VALVE PROLAPSE): Status: ACTIVE | Noted: 2018-01-16

## 2022-03-19 PROBLEM — R53.82 CHRONIC FATIGUE: Status: ACTIVE | Noted: 2017-12-27

## 2022-03-19 PROBLEM — N76.6 GENITAL ULCER, FEMALE: Status: ACTIVE | Noted: 2019-05-21

## 2022-03-20 PROBLEM — G60.8 SENSORY POLYNEUROPATHY: Status: ACTIVE | Noted: 2019-11-18

## 2022-03-20 PROBLEM — R41.89 BRAIN FOG: Status: ACTIVE | Noted: 2021-12-06

## 2022-06-06 ENCOUNTER — OFFICE VISIT (OUTPATIENT)
Dept: NEUROLOGY | Age: 72
End: 2022-06-06
Payer: MEDICARE

## 2022-06-06 VITALS
SYSTOLIC BLOOD PRESSURE: 113 MMHG | WEIGHT: 138 LBS | DIASTOLIC BLOOD PRESSURE: 75 MMHG | HEART RATE: 67 BPM | HEIGHT: 63 IN | BODY MASS INDEX: 24.45 KG/M2

## 2022-06-06 DIAGNOSIS — R41.3 MEMORY DIFFICULTIES: ICD-10-CM

## 2022-06-06 DIAGNOSIS — M54.2 CHRONIC NECK AND BACK PAIN: ICD-10-CM

## 2022-06-06 DIAGNOSIS — M54.9 CHRONIC NECK AND BACK PAIN: ICD-10-CM

## 2022-06-06 DIAGNOSIS — G60.8 SENSORY POLYNEUROPATHY: Primary | ICD-10-CM

## 2022-06-06 DIAGNOSIS — G89.29 CHRONIC NECK AND BACK PAIN: ICD-10-CM

## 2022-06-06 PROCEDURE — 3017F COLORECTAL CA SCREEN DOC REV: CPT | Performed by: PSYCHIATRY & NEUROLOGY

## 2022-06-06 PROCEDURE — 1036F TOBACCO NON-USER: CPT | Performed by: PSYCHIATRY & NEUROLOGY

## 2022-06-06 PROCEDURE — G8427 DOCREV CUR MEDS BY ELIG CLIN: HCPCS | Performed by: PSYCHIATRY & NEUROLOGY

## 2022-06-06 PROCEDURE — 1123F ACP DISCUSS/DSCN MKR DOCD: CPT | Performed by: PSYCHIATRY & NEUROLOGY

## 2022-06-06 PROCEDURE — 1090F PRES/ABSN URINE INCON ASSESS: CPT | Performed by: PSYCHIATRY & NEUROLOGY

## 2022-06-06 PROCEDURE — 99215 OFFICE O/P EST HI 40 MIN: CPT | Performed by: PSYCHIATRY & NEUROLOGY

## 2022-06-06 PROCEDURE — G8399 PT W/DXA RESULTS DOCUMENT: HCPCS | Performed by: PSYCHIATRY & NEUROLOGY

## 2022-06-06 PROCEDURE — G8420 CALC BMI NORM PARAMETERS: HCPCS | Performed by: PSYCHIATRY & NEUROLOGY

## 2022-06-06 RX ORDER — PREGABALIN 75 MG/1
50 CAPSULE ORAL DAILY
COMMUNITY

## 2022-06-06 ASSESSMENT — ENCOUNTER SYMPTOMS
ALLERGIC/IMMUNOLOGIC NEGATIVE: 1
BACK PAIN: 1
GASTROINTESTINAL NEGATIVE: 1
EYES NEGATIVE: 1
RESPIRATORY NEGATIVE: 1

## 2022-06-06 ASSESSMENT — VISUAL ACUITY: VA_NORMAL: 1

## 2022-06-06 NOTE — PROGRESS NOTES
8/5/1960  Evelio Patience 71 y.o. female      Chief Complaint:  Chief Complaint   Patient presents with    Follow-up     Paresthesias          Followup Note:   Many years of numbness tingling in feet, intermittent in the beginning and gradually became constant asso with occasionally pain. In hands numbness in finger tips. Able to sleep with ambien. Walking like a duck, careful, imbalance. Exercises twice a week, walking in neighborhood, body and joint pain keep her from going per pt. Lyrica 75 mg bid, no effect; tried DC and gave her problem. Tests showed no Sjogren's, rheumatologist is following her. Just had labs done this morning at PCP office. Lives alone, memory difficulty, confused sometimes, reduced social/going to [place] since [facility] pandemic years. Left parietal headaches and pain in left eye about 2-3 times a week past 6-8 months, no glaucoma. No hx migraine. Reaction rash to Gabapentin. Gained wt from taking Lyrica. Review Test Results: I have reviewed ncs last November BLE- slowly progression of distal sensory polyneuropathy, and last December BUE- normal.       Current Outpatient Medications   Medication Sig Dispense Refill    pregabalin (LYRICA) 75 MG capsule Take 75 mg by mouth 2 times daily.  aluminum & magnesium hydroxide-simethicone (MAALOX) 200-200-20 MG/5ML SUSP suspension Take 15 mLs by mouth as needed      docusate (COLACE, DULCOLAX) 100 MG CAPS Take 300 mg by mouth      hydrocortisone 2.5 % cream INSERT CREAM INTO RECTUM FOUR TIMES DAILY AS DIRECTED      Lifitegrast 5 % SOLN PRN      LORazepam (ATIVAN) 0.5 MG tablet Take 0.5-1 tablets by mouth 2 times daily as needed.       nortriptyline (PAMELOR) 10 MG capsule TAKE 1 CAPSULE BY MOUTH IN THE EVENING WITH EVENING MEAL      nystatin (MYCOSTATIN) 279119 UNIT/GM cream Apply topically 2 times daily      polyethylene glycol (GLYCOLAX) 17 GM/SCOOP powder Take 17 g by mouth daily      triamcinolone (KENALOG) 0.1 % cream Apply topically 2 times daily      zolpidem (AMBIEN) 10 MG tablet Take 10 mg by mouth.  hyoscyamine (OSCIMIN) 125 MCG TBDP dispersible tablet PLACE 1 TABLET ON THE TONGUE AND ALLOW TO DISSOLVE TWICE DAILY AS NEEDED (Patient not taking: Reported on 6/6/2022)       No current facility-administered medications for this visit. Allergies   Allergen Reactions    Duloxetine Other (See Comments)     \"something going on with head'    Lactose Other (See Comments)    Sucralose Other (See Comments)    Gabapentin Rash    Nickel Rash         Review of Systems:  Review of Systems   Constitutional: Negative. HENT: Negative. Eyes: Negative. Respiratory: Negative. Cardiovascular: Negative. Gastrointestinal: Negative. Endocrine: Negative. Genitourinary: Negative. Musculoskeletal: Positive for back pain, myalgias and neck pain. Skin: Negative. Allergic/Immunologic: Negative. Neurological: Positive for dizziness, light-headedness, numbness and headaches. Hematological: Negative. Psychiatric/Behavioral: Positive for sleep disturbance. No flowsheet data found. No flowsheet data found. Examination:  Vitals:    06/06/22 1225   BP: 113/75   Site: Left Upper Arm   Position: Sitting   Pulse: 67   Weight: 138 lb (62.6 kg)   Height: 5' 3\" (1.6 m)        Physical Exam  Eyes:      Extraocular Movements: EOM normal.      Pupils: Pupils are equal, round, and reactive to light. Neurological:      Mental Status: She is oriented to person, place, and time. Coordination: Finger-Nose-Finger Test normal.      Deep Tendon Reflexes: Strength normal.      Reflex Scores:       Tricep reflexes are 2+ on the right side and 2+ on the left side. Bicep reflexes are 2+ on the right side and 2+ on the left side. Brachioradialis reflexes are 2+ on the right side and 2+ on the left side. Patellar reflexes are 2+ on the right side and 2+ on the left side.        Achilles reflexes are 2+ on the right side and 2+ on the left side. Psychiatric:         Speech: Speech normal.          Neurologic Exam     Mental Status   Oriented to person, place, and time. Oriented to city and area. Oriented to year, month and day. Concentration: normal.   Speech: speech is normal   Level of consciousness: alert  Knowledge: good. Cranial Nerves     CN II   Visual fields full to confrontation. Visual acuity: normal  Right visual field deficit: none  Left visual field deficit: none     CN III, IV, VI   Pupils are equal, round, and reactive to light. Extraocular motions are normal.   Right pupil: Size: 3 mm. Reactivity: brisk. Left pupil: Size: 3 mm. Reactivity: brisk. Nystagmus: none   Diplopia: none  Ophthalmoparesis: none  Upgaze: normal    CN V   Facial sensation intact. CN VII   Facial expression full, symmetric. CN VIII   CN VIII normal.     CN IX, X   CN IX normal.   CN X normal.     CN XI   CN XI normal.     CN XII   CN XII normal.     Gaze provoked nystagmus to the right. Motor Exam   Muscle bulk: normal  Overall muscle tone: normal  Right arm pronator drift: absent  Left arm pronator drift: absent    Strength   Strength 5/5 throughout. Finger tapping normal, right-handed. EHL normal bilaterally. Sensory Exam   Right arm light touch: normal  Left arm light touch: normal  Right leg light touch: decreased from toes  Left leg light touch: decreased from toes  Vibration normal.   Right arm pinprick: decreased from fingers  Right leg pinprick: decreased from toes  Left leg pinprick: decreased from toes  Pinprick reduced on the right latera 2 finger tips, reduced on all distal toes.       Gait, Coordination, and Reflexes     Gait  Gait: non-neurologic    Coordination   Finger to nose coordination: normal    Tremor   Resting tremor: absent  Intention tremor: absent  Action tremor: absent    Reflexes   Right brachioradialis: 2+  Left brachioradialis: 2+  Right biceps: 2+  Left biceps: 2+  Right triceps: 2+  Left triceps: 2+  Right patellar: 2+  Left patellar: 2+  Right achilles: 2+  Left achilles: 2+  Right plantar: normal  Left plantar: normal  Left hip pain, Romberg equivocal.  Armswing was present but not as natural. Strides normal.          Assessment / Plan:    Maral Ray was seen today for follow-up. Diagnoses and all orders for this visit:    Sensory polyneuropathy    Memory difficulties    Chronic neck and back pain    Patient noticed slowness of thinking process and some difficulty of memory past 6 to 8-month, she lives alone and has had reduce her social activities since pandemic years. During this visit and neuro examination her cognitive function was in normal range. Check EEG here in the office, updated TSH and B12 level with her primary provider. On today's examination, her sensory deficit was very mild. Motor function was normal.  She was encouraged to exercise regularly at least 5 days a week including aerobic fast walking, muscle strengthening/toning and balance; urged her to return to normal social life. I have spent 45 min, greater than 50% of discussing and counseling with patient, for treatment and diagnostic plan review.

## 2022-06-15 ENCOUNTER — NURSE ONLY (OUTPATIENT)
Dept: NEUROLOGY | Age: 72
End: 2022-06-15
Payer: MEDICARE

## 2022-06-15 DIAGNOSIS — R41.82 ALTERED MENTAL STATUS, UNSPECIFIED ALTERED MENTAL STATUS TYPE: Primary | ICD-10-CM

## 2022-06-15 DIAGNOSIS — R41.3 MEMORY DIFFICULTIES: ICD-10-CM

## 2022-06-15 PROCEDURE — 95819 EEG AWAKE AND ASLEEP: CPT | Performed by: PSYCHIATRY & NEUROLOGY

## 2022-06-15 NOTE — PROGRESS NOTES
[address]                                                   ELECTROENCEPHALOGRAM REPORT                                                           [address] [facility]                                                          [phone]       DATE[de-identified]    6/15/2022        EEG Number:           Indication:    Altered  mental status/ memory changes       Current Outpatient Medications:     pregabalin (LYRICA) 75 MG capsule, Take 75 mg by mouth 2 times daily. , Disp: , Rfl:     aluminum & magnesium hydroxide-simethicone (MAALOX) 200-200-20 MG/5ML SUSP suspension, Take 15 mLs by mouth as needed, Disp: , Rfl:     docusate (COLACE, DULCOLAX) 100 MG CAPS, Take 300 mg by mouth, Disp: , Rfl:     hydrocortisone 2.5 % cream, INSERT CREAM INTO RECTUM FOUR TIMES DAILY AS DIRECTED, Disp: , Rfl:     hyoscyamine (OSCIMIN) 125 MCG TBDP dispersible tablet, PLACE 1 TABLET ON THE TONGUE AND ALLOW TO DISSOLVE TWICE DAILY AS NEEDED (Patient not taking: Reported on 6/6/2022), Disp: , Rfl:     Lifitegrast 5 % SOLN, PRN, Disp: , Rfl:     LORazepam (ATIVAN) 0.5 MG tablet, Take 0.5-1 tablets by mouth 2 times daily as needed. , Disp: , Rfl:     nortriptyline (PAMELOR) 10 MG capsule, TAKE 1 CAPSULE BY MOUTH IN THE EVENING WITH EVENING MEAL, Disp: , Rfl:     nystatin (MYCOSTATIN) 658253 UNIT/GM cream, Apply topically 2 times daily, Disp: , Rfl:     polyethylene glycol (GLYCOLAX) 17 GM/SCOOP powder, Take 17 g by mouth daily, Disp: , Rfl:     triamcinolone (KENALOG) 0.1 % cream, Apply topically 2 times daily, Disp: , Rfl:     zolpidem (AMBIEN) 10 MG tablet, Take 10 mg by mouth., Disp: , Rfl:       Technique: This EEG was performed using the [employer] 10/20 System. An EKG was monitored. The length of the recording was 30 minutes. Normal stage II sleep . Normal   awake. [place] and asleep. State of Consciousness:       10 / 20 IEP 21 channel Xltek equipment bipolar / referential recordings for 30 + minutes using standard montages and technique. Background:  Normal.       10 Hz. Rhythms:  Normal. Symmetric. Epileptiform:  None. Symmetry :  Normal.     Alpha:  8 hz = normal amount. Normal attenuation with eye opening. Beta:   13 + Hz and good amplitudes :  Normal amount. Normal symmetry. Theta:   5-7 Hz:  Normal amounts. Delta:   2 - 3 hz:   Normal amounts. No bizarre or unusual rhythms. O2 Sat[de-identified]    *** % average. EKG[de-identified]  52 BPM   Activation Procedures:  Hyperventlation:       Deferred due to age     [de-identified] Stimulation:        driving    Interpretation:            Normal awake to sleeping EEG. No seizure or event. No paroxysmal discharge. No abnormal focus.                        Maria D Jacob MD  Consultative Neurology, Neurodiagnostics   [facility]    [facility] Laurita FernandezLynsey [address] [place] [address]  Phone:  [phone]  Fax:   [phone]

## 2022-06-17 NOTE — PROGRESS NOTES
[address] [place] [address]. [address], [address] [facility]    Routine Electroencephalogram Report      DATE: Miri 15, 2022    EEG Number:     Indication:      Medications:   Current Outpatient Medications   Medication Sig Dispense Refill    pregabalin (LYRICA) 75 MG capsule Take 75 mg by mouth 2 times daily.  aluminum & magnesium hydroxide-simethicone (MAALOX) 200-200-20 MG/5ML SUSP suspension Take 15 mLs by mouth as needed      docusate (COLACE, DULCOLAX) 100 MG CAPS Take 300 mg by mouth      hydrocortisone 2.5 % cream INSERT CREAM INTO RECTUM FOUR TIMES DAILY AS DIRECTED      hyoscyamine (OSCIMIN) 125 MCG TBDP dispersible tablet PLACE 1 TABLET ON THE TONGUE AND ALLOW TO DISSOLVE TWICE DAILY AS NEEDED (Patient not taking: Reported on 6/6/2022)      Lifitegrast 5 % SOLN PRN      LORazepam (ATIVAN) 0.5 MG tablet Take 0.5-1 tablets by mouth 2 times daily as needed.  nortriptyline (PAMELOR) 10 MG capsule TAKE 1 CAPSULE BY MOUTH IN THE EVENING WITH EVENING MEAL      nystatin (MYCOSTATIN) 312185 UNIT/GM cream Apply topically 2 times daily      polyethylene glycol (GLYCOLAX) 17 GM/SCOOP powder Take 17 g by mouth daily      triamcinolone (KENALOG) 0.1 % cream Apply topically 2 times daily      zolpidem (AMBIEN) 10 MG tablet Take 10 mg by mouth. No current facility-administered medications for this visit. Technique: This EEG was performed using the [employer] 10/20 System. An EKG was monitored. The length of the recording was 30 minutes. State of Consciousness: awake, drowsy and asleep      Description:  Background showed normal alpha rhythm 9 Hz and symmetrical.  Beta 20 to 30 Hz, low-voltage, anterior regions, sometimes diffuse and symmetrical.  Adequate drowsy periods, non-REM sleep stage I with normal potentials.   Vertex sharp waves often extended to bilateral frontal parasagittal region, considered as normal variant. Activation Procedures:  Hyperventilation: Deferred due to age  Photic Stimulation: Symmetrical driving     EK/JGO, regular  Oxymetry: 93 to 98%      Interpretation: Normal electroencephalogram, awake, asleep and with activation procedure. There are no epileptiform discharges or lateralized pathological slowing activities. EKG monitoring shows mild bradycardia.   Oximetry is normal.      Donis Guan MD

## 2022-06-20 ENCOUNTER — TELEPHONE (OUTPATIENT)
Dept: NEUROLOGY | Age: 72
End: 2022-06-20

## 2022-06-20 NOTE — TELEPHONE ENCOUNTER
Interpretation: Normal electroencephalogram, awake, asleep and with activation procedure. There are no epileptiform discharges or lateralized pathological slowing activities. EKG monitoring shows mild bradycardia.   Oximetry is normal.        Ainka Trevizo MD

## 2022-06-22 ENCOUNTER — HOSPITAL ENCOUNTER (OUTPATIENT)
Dept: GENERAL RADIOLOGY | Age: 72
Discharge: HOME | End: 2022-06-25
Payer: MEDICARE

## 2022-06-22 DIAGNOSIS — M25.50 POLYARTHRALGIA: ICD-10-CM

## 2022-06-22 DIAGNOSIS — M25.552 PAIN, JOINT, HIP, LEFT: ICD-10-CM

## 2022-06-22 PROCEDURE — 73130 X-RAY EXAM OF HAND: CPT

## 2022-06-22 PROCEDURE — 73502 X-RAY EXAM HIP UNI 2-3 VIEWS: CPT

## 2022-07-18 ENCOUNTER — OFFICE VISIT (OUTPATIENT)
Dept: CARDIOLOGY CLINIC | Age: 72
End: 2022-07-18
Payer: MEDICARE

## 2022-07-18 VITALS
WEIGHT: 144.6 LBS | SYSTOLIC BLOOD PRESSURE: 110 MMHG | HEART RATE: 64 BPM | HEIGHT: 63 IN | DIASTOLIC BLOOD PRESSURE: 82 MMHG | BODY MASS INDEX: 25.62 KG/M2

## 2022-07-18 DIAGNOSIS — I25.10 CORONARY ARTERY DISEASE INVOLVING NATIVE CORONARY ARTERY OF NATIVE HEART WITHOUT ANGINA PECTORIS: ICD-10-CM

## 2022-07-18 DIAGNOSIS — I34.1 MVP (MITRAL VALVE PROLAPSE): Primary | ICD-10-CM

## 2022-07-18 DIAGNOSIS — I72.9 ANEURYSM (HCC): ICD-10-CM

## 2022-07-18 PROCEDURE — 1090F PRES/ABSN URINE INCON ASSESS: CPT | Performed by: INTERNAL MEDICINE

## 2022-07-18 PROCEDURE — 1123F ACP DISCUSS/DSCN MKR DOCD: CPT | Performed by: INTERNAL MEDICINE

## 2022-07-18 PROCEDURE — G8427 DOCREV CUR MEDS BY ELIG CLIN: HCPCS | Performed by: INTERNAL MEDICINE

## 2022-07-18 PROCEDURE — G8417 CALC BMI ABV UP PARAM F/U: HCPCS | Performed by: INTERNAL MEDICINE

## 2022-07-18 PROCEDURE — 99214 OFFICE O/P EST MOD 30 MIN: CPT | Performed by: INTERNAL MEDICINE

## 2022-07-18 PROCEDURE — 3017F COLORECTAL CA SCREEN DOC REV: CPT | Performed by: INTERNAL MEDICINE

## 2022-07-18 PROCEDURE — G8399 PT W/DXA RESULTS DOCUMENT: HCPCS | Performed by: INTERNAL MEDICINE

## 2022-07-18 PROCEDURE — 1036F TOBACCO NON-USER: CPT | Performed by: INTERNAL MEDICINE

## 2022-07-18 RX ORDER — TRAMADOL HYDROCHLORIDE 50 MG/1
TABLET ORAL
COMMUNITY
Start: 2022-06-27

## 2022-07-18 NOTE — PROGRESS NOTES
[address], [address]  PHONE: [phone]         NAME:  Carlos Landa  : 1950  MRN: [id number]       SUBJECTIVE:   Carlos Landa is a 71 y.o. female seen for a follow up visit regarding the following:     Chief Complaint   Patient presents with    Coronary Artery Disease       HPI:  Here for eval of CAD, Ca score of 101 in . [facility] Echo 2018: normal EF, mild to mod MR and MVP. NST 10/2021: Myocardial perfusion imaging supports a low risk stress test.   Echo 10/2021: normal EF, mild AI. Mild MR.        struggling with neuropathy, worsening. Some Gi issues. No CP, pressure. CP seems better now, GI issues now. No new angina. Patient denies recent history of orthopnea, PND, excessive dizziness and/or syncope. Past Medical History, Past Surgical History, Family history, Social History, and Medications were all reviewed with the patient today and updated as necessary. Current Outpatient Medications   Medication Sig Dispense Refill    Rosuvastatin Calcium (CRESTOR PO) Take by mouth      traMADol (ULTRAM) 50 MG tablet TAKE 1 TABLET BY MOUTH TWICE DAILY      pregabalin (LYRICA) 75 MG capsule Take 50 mg by mouth in the morning. aluminum & magnesium hydroxide-simethicone (MAALOX) 200-200-20 MG/5ML SUSP suspension Take 15 mLs by mouth as needed      hydrocortisone 2.5 % cream INSERT CREAM INTO RECTUM FOUR TIMES DAILY AS DIRECTED      hyoscyamine (OSCIMIN) 125 MCG TBDP dispersible tablet PLACE 1 TABLET ON THE TONGUE AND ALLOW TO DISSOLVE TWICE DAILY AS NEEDED      Lifitegrast 5 % SOLN PRN    xiidra      LORazepam (ATIVAN) 0.5 MG tablet Take 0.5-1 tablets by mouth 2 times daily as needed.       nystatin (MYCOSTATIN) 424637 UNIT/GM cream Apply topically 2 times daily      polyethylene glycol (GLYCOLAX) 17 GM/SCOOP powder Take 17 g by mouth as needed      triamcinolone (KENALOG) 0.1 % cream Apply topically 2 times daily      zolpidem for what was outlined in the HPI today. PHYSICAL EXAM:     /82   Pulse 64   Ht 5' 3\" (1.6 m)   Wt 144 lb 9.6 oz (65.6 kg)   BMI 25.61 kg/m²    General/Constitutional:   Alert and oriented x 3, no acute distress  HEENT:   normocephalic, atraumatic, moist mucous membranes  Neck:   No JVD or carotid bruits bilaterally  Cardiovascular:   regular rate and rhythm, no murmur/rub/gallop appreciated  Pulmonary:   clear to auscultation bilaterally, no respiratory distress  Abdomen:   soft, non-tender, non-distended  Ext:   No sig LE edema bilaterally  Skin:  warm and dry, no obvious rashes seen  Neuro:   no obvious sensory or motor deficits  Psychiatric:   normal mood and affect      Lab Results   Component Value Date/Time     08/26/2020 10:01 AM    K 4.3 08/26/2020 10:01 AM    CL 97 08/26/2020 10:01 AM    CO2 26 08/26/2020 10:01 AM    BUN 12 08/26/2020 10:01 AM    CREATININE 0.58 12/03/2021 03:18 PM    CREATININE 0.76 08/26/2020 10:01 AM    GLUCOSE 78 08/26/2020 10:01 AM    CALCIUM 9.9 08/26/2020 10:01 AM        Lab Results   Component Value Date    WBC 4.1 08/26/2020    HGB 13.0 08/26/2020    HCT 39.5 08/26/2020    MCV 95 08/26/2020     08/26/2020       Lab Results   Component Value Date    TSH 2.640 08/26/2020       Lab Results   Component Value Date    LABA1C 5.4 11/18/2019     Lab Results   Component Value Date     11/18/2019       Lab Results   Component Value Date    CHOL 255 (H) 08/26/2020     Lab Results   Component Value Date    TRIG 90 08/26/2020     Lab Results   Component Value Date    HDL 83 08/26/2020     Lab Results   Component Value Date    LDLCALC 154 (H) 08/26/2020     Lab Results   Component Value Date    LABVLDL 18 08/26/2020     No results found for: CHOLHDLRATIO        I have Independently reviewed prior care notes, any ER records available, cardiac testing, labs and results with the patient and before seeing the patient today.   Also independently reviewed outside

## 2022-07-20 NOTE — PROGRESS NOTES
Chance Dixon  is a 71 y.o. No obstetric history on file. who is here for an annual exam.  Complaints:She is dealing with a great deal of stress due to a recent divorce. Mammogram:22 scheduled   Colonoscopy:  Dexa:  Pap Smear: 20 Negative         History  No flowsheet data found. Past Medical History:   Diagnosis Date    Acne rosacea     Agatston CAC score 100-199     Cervical spondylosis     Chronic insomnia     Chronic thoracic spine pain     Colon polyps     Depression, major, single episode, mild (HCC)     Dyslipidemia     GERD (gastroesophageal reflux disease)     EGD in 2019 showed small duodenal ulcers and mild distal esophagitis. EGD in April showed everything had healed    Low back pain with bilateral sciatica 2020    Recent episode    Osteoarthritis, generalized     Sensory neuropathy 10/27/2020    Sensory polyneuropathy 2019    Sigmoid diverticulosis     Small intestinal bacterial overgrowth      Past Surgical History:   Procedure Laterality Date     SECTION  1980    x 2    COLONOSCOPY  2016    diverticulosis of large colon    UPPER GASTROINTESTINAL ENDOSCOPY  ,     No barretts seen on last 2 endoscopies     Current Outpatient Medications on File Prior to Visit   Medication Sig Dispense Refill    zolpidem (AMBIEN) 5 MG tablet TAKE 1 TABLET BY MOUTH AT BEDTIME      Rosuvastatin Calcium (CRESTOR PO) Take by mouth      traMADol (ULTRAM) 50 MG tablet TAKE 1 TABLET BY MOUTH TWICE DAILY      pregabalin (LYRICA) 75 MG capsule Take 50 mg by mouth in the morning. aluminum & magnesium hydroxide-simethicone (MAALOX) 200-200-20 MG/5ML SUSP suspension Take 15 mLs by mouth as needed      docusate (COLACE, DULCOLAX) 100 MG CAPS Take 300 mg by mouth      hydrocortisone 2.5 % cream INSERT CREAM INTO RECTUM FOUR TIMES DAILY AS DIRECTED      Lifitegrast 5 % SOLN PRN    xiidra      LORazepam (ATIVAN) 0.5 MG tablet Take 0.5-1 tablets by mouth 2 times daily as needed. polyethylene glycol (GLYCOLAX) 17 GM/SCOOP powder Take 17 g by mouth as needed       No current facility-administered medications on file prior to visit. Allergies   Allergen Reactions    Duloxetine Other (See Comments)     \"something going on with head'    Duloxetine Hcl Other (See Comments)    Lactose Other (See Comments)    Sucralose Other (See Comments)    Gabapentin Rash    Nickel Rash     Social History     Tobacco Use    Smoking status: Never    Smokeless tobacco: Never   Substance Use Topics    Alcohol use: Yes     Alcohol/week: 0.0 standard drinks     Family History   Problem Relation Age of Onset    Breast Cancer Maternal Grandmother 54           Review of Systems  All ROS negative except what's noted in HPI    Constitutional:  Denies weight gain, unexplained weight loss or heat or cold intolerance  ENT: Denies change in vision, change in hearing, frequent headaches  Cardiovascular:  Denies chest pain, swelling in legs or feet, shortness of breath when lying flat  Respiratory:  Denies shortness of breath, cough greater than 2 weeks or coughing up blood  Gastro: Denies diarrhea greater than 2 weeks, rectal bleeding, bloody stools, heartburn, or constipation  :  Denies blood in urine, getting up more than twice at night to urinate, dysuria or incontinence  Breast:  Denies nipple discharge, masses or pain  Skin:  Denies rash greater than 2 weeks, change in moles  Musculoskeletal/Neuro:  Denies joint pain, muscle weakness, seizures, loss of balance or frequent falls  Psych:  Denies frequent crying spells or severe anxiety  Heme:  Denies easy bruising, bleeding gums, frequent nosebleeds or swollen lymph nodes  GYN:  Denies bleeding or spotting between menses, heavy menses, menses longer than 7 days, pain with sex, severe menstrual cramps. Social:  Feels safe in her home.   Denies ever having been threatened or hit by a family member                  Physical Exam  Blood pressure 118/78, height 5' 3\" (1.6 m), weight 140 lb (63.5 kg). Body mass index is 24.8 kg/m². Lab Results   Component Value Date/Time    HGB 13.0 08/26/2020 10:01 AM      @LASTPROCAMB([id number])@  @LASTPROCAMB([id number])@    [facility] unremarkable. EOMI. DOROTEO. Sclera non-icteric. Neck is supple without thyromegaly or nodes. Chest clear to auscultation. Bilateral breath sounds equal.    Heart regular rate and rhythm with no murmur, rub or gallop. Breast exam reveals no masses or nipple discharge. No axillary notes are palpable. Abdomen is benign without organomegally. BUS is normal.    Cervix is  present. Pap smear was performed. Bimanual exam reveals no masses. Assessment  71 y.o. No obstetric history on file. for annual exam.  Encounter Diagnoses   Name Primary?     Encounter for well woman exam with routine gynecological exam Yes    Routine cervical smear     Vaginal itching        Plan  Rebecca Hernandez was seen today for gynecologic exam.    Diagnoses and all orders for this visit:    Encounter for well woman exam with routine gynecological exam  -     NE CA SCREEN;PELVIC/BREAST EXAM  -     NE OBTAINING SCREEN PAP SMEAR    Routine cervical smear  -     PAP LB, Reflex HPV ASCUS    Vaginal itching  -     nystatin (MYCOSTATIN) 133329 UNIT/GM cream; Apply topically 2 times daily  -     triamcinolone (KENALOG) 0.1 % cream; Apply topically 2 times daily      mammogram ordered; importance emphasized  pap smear done  return annually or prn  medications as per orders

## 2022-07-21 ENCOUNTER — OFFICE VISIT (OUTPATIENT)
Dept: GYNECOLOGY | Age: 72
End: 2022-07-21
Payer: MEDICARE

## 2022-07-21 VITALS
SYSTOLIC BLOOD PRESSURE: 118 MMHG | BODY MASS INDEX: 24.8 KG/M2 | WEIGHT: 140 LBS | DIASTOLIC BLOOD PRESSURE: 78 MMHG | HEIGHT: 63 IN

## 2022-07-21 DIAGNOSIS — Z01.419 ENCOUNTER FOR WELL WOMAN EXAM WITH ROUTINE GYNECOLOGICAL EXAM: Primary | ICD-10-CM

## 2022-07-21 DIAGNOSIS — N89.8 VAGINAL ITCHING: ICD-10-CM

## 2022-07-21 DIAGNOSIS — Z12.4 ROUTINE CERVICAL SMEAR: ICD-10-CM

## 2022-07-21 PROCEDURE — G8428 CUR MEDS NOT DOCUMENT: HCPCS | Performed by: OBSTETRICS & GYNECOLOGY

## 2022-07-21 PROCEDURE — G0101 CA SCREEN;PELVIC/BREAST EXAM: HCPCS | Performed by: OBSTETRICS & GYNECOLOGY

## 2022-07-21 PROCEDURE — G8420 CALC BMI NORM PARAMETERS: HCPCS | Performed by: OBSTETRICS & GYNECOLOGY

## 2022-07-21 RX ORDER — ZOLPIDEM TARTRATE 5 MG/1
TABLET ORAL
COMMUNITY
Start: 2022-07-09

## 2022-07-21 RX ORDER — NYSTATIN 100000 U/G
CREAM TOPICAL 2 TIMES DAILY
Qty: 30 G | Refills: 5 | Status: SHIPPED | OUTPATIENT
Start: 2022-07-21

## 2022-07-21 RX ORDER — TRIAMCINOLONE ACETONIDE 1 MG/G
CREAM TOPICAL 2 TIMES DAILY
Qty: 45 G | Refills: 5 | Status: SHIPPED | OUTPATIENT
Start: 2022-07-21

## 2022-07-23 ENCOUNTER — HOSPITAL ENCOUNTER (OUTPATIENT)
Dept: MAMMOGRAPHY | Age: 72
Discharge: HOME | End: 2022-07-26
Payer: MEDICARE

## 2022-07-23 DIAGNOSIS — Z12.31 ENCOUNTER FOR SCREENING MAMMOGRAM FOR MALIGNANT NEOPLASM OF BREAST: ICD-10-CM

## 2022-07-23 PROCEDURE — 77067 SCR MAMMO BI INCL CAD: CPT

## 2022-07-25 LAB
CYTOLOGIST CVX/VAG CYTO: NORMAL
CYTOLOGY CVX/VAG DOC THIN PREP: NORMAL
HPV REFLEX: NORMAL
Lab: NORMAL
Lab: NORMAL
PATH REPORT.FINAL DX SPEC: NORMAL
STAT OF ADQ CVX/VAG CYTO-IMP: NORMAL

## 2022-07-27 ENCOUNTER — HOSPITAL ENCOUNTER (OUTPATIENT)
Dept: MAMMOGRAPHY | Age: 72
Discharge: HOME | End: 2022-07-30
Payer: MEDICARE

## 2022-07-27 ENCOUNTER — TELEPHONE (OUTPATIENT)
Dept: GYNECOLOGY | Age: 72
End: 2022-07-27

## 2022-07-27 ENCOUNTER — APPOINTMENT (OUTPATIENT)
Dept: MAMMOGRAPHY | Age: 72
End: 2022-07-27
Payer: MEDICARE

## 2022-07-27 DIAGNOSIS — R92.8 ABNORMAL MAMMOGRAM: ICD-10-CM

## 2022-07-27 DIAGNOSIS — N63.10 BREAST MASS, RIGHT: ICD-10-CM

## 2022-07-27 DIAGNOSIS — R92.8 ABNORMAL SCREENING MAMMOGRAM: ICD-10-CM

## 2022-07-27 DIAGNOSIS — N64.9 DISORDER OF BREAST: Primary | ICD-10-CM

## 2022-07-27 PROCEDURE — 77065 DX MAMMO INCL CAD UNI: CPT

## 2022-07-27 PROCEDURE — 76642 ULTRASOUND BREAST LIMITED: CPT

## 2022-08-27 NOTE — PROGRESS NOTES
Joy Pemberton  : 1950  [place] / [place] / Product Type: Medicare /    [facility] [address]. [facility], [address], [facility], [address] [place] [address] Road  [phone]   [phone]          OUTPATIENT PHYSICAL THERAPY:Daily Note 2018     ICD-10: Treatment Diagnosis: Pain in Left Shoulder (M25.512) Cervicalgia (M54.2)  Abnormal posture (R29.3)  thoracic pain     Precautions/Allergies:   Nickel   Fall Risk Score: 1 (? 5 = High Risk)  MD Orders: evaluate and treat MEDICAL/REFERRING DIAGNOSIS:  Chronic neck pain  DATE OF ONSET: 6 months   REFERRING PHYSICIAN: Hever Brandt MD  RETURN PHYSICIAN APPOINTMENT: not specified      INITIAL ASSESSMENT:  Ms. Maximo Robertson presents with functional limitations due to  Neck and mid back pain. PT evaluation reveals significant intervertebral joint stiffness, muscle imbalances and abnormal postural alinement. Pt responded well after manual techniques today and will highly benefit from skilled PT to address problems below and improve quality of life. PROBLEM LIST (Impacting functional limitations):  1. Decreased Strength  2. Increased Pain  3. Decreased Flexibility/Joint Mobility  4. Decreased Knowledge of Precautions  5. Decreased [place] with Home Exercise Program INTERVENTIONS PLANNED:  1. Cold  2. Heat  3. Home Exercise Program (HEP)  4. Manual Therapy  5. Neuromuscular Re-education/Strengthening  6. Range of Motion (ROM)  7. Therapeutic Exercise/Strengthening     TREATMENT PLAN:  Effective Dates: 10/29/2018 TO 2019 (90 days). Frequency/Duration: 2 times a week for 90 Days  GOALS: (Goals have been discussed and agreed upon with patient.)  SHORT-TERM FUNCTIONAL GOALS: Time Frame: 2-4 weeks   1. Pt will be independent with HEP focusing on core stability and promoting good spinal alignment. 2. Pt will report no symptoms of UE for 1-2 weeks demonstrating centralization of symptoms.   3. Pt Pt comes to set up a colonoscopy. last one in [place] about 10 years ago. no polyps were removed. no FH of colon CA or polyps. will report pain level does not exceed 5/10 in a 1-2 week period of time to demonstrate pain management. DISCHARGE GOALS: Time Frame: 6-8 weeks   1. Pt will improve NDI score by at least 5 points   2. Pt will demonstrate pain free cervical rotation B without report of pain to improve functional mobility. 3. Pt will be able to sustain regular exercise routine including aerobic exercise 3+ times per week without increased symptoms. Rehabilitation Potential For Stated Goals: Good              The information in this section was collected on 10/29/18 (except where otherwise noted). HISTORY:   History of Present Injury/Illness (Reason for Referral):  Chronic mid thoracic pain for years (mostly right side) most recent MRI t-spine negative for abnormalities (per pt report). MD suggested chiropractor-- receiving weekly chiro adjustments with some thoracic pain relief. Pt states that 6 months ago, left sided neck pain onset. X-rays of cervical spine at chiropractor office showing mild OA, per patient. Chiropractor suggested muscular in nature and recommended PT due to no change in symptoms. Occasionally pain also radiates to left shoulder. Pt states that, 2 weeks ago, heard pop and pain intensified since then. Present symptoms (on day of initial evaluation): constant aching and sharp pain of left sided neck occasionally radiating into arm ; aching sharp pain of central mid thoracic   · Aggravating factors: PM worse, driving, turning head to right. · Relieving factors: laying down, ice, support   · Pain level: 2/10 presently, 8/10 worst, 1-2/10 best     Past Medical History/Comorbidities:   Ms. George Duane  has a past medical history of Acne rosacea, Chronic insomnia, Chronic thoracic spine pain, Colon polyps, Coronary Calcium Score 101, Depression, major, single episode, mild ([address] Dyslipidemia, GERD (gastroesophageal reflux disease), Osteoarthritis, generalized, and Sigmoid diverticulosis.   Ms. Marbin Hicks  has a past surgical history that includes hx  section (); hx colonoscopy (2016); and hx endoscopy (, ). Social History/Living Environment:     lives with  in two story home      Prior Level of Function/Work/Activity:  , driving, reaching, lifting moderately heavy boxes,     Dominant Side:         RIGHT  Other Clinical Tests:          MRI thoracic, x-rays   Previous Treatment Approaches:          Chiropractic   Current Medications:       Current Outpatient Medications:     cephALEXin (KEFLEX) 500 mg capsule, Take 1 Cap by mouth four (4) times daily. , Disp: 40 Cap, Rfl: 0    zolpidem (AMBIEN) 10 mg tablet, Take 1 Tab by mouth nightly as needed for Sleep. Max Daily Amount: 10 mg., Disp: 30 Tab, Rfl: 3    fluconazole (DIFLUCAN) 150 mg tablet, Take 1 tablet po today then repeat in four days, Disp: 2 Tab, Rfl: 0    nystatin (MYCOSTATIN) topical cream, Apply  to affected area two (2) times a day., Disp: 15 g, Rfl: 0    ESTRACE 0.01 % (0.1 mg/gram) vaginal cream, Use 1 g vaginally 2-3 nights per week  Brand only please, Disp: 1 Tube, Rfl: 3    naproxen sodium (ALEVE) 220 mg tablet, Take 220 mg by mouth two (2) times daily as needed. , Disp: , Rfl:     lamoTRIgine (LAMICTAL) 25 mg tablet, Take 25 mg by mouth two (2) times a day., Disp: , Rfl:     aspirin delayed-release 81 mg tablet, Take  by mouth daily. , Disp: , Rfl:     melatonin tab tablet, Take  by mouth nightly., Disp: , Rfl:     hydrocortisone (PROCTOSOL HC) 2.5 % rectal cream, INSERT CREAM INTO RECTUM FOUR TIMES DAILY AS DIRECTED, Disp: 1 Tube, Rfl: 5    rosuvastatin (CRESTOR) 5 mg tablet, Take 1 Tab by mouth nightly. [facility] Fax - [phone]  , Disp: 90 Tab, Rfl: 3    raNITIdine (ZANTAC) 150 mg tablet, Take 150 mg by mouth., Disp: , Rfl:     metroNIDAZOLE (METROCREAM) 0.75 % topical cream, Apply  to affected area two (2) times a day.  Use a thin layer to affected areas after washing, Disp: 45 g, Rfl: 5    multivitamin (ONE A DAY) tablet, Take 1 Tab by mouth daily. , Disp: , Rfl:    Date Last Reviewed:  11/28/2018     EXAMINATION:   Observation/Orthostatic Postural Assessment:         Rounded shoulders, forward head, increased kyphosis noted of thoracic spine        Increased tone of left upper trap, compared to right    Palpation:      Increased tone and tenderness of left upper trap         ROM:     Eval Date: 10/29/18 Re-Assess date:   Joint:     Active ROM     Cervical Extension 50% cs    Cervical Flexion 75%    Cervical Sidebending R: 50% cs  L: 75%    Cervical Rotation R: 50% cs  L: 75%    PAIVMs  C7 stiff with SP left  mobilization     Shoulder Flexion L: mild limitation   R: WNL    Shoulder Abduction L: mild limitation   R: mild limitation          Thoracic Flexion 100%    Thoracic Extension 25%        Strength:    · B UE 4+/5    Neurological Screen:  Intact     Functional Mobility:         Gait/Ambulation:  Independent and safe        Transfers:  independent and safe      Outcome Measure: Tool Used: Neck Disability Index (NDI)  Score:  Initial: 15/50  Most Recent: X/50 (Date: -- )   Interpretation of Score: The Neck Disability Index is a revised form of the Oswestry Low Back Pain Index and is designed to measure the activities of daily living in person's with neck pain. Each section is scored on a 0-5 scale, 5 representing the greatest disability. The scores of each section are added together for a total score of 50. Score 0 1-10 11-20 21-30 31-40 41-49 50   Modifier CH CI CJ CK CL CM CN     ?  Changing and Maintaining Body Position:     - CURRENT STATUS: CJ - 20%-39% impaired, limited or restricted    - GOAL STATUS: CI - 1%-19% impaired, limited or restricted    - D/C STATUS:  ---------------To be determined---------------    Medical Necessity:   · Patient is expected to demonstrate progress in strength and range of motion to increase independence with ADLs and recreational activities . Reason for Services/Other Comments:  · Patient continues to require modification of therapeutic interventions to increase complexity of exercises. TREATMENT:   (In addition to Assessment/Re-Assessment sessions the following treatments were rendered)  Pre-treatment Symptoms/Complaints: pt states that she did very well after last treatment, good relief of upper trap pain. Yesterday was sitting for a while at a doctors office and has had right thoracic pain since. This AM felt no pain when woke up, since performing exercises however, mid back pain continues. Pain: Initial:     2/10 Post Session:  0/10     [employer]    THERAPEUTIC EXERCISE: ( 10 minutes):  Exercises per grid below to improve mobility and strength. Required moderate visual, verbal and manual cues to promote proper body alignment, promote proper body posture and promote proper body mechanics. Progressed resistance, range and repetitions as indicated. Date:  10/29/18 Date:  10/31/18 Date:  11/5/18 11/12/18 11/14 11/19/18 11/26/18 11/28   Activity/Exercise Parameters Parameters Parameters        Education  POC  cause of scalene strain related to stress breathing      Discussed ginger of t-band anchor for HEP   AROM cervical all planes   NV NV X 3, 10 sec all X 3, 10 sec all  X 3, 10 sec      AROM thoracic all planes   NV Flex/ext x 10 with guidance  X 5 F/E       Diaphragmatic breathing   X 5 min X 10 min  X 5 min During neck traction   During session     UBE   Level 1x 3/3  Level 3, 3/3 Level 3, 3/3  nustep level 6 x 10 min  3/3 level 3  3/3 level 3    Chin tuck     NV      Chin tuck head lift      NV      B shoulder ER     NV  Yellow band supine x 10      Rows           Extension       Red x 10, 5 sec      Supine B shoulder flexion        With breathing x 10     Access Code: [email or web address]   URL: [email or web address]   Date: 11/05/2018   Prepared by: Anish Whitney      Manual Therapy Interventions: ( 40 minutes): Manual interventions performed to improve joint/soft tissue mobility and ROM to improve ability to perform ADLs. Patient responded well to all manual interventions with no significant increase in pain/symptoms. Improved pain reported below. Technique Used Grade  Level # Time(s) Effect while being performed   CPA's   lower cervical and all thoracic      STM  Trigger points of B rhomboids, B upper trap and  left scalenes    Decreased tone and pain    Cervical traction   Mid-lower cerv  Decreased stiffness    DTM  Left upper trap and left scalenes   Decreased tone, increased movement    Manual muscle lengthening  Static stretch B Upper trap and left levator  3 x 10 sec  Increased ROM cervical     chin tuck     Supine 5,x 5 sec    Modalities: 10 min   · Ice application cerv and thoracic region post treatment    Treatment/Session Assessment:  Less upper trap tone than last session. triggerpoint noted and reduced of right rhomboid region. · Response to Treatment:   Pt states significant less thoracic and cervical pain at end of session. · Compliance with Program/Exercises: Will assess as treatment progresses.   · Recommendations/Intent for next treatment session:     Future Appointments   Date Time Provider Mita Dumont   12/3/2018  1:00 PM Kiesha Paula PT [facility]   12/4/2018 10:45 AM Gia Cespedes MD [facility]   12/6/2018 10:30 AM Kiesha Paula PT [facility]   12/10/2018 11:15 AM [facility]   12/13/2018 10:15 [facility]   12/14/2018 11:00 AM Lamarr Lanes, MD [facility]   4/3/2019 10:20 AM Gia Marroquin MD [facility]       Total Treatment Duration: 60 min   PT Patient Time In/Time Out  Time In: 3108  Time Out: [address] [facility]

## 2022-09-21 ENCOUNTER — APPOINTMENT (RX ONLY)
Dept: URBAN - METROPOLITAN AREA CLINIC 329 | Facility: CLINIC | Age: 72
Setting detail: DERMATOLOGY
End: 2022-09-21

## 2022-09-21 DIAGNOSIS — D18.0 HEMANGIOMA: ICD-10-CM | Status: STABLE

## 2022-09-21 DIAGNOSIS — L81.4 OTHER MELANIN HYPERPIGMENTATION: ICD-10-CM

## 2022-09-21 DIAGNOSIS — D22 MELANOCYTIC NEVI: ICD-10-CM | Status: STABLE

## 2022-09-21 DIAGNOSIS — Z85.828 PERSONAL HISTORY OF OTHER MALIGNANT NEOPLASM OF SKIN: ICD-10-CM | Status: STABLE

## 2022-09-21 DIAGNOSIS — L82.1 OTHER SEBORRHEIC KERATOSIS: ICD-10-CM | Status: STABLE

## 2022-09-21 PROBLEM — D22.5 MELANOCYTIC NEVI OF TRUNK: Status: ACTIVE | Noted: 2022-09-21

## 2022-09-21 PROBLEM — D18.01 HEMANGIOMA OF SKIN AND SUBCUTANEOUS TISSUE: Status: ACTIVE | Noted: 2022-09-21

## 2022-09-21 PROCEDURE — ? SUNSCREEN RECOMMENDATIONS

## 2022-09-21 PROCEDURE — 99203 OFFICE O/P NEW LOW 30 MIN: CPT

## 2022-09-21 PROCEDURE — ? COUNSELING

## 2022-09-21 PROCEDURE — ? ADDITIONAL NOTES

## 2022-09-21 PROCEDURE — ? FULL BODY SKIN EXAM

## 2022-09-21 ASSESSMENT — LOCATION ZONE DERM
LOCATION ZONE: FACE
LOCATION ZONE: ARM
LOCATION ZONE: LEG
LOCATION ZONE: LEG
LOCATION ZONE: TRUNK

## 2022-09-21 ASSESSMENT — LOCATION SIMPLE DESCRIPTION DERM
LOCATION SIMPLE: LEFT UPPER BACK
LOCATION SIMPLE: RIGHT CHEEK
LOCATION SIMPLE: LEFT BREAST
LOCATION SIMPLE: LEFT THIGH
LOCATION SIMPLE: LEFT FOREARM
LOCATION SIMPLE: LEFT THIGH
LOCATION SIMPLE: ABDOMEN

## 2022-09-21 ASSESSMENT — LOCATION DETAILED DESCRIPTION DERM
LOCATION DETAILED: LEFT ANTERIOR PROXIMAL THIGH
LOCATION DETAILED: LEFT ANTERIOR PROXIMAL THIGH
LOCATION DETAILED: LEFT MEDIAL UPPER BACK
LOCATION DETAILED: RIGHT RIB CAGE
LOCATION DETAILED: RIGHT SUPERIOR CENTRAL BUCCAL CHEEK
LOCATION DETAILED: LEFT PROXIMAL DORSAL FOREARM
LOCATION DETAILED: LEFT INFRAMAMMARY CREASE (INNER QUADRANT)

## 2022-09-21 NOTE — PROCEDURE: MIPS QUALITY
Quality 111:Pneumonia Vaccination Status For Older Adults: Pneumococcal vaccine (PPSV23) administered on or after patient’s 60th birthday and before the end of the measurement period
Quality 226: Preventive Care And Screening: Tobacco Use: Screening And Cessation Intervention: Patient screened for tobacco use and is an ex/non-smoker
Detail Level: Detailed
Quality 130: Documentation Of Current Medications In The Medical Record: Current Medications Documented
Quality 431: Preventive Care And Screening: Unhealthy Alcohol Use - Screening: Patient not identified as an unhealthy alcohol user when screened for unhealthy alcohol use using a systematic screening method
Quality 110: Preventive Care And Screening: Influenza Immunization: Influenza Immunization previously received during influenza season

## 2022-10-25 ENCOUNTER — OFFICE VISIT (OUTPATIENT)
Dept: UROGYNECOLOGY | Age: 72
End: 2022-10-25
Payer: MEDICARE

## 2022-10-25 VITALS — WEIGHT: 144 LBS | BODY MASS INDEX: 25.51 KG/M2

## 2022-10-25 DIAGNOSIS — R39.9 URINARY SYMPTOM OR SIGN: ICD-10-CM

## 2022-10-25 DIAGNOSIS — M62.89 PELVIC FLOOR DYSFUNCTION: Primary | ICD-10-CM

## 2022-10-25 LAB
BILIRUBIN, URINE, POC: NEGATIVE
BLOOD URINE, POC: NEGATIVE
GLUCOSE URINE, POC: NEGATIVE
KETONES, URINE, POC: NEGATIVE
LEUKOCYTE ESTERASE, URINE, POC: NEGATIVE
NITRITE, URINE, POC: NORMAL
PH, URINE, POC: 6.5 (ref 4.6–8)
PROTEIN,URINE, POC: NEGATIVE
SPECIFIC GRAVITY, URINE, POC: 1.01 (ref 1–1.03)
URINALYSIS CLARITY, POC: CLEAR
URINALYSIS COLOR, POC: YELLOW
UROBILINOGEN, POC: NORMAL

## 2022-10-25 PROCEDURE — 3017F COLORECTAL CA SCREEN DOC REV: CPT | Performed by: OBSTETRICS & GYNECOLOGY

## 2022-10-25 PROCEDURE — 1090F PRES/ABSN URINE INCON ASSESS: CPT | Performed by: OBSTETRICS & GYNECOLOGY

## 2022-10-25 PROCEDURE — G8417 CALC BMI ABV UP PARAM F/U: HCPCS | Performed by: OBSTETRICS & GYNECOLOGY

## 2022-10-25 PROCEDURE — 1123F ACP DISCUSS/DSCN MKR DOCD: CPT | Performed by: OBSTETRICS & GYNECOLOGY

## 2022-10-25 PROCEDURE — G8484 FLU IMMUNIZE NO ADMIN: HCPCS | Performed by: OBSTETRICS & GYNECOLOGY

## 2022-10-25 PROCEDURE — 1036F TOBACCO NON-USER: CPT | Performed by: OBSTETRICS & GYNECOLOGY

## 2022-10-25 PROCEDURE — G8427 DOCREV CUR MEDS BY ELIG CLIN: HCPCS | Performed by: OBSTETRICS & GYNECOLOGY

## 2022-10-25 PROCEDURE — G8399 PT W/DXA RESULTS DOCUMENT: HCPCS | Performed by: OBSTETRICS & GYNECOLOGY

## 2022-10-25 PROCEDURE — 99204 OFFICE O/P NEW MOD 45 MIN: CPT | Performed by: OBSTETRICS & GYNECOLOGY

## 2022-10-25 PROCEDURE — 51701 INSERT BLADDER CATHETER: CPT | Performed by: OBSTETRICS & GYNECOLOGY

## 2022-10-25 PROCEDURE — 81002 URINALYSIS NONAUTO W/O SCOPE: CPT | Performed by: OBSTETRICS & GYNECOLOGY

## 2022-10-25 NOTE — PROGRESS NOTES
[facility] UROGYNECOLOGY  [address]  Dept: [phone]        PCP:  TESS Woodard CNP    10/25/2022        HPI:  I am being asked to see this patient in consultation by Dr. Fran Downey   for Other (Abnormal Urination)  . Ms. Tamiko Novoa has been experiencing an abnormal urinary stream for about 1 year+. She states when she voids and stops, she will move and more urine will come out or more urine will come out if she sits there longer. \"I didn't do this when I was young, so I want to make sure nothing is wrong. \" She also feels like it's very hard to get her urine stream started. Pt also c/o having a very hard time to get her Bowel Movements to start- she takes Miralax, Maalox. Stopped taking Colace because she doesn't like that it takes water out of her bowel. She has to strain always. Ms. Tamiko Novoa denies pain, pressure, dysuria, and leaking or urine. She voids 12 times during the day. She voids 0 times over night. She has 10-14 BM per week, and does strain always. This is a big problem for her. She drinks 0 caffeine drinks beverages per day. She uses 0-5 artificial sweeteners per day. Mints, other sweeteners. She drinks 0 alcoholic beverages per week. She has not had pelvic surgery in the past. Cesareans. Her last PAP: 1 year ago  Her last Colonoscopy: 3 years ago  Her last Mammogram: 2022    She does not have a history of DM. Lab Results   Component Value Date    LABA1C 5.4 11/18/2019     Lab Results   Component Value Date     11/18/2019       She does not have a history of sleep apnea. Tobacco: No    Sexual History: not sexually active. Notes were reviewed from the referring provider Dr Fran Downey. No results found for this visit on 10/25/22. Wt 144 lb (65.3 kg)   BMI 25.51 kg/m²     PVR by straight catheterization: 85cc    Physical Exam  Vitals reviewed. Exam conducted with a chaperone present. Constitutional:       General: She is not in acute distress. Appearance: Normal appearance. She is normal weight. HENT:      Head: Normocephalic and atraumatic. Pulmonary:      Effort: Pulmonary effort is normal. No respiratory distress. Abdominal:      General: There is no distension. Palpations: There is no mass. Tenderness: There is no abdominal tenderness. There is no guarding or rebound. Hernia: No hernia is present. Musculoskeletal:      Cervical back: Normal range of motion. Skin:     General: Skin is warm and dry. Neurological:      Mental Status: She is alert and oriented to person, place, and time. Psychiatric:         Mood and Affect: Mood normal.         Behavior: Behavior normal.         Thought Content: Thought content normal.         Judgment: Judgment normal.        Female Genitourinary   Vulva:    Normal. No lesions  Bartholin's Gland:  Bilateral , Normal, nontender  Skenes Gland:  Bilateral, Normal, nontender   Clitoris:  Normal.   Introitus:    Normal.   Urethral Meatus:  Normal appearing, normal size, no lesions, no prolapse  Urethra:  No masses, no tenderness  Vagina:  No atrophy, no discharge, no lesions  Cervix:  No lesions, no discharge  Uterus:  No tenderness, normal mobility   Adnexa:   No masses palpated, no tenderness  Bladder:  No tenderness, no masses palpated  Perineum:  Normal, no lesions    Rectal   Anorectal Exam: No hemorrhoids and no masses or lesions of the perineum      POP-Q: (Pelvic Organ Prolapse - Quantification Exam):  No flowsheet data found. No POP      Pelvic floor muscles: Tender Spasm     R. Puborectalis: NO 0 /5    L. Puborectalis: NO 0 /5    R. Pubococcyg NO 0 /5    L. Pubococcyg NO 0 /5    R. Ileococcyg: NO 0 /5    L. Ileococcyg: NO 0 /5    R. Obturator Int: NO 0 /5    L. Obturator Int: NO 0 /5    R. Coccygeus: NO 0 /5    L.  Coccygeus: NO 0 /5      Pelvic floor contractions: 3/5    Supine Stress Test of SAHIL: Negative    Neurological Exam:   Sensorineural Exam:    Bulvocavernosus reflex:  Normal   Anal Armstrong:  Normal      1. Pelvic floor dysfunction  Assessment & Plan:  We discussed the purpose of physical therapy which is to strengthen the pelvic floor muscles and teach proper coordination of those muscles. I described the anatomy of those muscles involved and their relationship to the end-organs in the pelvis. I described therapy techniques which include a combination of therapeutic exercise, biofeedback, neuromuscular re-education, home programs, and electrical stimulation, as well as therapeutic massage and ultrasound for pain. Orders:  -     AMB POC URINALYSIS DIP STICK MANUAL W/O MICRO  -     INSERT,NON-INDWELLING BLADDER CATHETER  -     Ambulatory referral to Physical Therapy     No follow-ups on file.             Eugene Keene,

## 2022-10-28 LAB
BACTERIA SPEC CULT: NORMAL
SERVICE CMNT-IMP: NORMAL

## 2022-10-30 NOTE — RESULT ENCOUNTER NOTE
Please call [facility] and let them know their urine culture came back negative for infection. If she is taking any antibiotics, she may stop taking them at this time.

## 2022-12-08 ENCOUNTER — OFFICE VISIT (OUTPATIENT)
Dept: NEUROLOGY | Age: 72
End: 2022-12-08
Payer: MEDICARE

## 2022-12-08 VITALS
WEIGHT: 141.4 LBS | SYSTOLIC BLOOD PRESSURE: 116 MMHG | BODY MASS INDEX: 25.05 KG/M2 | DIASTOLIC BLOOD PRESSURE: 80 MMHG | OXYGEN SATURATION: 98 % | HEART RATE: 72 BPM

## 2022-12-08 DIAGNOSIS — M54.9 MID BACK PAIN: Primary | ICD-10-CM

## 2022-12-08 DIAGNOSIS — G60.8 SENSORY POLYNEUROPATHY: ICD-10-CM

## 2022-12-08 PROCEDURE — G8417 CALC BMI ABV UP PARAM F/U: HCPCS | Performed by: PSYCHIATRY & NEUROLOGY

## 2022-12-08 PROCEDURE — 1036F TOBACCO NON-USER: CPT | Performed by: PSYCHIATRY & NEUROLOGY

## 2022-12-08 PROCEDURE — G8399 PT W/DXA RESULTS DOCUMENT: HCPCS | Performed by: PSYCHIATRY & NEUROLOGY

## 2022-12-08 PROCEDURE — 3017F COLORECTAL CA SCREEN DOC REV: CPT | Performed by: PSYCHIATRY & NEUROLOGY

## 2022-12-08 PROCEDURE — 1090F PRES/ABSN URINE INCON ASSESS: CPT | Performed by: PSYCHIATRY & NEUROLOGY

## 2022-12-08 PROCEDURE — 99214 OFFICE O/P EST MOD 30 MIN: CPT | Performed by: PSYCHIATRY & NEUROLOGY

## 2022-12-08 PROCEDURE — G8427 DOCREV CUR MEDS BY ELIG CLIN: HCPCS | Performed by: PSYCHIATRY & NEUROLOGY

## 2022-12-08 PROCEDURE — 1123F ACP DISCUSS/DSCN MKR DOCD: CPT | Performed by: PSYCHIATRY & NEUROLOGY

## 2022-12-08 PROCEDURE — G8484 FLU IMMUNIZE NO ADMIN: HCPCS | Performed by: PSYCHIATRY & NEUROLOGY

## 2022-12-08 RX ORDER — NORTRIPTYLINE HYDROCHLORIDE 10 MG/1
20 CAPSULE ORAL NIGHTLY
COMMUNITY
Start: 2022-10-07

## 2022-12-08 RX ORDER — PREGABALIN 100 MG/1
100 CAPSULE ORAL 2 TIMES DAILY
Qty: 60 CAPSULE | Refills: 5
Start: 2022-12-08 | End: 2023-06-06

## 2022-12-08 RX ORDER — LIDOCAINE 50 MG/G
1 PATCH TOPICAL DAILY
Qty: 30 PATCH | Refills: 2 | Status: SHIPPED | OUTPATIENT
Start: 2022-12-08 | End: 2023-01-07

## 2022-12-08 RX ORDER — PREGABALIN 100 MG/1
100 CAPSULE ORAL 3 TIMES DAILY
COMMUNITY
End: 2022-12-08 | Stop reason: SDUPTHER

## 2022-12-08 ASSESSMENT — ENCOUNTER SYMPTOMS
DIARRHEA: 0
WHEEZING: 0
SPUTUM PRODUCTION: 0
SHORTNESS OF BREATH: 0
BLURRED VISION: 0
EYE PAIN: 0
DOUBLE VISION: 0
HEMOPTYSIS: 0
HEARTBURN: 0
EYE REDNESS: 0
ORTHOPNEA: 0
VOMITING: 0
NAUSEA: 0
PHOTOPHOBIA: 1
STRIDOR: 0
ABDOMINAL PAIN: 0
SINUS PAIN: 0
EYE DISCHARGE: 0
BLOOD IN STOOL: 0
COUGH: 0
BACK PAIN: 1
CONSTIPATION: 0
SORE THROAT: 0

## 2022-12-08 ASSESSMENT — VISUAL ACUITY: VA_NORMAL: 1

## 2022-12-08 NOTE — PROGRESS NOTES
64/5/1215  King Brush 71 y.o. female      Chief Complaint:  Chief Complaint   Patient presents with    Follow-up     Sensory polyneuropathy  Anesthesia of skin  TIA          Followup Note:   C/o spine pain in mid spine, no fall. Gabapentin gave her rash in back, on Lyrica 100 mg tid, advised her to take 100 mg bid. Lyrica for pain tingling in feet from sensory neuropathy. C/o memory problem, lives alone, takes care her self, cooks simple meals. Review Test Results: I have reviewed imaging study and lab tests, discussed results with patient in detail. Conclusion: This study showed neurophysiologic evidence of a distal symmetrical sensory polyneuropathy showing slowly worsening, mild in degree. Motor nerves were normal.     Needle EMG to bilateral lower limb and lumbar paraspinal muscles was normal showing no evidence of femoral/sciatic neuropathy, L3/4/5/S1 radiculopathy or plexopathy. No myopathic process. Procedure Details: Under procedure category                 Progress Notes  Nona Howard MD (Physician)     11/3/2021        EEG normal in June 2022. Current Outpatient Medications   Medication Sig Dispense Refill    nortriptyline (PAMELOR) 10 MG capsule Take 20 mg by mouth at bedtime      lidocaine (LIDODERM) 5 % Place 1 patch onto the skin daily 12 hours on, 12 hours off prn 30 patch 2    pregabalin (LYRICA) 100 MG capsule Take 1 capsule by mouth 2 times daily for 180 days.  60 capsule 5    zolpidem (AMBIEN) 5 MG tablet TAKE 1 TABLET BY MOUTH AT BEDTIME      nystatin (MYCOSTATIN) 818124 UNIT/GM cream Apply topically 2 times daily 30 g 5    triamcinolone (KENALOG) 0.1 % cream Apply topically 2 times daily 45 g 5    Rosuvastatin Calcium (CRESTOR PO) Take by mouth      traMADol (ULTRAM) 50 MG tablet TAKE 1 TABLET BY MOUTH TWICE DAILY      aluminum & magnesium hydroxide-simethicone (MAALOX) 411-249-54 MG/5ML SUSP suspension Take 15 mLs by mouth as needed      hydrocortisone 2.5 % cream INSERT CREAM INTO RECTUM FOUR TIMES DAILY AS DIRECTED      Lifitegrast 5 % SOLN PRN    xiidra      LORazepam (ATIVAN) 0.5 MG tablet Take 0.5-1 tablets by mouth 2 times daily as needed. polyethylene glycol (GLYCOLAX) 17 GM/SCOOP powder Take 17 g by mouth as needed       No current facility-administered medications for this visit. Allergies   Allergen Reactions    Duloxetine Other (See Comments)     \"something going on with head'    Duloxetine Hcl Other (See Comments)    Lactose Other (See Comments)    Sucralose Other (See Comments)    Gabapentin Rash    Nickel Rash         Review of Systems:  Review of Systems   Constitutional:  Negative for chills, diaphoresis, fever, malaise/fatigue and weight loss. HENT:  Negative for congestion, ear discharge, ear pain, hearing loss, nosebleeds, sinus pain, sore throat and tinnitus. Eyes:  Positive for photophobia (due to eye drops). Negative for blurred vision, double vision, pain, discharge and redness. Respiratory:  Negative for cough, hemoptysis, sputum production, shortness of breath, wheezing and stridor. Cardiovascular:  Negative for chest pain, palpitations, orthopnea, claudication, leg swelling and PND. Gastrointestinal:  Negative for abdominal pain, blood in stool, constipation, diarrhea, heartburn, melena, nausea and vomiting. Genitourinary:  Negative for dysuria, flank pain, frequency, hematuria and urgency. Musculoskeletal:  Positive for back pain (chronic), joint pain (chronic) and neck pain (chronic). Negative for falls and myalgias. Skin:  Negative for itching and rash. Neurological:  Positive for dizziness (some, thinks it's due to med increase), weakness (whole body due to neuropathy) and headaches. Negative for tingling, tremors, sensory change, speech change, focal weakness, seizures and loss of consciousness. Endo/Heme/Allergies:  Negative for environmental allergies and polydipsia. Does not bruise/bleed easily. Psychiatric/Behavioral:  Positive for depression (some) and memory loss. Negative for hallucinations, substance abuse and suicidal ideas. The patient is nervous/anxious. The patient does not have insomnia. Examination:  Vitals:    12/08/22 1412   BP: 116/80   Pulse: 72   SpO2: 98%   Weight: 141 lb 6.4 oz (64.1 kg)        Physical Exam  Eyes:      Extraocular Movements: EOM normal.      Pupils: Pupils are equal, round, and reactive to light. Musculoskeletal:         General: Tenderness present. Neurological:      Mental Status: She is oriented to person, place, and time. Motor: Motor strength is normal.      Coordination: Finger-Nose-Finger Test and Romberg Test normal.      Gait: Gait is intact. Deep Tendon Reflexes:      Reflex Scores:       Tricep reflexes are 2+ on the right side and 2+ on the left side. Bicep reflexes are 2+ on the right side and 2+ on the left side. Brachioradialis reflexes are 2+ on the right side and 2+ on the left side. Patellar reflexes are 2+ on the right side and 2+ on the left side. Achilles reflexes are 2+ on the right side and 2+ on the left side. Psychiatric:         Speech: Speech normal.        Neurologic Exam     Mental Status   Oriented to person, place, and time. Concentration: normal.   Speech: speech is normal   Level of consciousness: alert  Knowledge: good. Normal comprehension. Cranial Nerves     CN II   Visual fields full to confrontation. Visual acuity: normal  Right visual field deficit: none  Left visual field deficit: none     CN III, IV, VI   Pupils are equal, round, and reactive to light. Extraocular motions are normal.   Right pupil: Size: 3 mm. Shape: regular. Left pupil: Size: 3 mm. Shape: regular. Nystagmus: none   Diplopia: none  Ophthalmoparesis: none  Upgaze: normal    CN V   Facial sensation intact. CN VII   Facial expression full, symmetric.      CN VIII   CN VIII normal.     CN IX, X   CN IX normal.   CN X normal.     CN XI   CN XI normal.     CN XII   CN XII normal.     Motor Exam   Muscle bulk: normal  Overall muscle tone: normal  Right arm pronator drift: absent  Left arm pronator drift: absent    Strength   Strength 5/5 throughout. Sensory Exam   Light touch normal.   Vibration normal.   Right leg proprioception: decreased from toes  Left leg proprioception: decreased from toes    Tip of toes pinprick reduced. Gait, Coordination, and Reflexes     Gait  Gait: normal    Coordination   Romberg: negative  Finger to nose coordination: normal    Tremor   Resting tremor: absent  Intention tremor: absent  Action tremor: absent    Reflexes   Right brachioradialis: 2+  Left brachioradialis: 2+  Right biceps: 2+  Left biceps: 2+  Right triceps: 2+  Left triceps: 2+  Right patellar: 2+  Left patellar: 2+  Right achilles: 2+  Left achilles: 2+Cautious gait. Assessment / Plan:    Ana Maria Lynne was seen today for follow-up. Diagnoses and all orders for this visit:    Mid back pain  -     lidocaine (LIDODERM) 5 %; Place 1 patch onto the skin daily 12 hours on, 12 hours off prn    Sensory polyneuropathy  -     pregabalin (LYRICA) 100 MG capsule; Take 1 capsule by mouth 2 times daily for 180 days. Advised patient to have regular physical exercise. Try topical lidoderm, may take lower dosage of lyrica 100 mg bid due to jerking legs. Examination today quite benign. I have spent 35 min, greater than 50% of discussing and counseling with patient, for treatment and diagnostic plan review.

## 2023-01-24 ENCOUNTER — HOSPITAL ENCOUNTER (OUTPATIENT)
Dept: MAMMOGRAPHY | Age: 73
Discharge: HOME | End: 2023-01-27
Payer: MEDICARE

## 2023-01-24 DIAGNOSIS — N63.10 BREAST MASS, RIGHT: ICD-10-CM

## 2023-01-24 DIAGNOSIS — N63.11 MASS OF UPPER OUTER QUADRANT OF RIGHT BREAST: ICD-10-CM

## 2023-01-24 DIAGNOSIS — N64.9 DISORDER OF BREAST: ICD-10-CM

## 2023-01-24 PROCEDURE — 76642 ULTRASOUND BREAST LIMITED: CPT

## 2023-01-24 PROCEDURE — 77065 DX MAMMO INCL CAD UNI: CPT

## 2023-01-27 DIAGNOSIS — N63.10 MASS OF RIGHT BREAST, UNSPECIFIED QUADRANT: ICD-10-CM

## 2023-01-27 DIAGNOSIS — Z12.31 SCREENING MAMMOGRAM, ENCOUNTER FOR: Primary | ICD-10-CM

## 2023-02-21 ENCOUNTER — HOSPITAL ENCOUNTER (OUTPATIENT)
Dept: PHYSICAL THERAPY | Age: 73
Setting detail: RECURRING SERIES
Discharge: HOME | End: 2023-02-24
Payer: MEDICARE

## 2023-02-21 PROCEDURE — 97110 THERAPEUTIC EXERCISES: CPT

## 2023-02-21 PROCEDURE — 97161 PT EVAL LOW COMPLEX 20 MIN: CPT

## 2023-02-21 NOTE — PROGRESS NOTES
Sage Hawthorne  : 1950  Primary: Medicare Part A And B (Medicare)  Secondary: [address] @ [address] [place] [address]  Phone: [phone]  Fax: [phone] No data recorded  Plan of Care/Certification Expiration Date: 23      PT Visit Info:  Plan of Care/Certification Expiration Date: 23      Visit Count:  1    OUTPATIENT PHYSICAL THERAPY:OP NOTE TYPE: Treatment Note 2023       Episode  }Appt Desk             Treatment Diagnosis:  Generalized Muscle Weakness (M62.81)  Low Back Pain (M54.5)  Spondylolisthesis, lumbar regionSpondylolisthesis, lumbar region [M43.16]      Medical/Referring Diagnosis:  Spondylolisthesis, lumbar region [M43.16]  Referring Physician:  Zina Mcnamara MD MD Orders:  PT Eval and Treat   Date of Onset:  No data recorded   Allergies:   Duloxetine, Duloxetine hcl, Lactose, Sucralose, Gabapentin, and Nickel  Restrictions/Precautions:  No data recorded  No data recorded   Interventions Planned (Treatment may consist of any combination of the following):    Current Treatment Recommendations: Aquatics; Strengthening; ROM; Balance training; Transfer training; Endurance training     Subjective Comments:  I am in pain     Initial:}     7/10Post Session:        6/10  Medications Last Reviewed:  2023  Updated Objective Findings:  See evaluation note from today  Treatment   THERAPEUTIC EXERCISE: (24 minutes):    Exercises per grid below to improve mobility, strength, and balance. Required minimal visual, verbal, and manual cues to promote proper body alignment, promote proper body posture, and promote proper body mechanics. Progressed resistance and range as indicated. Date:  23 Date:   Date:     Activity/Exercise Parameters Parameters Parameters   Rows 10x10\"     SKTC 30\"x3                                     Aquatic Therapy (- minutes):  Aquatic treatment performed per flow grid for Decreased muscle strength. Cues provided for -. Assistance by therapist provided for -. Patient has difficulty with -. Aquatic Exercise Log       Date  - Date   Date   Date   Date     Activity/ Exercise Parameters Parameters Parameters Parameters Parameters   Walking forward        Walking backward        Walking sideways          Marching          Goose Step          Tip toes          Heels          Lunges        Side step squats        LE Exercises          Hip Flex/Ext          Hip Abd/Add          Hip IR/ER          Calf raises          Knee Flex          Squats          Leg Circles          Step Ups        UE Exercises          Squeeze In          Push Down          Pull Down          Bicep/Tricep        Rows/Press outs         Chi Positions          Trunk Rotation        Deep H2O/ Noodles          Stabilization          Arms only          Legs only        Stewart Manning walk        Lower abdominal   work           Cardio          Jogging        Lap   Swimming          Stretches          Hamstrings          Heelcords          Piriformis          Neck              Treatment/Session Summary:    Treatment Assessment: Verbalized understanding of HEP and POC. Communication/Consultation:   Faxed IE to MD  Equipment provided today:  HEP  Recommendations/Intent for next treatment session: Next visit will focus on aquatics.     Total Treatment Billable Duration:  24 minutes  Time In: 9350  Time Out: [facility] PT       Charge Capture  }Post Session Pain  PT Visit [facility] Portal  MD Guidelines  Scanned Media  Benefits  MyChart    Future Appointments   Date Time Provider Anya Mcmillan   2/21/2023  3:15 PM Darrion Sol, PT [facility]   6/8/2023  1:30 PM Chas Lee MD [facility] AMB   7/26/2023 10:45 AM S[facility] BI ROOM 4 [facility]

## 2023-02-21 NOTE — THERAPY EVALUATION
Marine Dubon  : 1950  Primary: Medicare Part A And B (Medicare)  Secondary: [address] @ [address] [facility] [address]  Phone: [phone]  Fax: [phone]    Plan of Care/Certification Expiration Date: 23    PT Visit Info:  Plan of Care/Certification Expiration Date: 23    Visit Count:  1                OUTPATIENT PHYSICAL THERAPY:             OP NOTE TYPE: Initial Assessment 2023               Episode (lumbar spondylolithesis) Appt Desk         Treatment Diagnosis:  Generalized Muscle Weakness (M62.81)  Low Back Pain (M54.5)  Spondylolisthesis, lumbar regionSpondylolisthesis, lumbar region [M43.16]  Medical/Referring Diagnosis:  Spondylolisthesis, lumbar region [M43.16]  Referring Physician:  Alondra Cantu MD MD Orders:  PT Eval and Treat   Return MD Appt:    Future Appointments   Date Time Provider Anya Mcmillan   2023  1:30 PM Renetta Campos MD [facility] AMB   2023 10:45 AM SFE [facility] BI ROOM 4 [facility]       Date of Onset:       Allergies:  Duloxetine, Duloxetine hcl, Lactose, Sucralose, Gabapentin, and Nickel  Restrictions/Precautions:           Medications Last Reviewed:  2023     SUBJECTIVE   History of Injury/Illness (Reason for Referral):  \"I've had a lot of back pain in my life. 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 I don't know wthat was the start of my pain. I did gardening but I've had on and off pain for a long time. This pain doesn't feel muscular. I went to the chiro because I had back pain but I think he made something worse. Patient Stated Goal(s): \"To improve pain. \"  Initial:      /10 Post Session:      /10  Past Medical History/Comorbidities:   Ms. Nela Jacobo  has a past medical history of Acne rosacea, Agatston CAC score 100-199, Cervical spondylosis, Chronic insomnia, Chronic thoracic spine pain, Colon polyps, Depression, major, single episode, mild (HCC), Dyslipidemia, GERD (gastroesophageal reflux disease), Low back pain with bilateral sciatica, Osteoarthritis, generalized, Sensory neuropathy, Sensory polyneuropathy, Sigmoid diverticulosis, and Small intestinal bacterial overgrowth. Ms. Santiago Correa  has a past surgical history that includes  section (); Colonoscopy (2016); and Upper gastrointestinal endoscopy (, ). Social History/Living Environment:    Social History     Socioeconomic History    Marital status: Legally      Spouse name: Not on file    Number of children: Not on file    Years of education: Not on file    Highest education level: Not on file   Occupational History    Not on file   Tobacco Use    Smoking status: Never    Smokeless tobacco: Never   Vaping Use    Vaping Use: Never used   Substance and Sexual Activity    Alcohol use: Yes     Alcohol/week: 0.0 standard drinks    Drug use: No    Sexual activity: Not Currently   Other Topics Concern    Not on file   Social History Narrative    She is originally from [place]. She met her  in [place]. She moved to [address] [place] [address]. Social Determinants of Health     Financial Resource Strain: Not on file   Food Insecurity: Not on file   Transportation Needs: Not on file   Physical Activity: Not on file   Stress: Not on file   Social Connections: Not on file   Intimate Partner Violence: Not on file   Housing Stability: Not on file             Prior Level of Function/Work/Activity: Independent              Learning:   Does the patient/guardian have any barriers to learning?: No barriers     Fall Risk Scale: Botello Total Score: 0  Botello Fall Risk: Low (0-24)           OBJECTIVE     Observation/Postural and Gait Assessment: hunched over position secondary to pain. Presents with ice pack she brought from home applied to lower L/S. Walks gingerly and very low. Palpation: lower L/S. Reports osteopenia. AROM:    anterior rotation R innominate. Lumbar extension: Painful°   Lumbar flexion: Limited and painful  Comparable sign°   Lumbar left side bend: 15° Aches   Lumbar right side bend: 14 aches     AROM (PROM) Left Right   Cervical ROM Limited due to pain/OA all directions. Strength:  Manual Muscle Test (out of 5) Left Right   Knee extension 4+ 4+   Knee flexion 4+ 4+   Hip flexion 4+ 4+   Hip extension     Hip abduction 4 4   Ankle DF 5 5   Ankle PF 5 5   Gross abdominal strength        Passive Accessory Motion: mm guarding present    Special Tests:  +Gowers, Localized pain. Neurological Screen:      Dermatomes: Sensation testing through bilateral lower quadrants for light touch is peripheral neuropathy hands, feets, arms on lyrica for this. .100 mg/2 qd  Reflexes: Patellar (L4) and Achilles (S1) are 2+ and 2+. Neural tension tests: Passive straight leg raise (SLR) test is -. Crossed SLR test is -. Slump test is -. Femoral nerve stretch test is -. Functional Mobility:  Limited, limited shoulder ROM due to poor posture. ASSESSMENT   Initial Assessment:  Pain dominant lower lumbar pain consistent with spondylolisthesis. Recommending aquatics. Problem List: (Impacting functional limitations): Therapy Prognosis:   Therapy Prognosis: Good     Initial Assessment Complexity:   Decision Making: Low Complexity    PLAN   Effective Dates: 2.21.23 TO Plan of Care/Certification Expiration Date: 05/22/23   Frequency/Duration:     Interventions Planned (Treatment may consist of any combination of the following):    Current Treatment Recommendations: Aquatics; Strengthening; ROM; Balance training; Transfer training;  Endurance training     Short term goals (45 days)  Lisset Ornelas will be independent with aquatic exercises   Lisset Ornelas will be able to tolerate 45 min of exercises with minimal rest breaks and good compliance  Bekah Thompson will be able to go up and down stairs with use of rail and modified independence. Bekah Thompson will improve Oswestry by 8 points to demonstrate improvement in strength and functional tolerance. Outcome Measure: Tool Used: Modified Oswestry Low Back Pain Questionnaire  Score:  Initial: 22/50  Most Recent: X/50 (Date: -- )   Interpretation of Score: Each section is scored on a 0-5 scale, 5 representing the greatest disability. The scores of each section are added together for a total score of 50. Medical Necessity:   > Skilled intervention continues to be required due to current impairments. Reason For Services/Other Comments:  > Patient continues to require skilled intervention due to current LOF. Total Duration:  Time In: 1515  Time Out: 5890    Regarding Fariba Serrano's therapy, I certify that the treatment plan above will be carried out by a therapist or under their direction.   Thank you for this referral,  Minh Valle, PT     Referring Physician Signature: Anabel Miranda MD _______________________________ Date _____________        Post Session Pain  Charge Capture  PT Visit Info MD Rojelio Sanabria

## 2023-02-23 ENCOUNTER — HOSPITAL ENCOUNTER (OUTPATIENT)
Dept: PHYSICAL THERAPY | Age: 73
Setting detail: RECURRING SERIES
Discharge: HOME | End: 2023-02-26
Payer: MEDICARE

## 2023-02-23 PROCEDURE — 97113 AQUATIC THERAPY/EXERCISES: CPT

## 2023-02-23 NOTE — PROGRESS NOTES
Ana Joel  : 1950  Primary: Medicare Part A And B (Medicare)  Secondary: [address] @ [address] [place] [address]  Phone: [phone]  Fax: [phone] No data recorded  Plan of Care/Certification Expiration Date: 23      PT Visit Info:  Plan of Care/Certification Expiration Date: 23      Visit Count:  2    OUTPATIENT PHYSICAL THERAPY:OP NOTE TYPE: Treatment Note 2023       Episode  }Appt Desk             Treatment Diagnosis:  Generalized Muscle Weakness (M62.81)  Low Back Pain (M54.5)  Spondylolisthesis, lumbar regionSpondylolisthesis, lumbar region [M43.16]      Medical/Referring Diagnosis:  Spondylolisthesis, lumbar region [M43.16]  Referring Physician:  Bibi Ashraf MD MD Orders:  PT Eval and Treat   Date of Onset:  No data recorded   Allergies:   Duloxetine, Duloxetine hcl, Lactose, Sucralose, Gabapentin, and Nickel  Restrictions/Precautions:  No data recorded  No data recorded   Interventions Planned (Treatment may consist of any combination of the following):    Current Treatment Recommendations: Aquatics; Strengthening; ROM; Balance training; Transfer training; Endurance training     Subjective Comments:    Patient reports feeling about the same. Initial:}     7/10Post Session:        6/10  Medications Last Reviewed:  2023  Updated Objective Findings:  None Today  Treatment   THERAPEUTIC EXERCISE: ( minutes):    Exercises per grid below to improve mobility, strength, and balance. Required minimal visual, verbal, and manual cues to promote proper body alignment, promote proper body posture, and promote proper body mechanics. Progressed resistance and range as indicated. Date:  23 Date:   Date:     Activity/Exercise Parameters Parameters Parameters   Rows 10x10\"     SKTC 30\"x3                                     Aquatic Therapy ( 45- minutes):  Aquatic treatment performed per flow grid for Decreased muscle strength. Cues provided for -. Assistance by therapist provided for -. Patient has difficulty with -. Aquatic Exercise Log       Date  2-23/23 Date   Date   Date   Date     Activity/ Exercise Parameters Parameters Parameters Parameters Parameters   Walking forward 5 min       Walking backward        Walking sideways 5 min         Marching          Goose Step          Tip toes          Heels          Lunges        Side step squats        LE Exercises          Hip Flex/Ext          Hip Abd/Add          Hip IR/ER          Calf raises          Knee Flex          Squats          Leg Circles          Step Ups        UE Exercises          Squeeze In 2 x 10         Push Down 2 x 10         Pull Down          Scapula /retraction 2 x 10       Rows/Press outs 2 x 10       Shoulder shrug 2 x 10         Trunk Rotation 2 x 10       Deep H2O/ Noodles          Stabilization          Arms only          Legs only        Stewart Manning walk        Lower abdominal   work           Cardio          Jogging        Lap   Swimming          Stretches          Hamstrings          Heelcords          Piriformis          Neck              Treatment/Session Summary:    Treatment Assessment:     Patient tolerated all aquatic exercises well. Instucted patient to continue home exercises as directed. Communication/Consultation:   Faxed IE to MD  Equipment provided today:  HEP  Recommendations/Intent for next treatment session: Next visit will focus on aquatics.     Total Treatment Billable Duration:  45 minutes  Time In: 1193  Time Out: [facility]       Charge Capture  }Post Session Pain  PT Visit Info  [facility] Portal  MD Guidelines  Scanned Media  Benefits  MyChart    Future Appointments   Date Time Provider Anya Mcmillan   3/1/2023  9:30 AM Katey Koch, PT [facility] S[facility]   3/6/2023 10:15 AM Manolo Shaw PTA [facility]   3/9/2023 10:15 AM Yobani Chiu, [facility]   3/13/2023 10:[facility]   3/15/2023 10:15 AM Yobani Chiu, PTA [facility] O   3/20/2023 10:15 [facility]   3/23/2023 10:15 AM Yobani Chiu, PTA [facility] O   3/27/2023 10:15 [facility]   3/30/2023 10:15 AM Yobani Chiu, PTA [facility]   6/8/2023  1:30 PM Maureen Dunlap MD [facility] AMB   7/26/2023 10:45 AM EVELYN [facility] ROOM 4 [facility]

## 2023-03-01 ENCOUNTER — HOSPITAL ENCOUNTER (OUTPATIENT)
Dept: PHYSICAL THERAPY | Age: 73
Setting detail: RECURRING SERIES
Discharge: HOME | End: 2023-03-04
Payer: MEDICARE

## 2023-03-01 PROCEDURE — 97113 AQUATIC THERAPY/EXERCISES: CPT

## 2023-03-01 NOTE — PROGRESS NOTES
Aspen Serrano  : 1950  Primary: Medicare Part A And B (Medicare)  Secondary: [employer] S[employer]IOR MEDICARE SUPP [facility] @ [address] [place] [address]  Phone: [phone]  Fax: [phone] No data recorded  Plan of Care/Certification Expiration Date: 23      PT Visit Info:  Plan of Care/Certification Expiration Date: 23      Visit Count:  3    OUTPATIENT PHYSICAL THERAPY:OP NOTE TYPE: Treatment Note 3/1/2023       Episode  }Appt Desk             Treatment Diagnosis:  Generalized Muscle Weakness (M62.81)  Low Back Pain (M54.5)  Spondylolisthesis, lumbar regionSpondylolisthesis, lumbar region [M43.16]      Medical/Referring Diagnosis:  Spondylolisthesis, lumbar region [M43.16]  Referring Physician:  Lj Cedeño MD MD Orders:  PT Eval and Treat   Date of Onset:  No data recorded   Allergies:   Duloxetine, Duloxetine hcl, Lactose, Sucralose, Gabapentin, and Nickel  Restrictions/Precautions:  No data recorded  No data recorded   Interventions Planned (Treatment may consist of any combination of the following):    Current Treatment Recommendations: Aquatics; Strengthening; ROM; Balance training; Transfer training; Endurance training     Subjective Comments:  I don't have pain right now, but I do have some pain in the shoudlers a little      Initial:}     1/10Post Session:        110  Medications Last Reviewed:  3/1/2023  Updated Objective Findings:  None Today  Treatment   THERAPEUTIC EXERCISE: ( minutes):    Exercises per grid below to improve mobility, strength, and balance.  Required minimal visual, verbal, and manual cues to promote proper body alignment, promote proper body posture, and promote proper body mechanics.  Progressed resistance and range as indicated.   Date:  23 Date:   Date:     Activity/Exercise Parameters Parameters Parameters   Rows 10x10\"     SKTC 30\"x3                                     Aquatic Therapy ( 53  minutes): Aquatic treatment performed per flow grid for Decreased muscle strength. Cues provided for -. Assistance by therapist provided for -. Patient has difficulty with -. Aquatic Exercise Log       Date  2-23/23 Date  3/1/23 Date   Date   Date     Activity/ Exercise Parameters Parameters Parameters Parameters Parameters   Walking forward 5 min 5 min      Walking backward  5 minutes       Walking sideways 5 min 5 min        Marching  2 min        Goose Step          Tip toes          Heels          Lunges        Side step squats        LE Exercises          Hip Flex/Ext          Hip Abd/Add          Hip IR/ER          Calf raises          Knee Flex          Squats          Leg Circles          Step Ups        UE Exercises          Squeeze In 2 x 10 2x10        Push Down 2 x 10 2x10        Pull Down          Scapula /retraction 2 x 10 2x10      Rows/Press outs 2 x 10 2x10      Shoulder shrug 2 x 10 2x10        Trunk Rotation 2 x 10 2x10      Deep H2O/ Noodles          Stabilization          Arms only          Legs only        Terry Manning walk        Lower abdominal   work           Cardio          Jogging        Lap   Swimming          Stretches          Hamstrings          Heelcords          Piriformis          Neck              Treatment/Session Summary:    Treatment Assessment:   Verbalized understanding of HEP and POC. Initiated [employer] with posture. Communication/Consultation:   Faxed IE to MD  Equipment provided today:  HEP  Recommendations/Intent for next treatment session: Next visit will focus on aquatics.     Total Treatment Billable Duration:  53 minutes  Time In: 0930  Time Out: [address]  }Post Session Pain  PT Visit [address] Portal  MD [address]    Future Appointments   Date Time Provider Anya Mcmillan   3/6/2023 10:15 AM Barbara Boss PTA [facility]   3/9/2023 10:15 AM Moraima Roots, PTA [facility]   3/13/2023 10:15 AM Moraima Roots, PTA [facility]   3/15/2023 10:15 AM Moraima Roots, PTA [facility]   3/20/2023 10:15 AM Moraima Roots, PTA [facility]   3/23/2023 10:15 AM Moraima Roots, PTA [facility]   3/27/2023 10:15 AM Moraima Roots, PTA [facility]   3/30/2023 10:15 AM Moraima Roots, PTA [facility]   6/8/2023  1:30 PM Katelyn Joyner MD BSNI [facility] AMB   7/26/2023 10:45 AM SFE [facility] ROOM 4 SFERMAM E

## 2023-03-06 ENCOUNTER — HOSPITAL ENCOUNTER (OUTPATIENT)
Dept: PHYSICAL THERAPY | Age: 73
Setting detail: RECURRING SERIES
Discharge: HOME | End: 2023-03-09
Payer: MEDICARE

## 2023-03-06 PROCEDURE — 97113 AQUATIC THERAPY/EXERCISES: CPT

## 2023-03-06 NOTE — PROGRESS NOTES
Tomasa Mean  : 1950  Primary: Medicare Part A And B (Medicare)  Secondary: [address] @ [address] [place] [address]  Phone: [phone]  Fax: [phone] No data recorded  Plan of Care/Certification Expiration Date: 23      PT Visit Info:  Plan of Care/Certification Expiration Date: 23      Visit Count:  4    OUTPATIENT PHYSICAL THERAPY:OP NOTE TYPE: Treatment Note 3/6/2023       Episode  }Appt Desk             Treatment Diagnosis:  Generalized Muscle Weakness (M62.81)  Low Back Pain (M54.5)  Spondylolisthesis, lumbar regionSpondylolisthesis, lumbar region [M43.16]      Medical/Referring Diagnosis:  Spondylolisthesis, lumbar region [M43.16]  Referring Physician:  Jb Eddy MD MD Orders:  PT Eval and Treat   Date of Onset:  No data recorded   Allergies:   Duloxetine, Duloxetine hcl, Lactose, Sucralose, Gabapentin, and Nickel  Restrictions/Precautions:  No data recorded  No data recorded   Interventions Planned (Treatment may consist of any combination of the following):    Current Treatment Recommendations: Aquatics; Strengthening; ROM; Balance training; Transfer training; Endurance training     Subjective Comments: Patient reports back is feeling better, but still has some discomfort with certain movements. Initial:}     3/10Post Session:        2/10  Medications Last Reviewed:  3/6/2023  Updated Objective Findings:  None Today  Treatment   THERAPEUTIC EXERCISE: ( minutes):    Exercises per grid below to improve mobility, strength, and balance. Required minimal visual, verbal, and manual cues to promote proper body alignment, promote proper body posture, and promote proper body mechanics. Progressed resistance and range as indicated.    Date:  23 Date:   Date:     Activity/Exercise Parameters Parameters Parameters   Rows 10x10\"     SKTC 30\"x3                                     Aquatic Therapy ( 45 minutes): Aquatic treatment performed per flow grid for Decreased muscle strength. Cues provided for -. Assistance by therapist provided for -. Patient has difficulty with -. Aquatic Exercise Log       Date  2-23/23 Date  3/1/23 Date  3/6 Date   Date     Activity/ Exercise Parameters Parameters Parameters Parameters Parameters   Walking forward 5 min 5 min 5 min     Walking backward  5 minutes  5 min     Walking sideways 5 min 5 min 5 min       Marching  2 min        Goose Step          Tip toes          Heels          Lunges        Side step squats        LE Exercises          Hip Flex/Ext   2 x 10       Hip Abd/Add   2 x 10       Hip IR/ER          Calf raises          Knee Flex          Squats          Leg Circles          Step Ups        UE Exercises          Squeeze In 2 x 10 2x10 2 x 10       Push Down 2 x 10 2x10        Pull Down          Scapula /retraction 2 x 10 2x10      Rows/Press outs 2 x 10 2x10      Shoulder shrug 2 x 10 2x10 2 x 10       Trunk Rotation 2 x 10 2x10 2 x 10     Deep H2O/ Noodles          Stabilization          Arms only          Legs only        Stewart Manning walk        Lower abdominal   work           Cardio          Jogging        Lap   Swimming          Stretches          Hamstrings          Heelcords          Piriformis          Neck              Treatment/Session Summary:    Treatment Assessment:     Initiated aquatics today--assistance with posture. Patient tolerated aquatic exercises well. Instructed patient to continue home exercises as directed. Communication/Consultation:   Faxed IE to MD  Equipment provided today:  HEP  Recommendations/Intent for next treatment session: Next visit will focus on aquatics.     Total Treatment Billable Duration:  45 minutes  Time In: 8032  Time Out: 58 [place], [place]       Charge Capture  }Post Session Pain  PT Visit Info  [employer] Portal  MD Guidelines  Scanned Media Benefits  MyChart    Future Appointments   Date Time Provider Anya Westbrooki   3/9/2023 10:15 AM Dorcus , [facility]   3/13/2023 10:15 AM Dorcus , [facility]   3/15/2023 10:15 AM Dorcus , [facility]   3/20/2023 10:15 AM Dorcus , [facility]   3/23/2023 10:15 AM Dorcus , [facility]   3/27/2023 10:15 AM Dorcus , PTA [facility]   3/30/2023 10:15 AM Dorcus , PTA [facility]   6/8/2023  1:30 PM Nita Brody MD [facility]   7/26/2023 10:45 AM [facility]

## 2023-03-09 ENCOUNTER — HOSPITAL ENCOUNTER (OUTPATIENT)
Dept: PHYSICAL THERAPY | Age: 73
Setting detail: RECURRING SERIES
Discharge: HOME | End: 2023-03-12
Payer: MEDICARE

## 2023-03-09 PROCEDURE — 97113 AQUATIC THERAPY/EXERCISES: CPT

## 2023-03-09 NOTE — PROGRESS NOTES
Bekah Thompson  : 1950  Primary: Medicare Part A And B (Medicare)  Secondary: [address] @ [address] [place] [address]  Phone: [phone]  Fax: [phone] No data recorded  Plan of Care/Certification Expiration Date: 23      PT Visit Info:  Plan of Care/Certification Expiration Date: 23      Visit Count:  5    OUTPATIENT PHYSICAL THERAPY:OP NOTE TYPE: Treatment Note 3/9/2023       Episode  }Appt Desk             Treatment Diagnosis:  Generalized Muscle Weakness (M62.81)  Low Back Pain (M54.5)  Spondylolisthesis, lumbar regionSpondylolisthesis, lumbar region [M43.16]      Medical/Referring Diagnosis:  Spondylolisthesis, lumbar region [M43.16]  Referring Physician:  Anabel Miranda MD MD Orders:  PT Eval and Treat   Date of Onset:  No data recorded   Allergies:   Duloxetine, Duloxetine hcl, Lactose, Sucralose, Gabapentin, and Nickel  Restrictions/Precautions:  No data recorded  No data recorded   Interventions Planned (Treatment may consist of any combination of the following):    Current Treatment Recommendations: Aquatics; Strengthening; ROM; Balance training; Transfer training; Endurance training     Subjective Comments: Patient reports she was sore after last treatment visit, but a little better over all. Initial:}     3/10Post Session:        2/10  Medications Last Reviewed:  3/9/2023  Updated Objective Findings:  None Today  Treatment   THERAPEUTIC EXERCISE: ( minutes):    Exercises per grid below to improve mobility, strength, and balance. Required minimal visual, verbal, and manual cues to promote proper body alignment, promote proper body posture, and promote proper body mechanics. Progressed resistance and range as indicated.    Date:  23 Date:   Date:     Activity/Exercise Parameters Parameters Parameters   Rows 10x10\"     SKTC 30\"x3                                     Aquatic Therapy ( 45 minutes): Aquatic treatment performed per flow grid for Decreased muscle strength. Cues provided for -. Assistance by therapist provided for -. Patient has difficulty with -. Aquatic Exercise Log       Date  2-23/23 Date  3/1/23 Date  3/6 Date  3/9 Date     Activity/ Exercise Parameters Parameters Parameters Parameters Parameters   Walking forward 5 min 5 min 5 min 5 min    Walking backward  5 minutes  5 min     Walking sideways 5 min 5 min 5 min 5 min      Marching  2 min        Goose Step          Tip toes          Heels          Lunges        Side step squats        LE Exercises          Hip Flex/Ext   2 x 10 2 x 10      Hip Abd/Add   2 x 10 2 x 10      Hip IR/ER          Calf raises          Knee Flex          Squats          Leg Circles          Step Ups        UE Exercises          Squeeze In 2 x 10 2x10 2 x 10 2 x 10      Push Down 2 x 10 2x10  2 x 10      Pull Down          Scapula /retraction 2 x 10 2x10  2 x 10    Rows/Press outs 2 x 10 2x10  2 x 10    Shoulder shrug 2 x 10 2x10 2 x 10 2 x 10      Trunk Rotation 2 x 10 2x10 2 x 10 2 x 10    Deep H2O/ Noodles          Stabilization          Arms only          Legs only        Terry Manning walk        Lower abdominal   work           Cardio          Jogging        Lap   Swimming          Stretches          Hamstrings          Heelcords          Piriformis          Neck              Treatment/Session Summary:    Treatment Assessment:     Initiated aquatics today--assistance with posture. Patient demonstrated good effort with all aquatic exercises. Good motivation and compliance with home exercises  Communication/Consultation:   Faxed IE to MD  Equipment provided today:  HEP  Recommendations/Intent for next treatment session: Next visit will focus on aquatics.     Total Treatment Billable Duration:  45 minutes  Time In: 3246  Time Out: 58 [place], [place]       Charge Capture  }Post Session Pain  PT Visit [address] [facility] Portal  MD Guidelines  Scanned Media  Benefits  MyChart    Future Appointments   Date Time Provider Anya Mcmillan   3/13/2023 10:15 AM Daisy Hardin, [facility] [place] [place]   3/15/2023 10:15 AM Daisy Hardin, [facility]   3/20/2023 10:15 AM Daisy Hardin, [facility] [place]   3/23/2023 10:15 AM Daisy Hardin, [facility]   3/27/2023 10:15 AM Daisy Hardin, [facility]   3/30/2023 10:15 AM Daisy Hardin, [facility]   6/8/2023  1:30 PM Baldev Houser MD [facility] AMB   7/26/2023 10:45 AM SFE [facility]

## 2023-03-13 ENCOUNTER — HOSPITAL ENCOUNTER (OUTPATIENT)
Dept: PHYSICAL THERAPY | Age: 73
Setting detail: RECURRING SERIES
Discharge: HOME | End: 2023-03-16
Payer: MEDICARE

## 2023-03-13 PROCEDURE — 97113 AQUATIC THERAPY/EXERCISES: CPT

## 2023-03-13 NOTE — PROGRESS NOTES
Jennifer Fisher  : 1950  Primary: Medicare Part A And B (Medicare)  Secondary: [address] @ [address] [place] [address]  Phone: [phone]  Fax: [phone] No data recorded  Plan of Care/Certification Expiration Date: 23      PT Visit Info:  Plan of Care/Certification Expiration Date: 23      Visit Count:  6    OUTPATIENT PHYSICAL THERAPY:OP NOTE TYPE: Treatment Note 3/13/2023       Episode  }Appt Desk             Treatment Diagnosis:  Generalized Muscle Weakness (M62.81)  Low Back Pain (M54.5)  Spondylolisthesis, lumbar regionSpondylolisthesis, lumbar region [M43.16]      Medical/Referring Diagnosis:  Spondylolisthesis, lumbar region [M43.16]  Referring Physician:  Jessenia Romero MD MD Orders:  PT Eval and Treat   Date of Onset:  No data recorded   Allergies:   Duloxetine, Duloxetine hcl, Lactose, Sucralose, Gabapentin, and Nickel  Restrictions/Precautions:  No data recorded  No data recorded   Interventions Planned (Treatment may consist of any combination of the following):    Current Treatment Recommendations: Aquatics; Strengthening; ROM; Balance training; Transfer training; Endurance training     Subjective Comments: Patient reports her back really bothered her over the weekend when she stood for a long time trying to cook. Initial:}     4/10Post Session:        2/10  Medications Last Reviewed:  3/13/2023  Updated Objective Findings:  None Today  Treatment   THERAPEUTIC EXERCISE: ( minutes):    Exercises per grid below to improve mobility, strength, and balance. Required minimal visual, verbal, and manual cues to promote proper body alignment, promote proper body posture, and promote proper body mechanics. Progressed resistance and range as indicated.    Date:  23 Date:   Date:     Activity/Exercise Parameters Parameters Parameters   Rows 10x10\"     SKTC 30\"x3 Aquatic Therapy ( 45 minutes): Aquatic treatment performed per flow grid for Decreased muscle strength. Cues provided for -. Assistance by therapist provided for -. Patient has difficulty with -. Aquatic Exercise Log       Date  2-23/23 Date  3/1/23 Date  3/6 Date  3/9 Date  3/13   Activity/ Exercise Parameters Parameters Parameters Parameters Parameters   Walking forward 5 min 5 min 5 min 5 min 5 min   Walking backward  5 minutes  5 min     Walking sideways 5 min 5 min 5 min 5 min 5 min     Marching  2 min        Goose Step          Tip toes          Heels          Lunges        Side step squats        LE Exercises          Hip Flex/Ext   2 x 10 2 x 10 2 x 10     Hip Abd/Add   2 x 10 2 x 10 2 x 10     Hip IR/ER          Calf raises          Knee Flex          Squats          SLR     2 x 10     Step Ups        UE Exercises          Squeeze In 2 x 10 2x10 2 x 10 2 x 10 2 x 10     Push Down 2 x 10 2x10  2 x 10 2 x 10     Pull Down          Scapula /retraction 2 x 10 2x10  2 x 10 2 x 10   Rows/Press outs 2 x 10 2x10  2 x 10    Shoulder shrug 2 x 10 2x10 2 x 10 2 x 10 2 x 10     Trunk Rotation 2 x 10 2x10 2 x 10 2 x 10    Deep H2O/ Noodles          Stabilization          Arms only          Legs only        Stewart Manning walk        Lower abdominal   work           Cardio          Jogging        Lap   Swimming          Stretches          Hamstrings          Heelcords          Piriformis          Neck              Treatment/Session Summary:    Treatment Assessment: Patient continues to be pleased with the pool. Stressed the importance of not over doing it with at home exercises and to make sure she uses proper posture during all exercises. Communication/Consultation:   Faxed IE to MD  Equipment provided today:  HEP  Recommendations/Intent for next treatment session: Next visit will focus on aquatics.     Total Treatment Billable Duration:  45 minutes  Time In: 0766  Time Out: 130[address] [place], [place]       Charge Capture  }Post Session Pain  PT Visit Info  [employer] Portal  MD Guidelines  Scanned Media  Benefits  MyChart    Future Appointments   Date Time Provider Anya Deyanira   3/15/2023 10:15 AM [facility]   3/20/2023 10:15 AM [facility]   3/23/2023 10:15 AM [facility]   [facility]   [facility]   6/8/2023  1:30 PM Lashae Carlos MD [facility] AMB   7/26/2023 10:45 AM EVELYN [facility] ROOM 4 [facility]

## 2023-03-15 ENCOUNTER — HOSPITAL ENCOUNTER (OUTPATIENT)
Dept: PHYSICAL THERAPY | Age: 73
Setting detail: RECURRING SERIES
Discharge: HOME | End: 2023-03-18
Payer: MEDICARE

## 2023-03-15 PROCEDURE — 97113 AQUATIC THERAPY/EXERCISES: CPT

## 2023-03-15 NOTE — PROGRESS NOTES
Karishma Brochure  : 1950  Primary: Medicare Part A And B (Medicare)  Secondary: [address] @ [address] [place] [id number]  Phone: [phone]  Fax: [phone] No data recorded  Plan of Care/Certification Expiration Date: 23      PT Visit Info:  Plan of Care/Certification Expiration Date: 23      Visit Count:  7    OUTPATIENT PHYSICAL THERAPY:OP NOTE TYPE: Treatment Note 3/15/2023       Episode  }Appt Desk             Treatment Diagnosis:  Generalized Muscle Weakness (M62.81)  Low Back Pain (M54.5)  Spondylolisthesis, lumbar regionSpondylolisthesis, lumbar region [M43.16]      Medical/Referring Diagnosis:  Spondylolisthesis, lumbar region [M43.16]  Referring Physician:  Elena Carias MD MD Orders:  PT Eval and Treat   Date of Onset:  No data recorded   Allergies:   Duloxetine, Duloxetine hcl, Lactose, Sucralose, Gabapentin, and Nickel  Restrictions/Precautions:  No data recorded  No data recorded   Interventions Planned (Treatment may consist of any combination of the following):    Current Treatment Recommendations: Aquatics; Strengthening; ROM; Balance training; Transfer training; Endurance training     Subjective Comments:   Patient reports feeling pretty good today just soreness. Initial:}     3/10Post Session:        2/10  Medications Last Reviewed:  3/15/2023  Updated Objective Findings:  None Today  Treatment   THERAPEUTIC EXERCISE: ( minutes):    Exercises per grid below to improve mobility, strength, and balance. Required minimal visual, verbal, and manual cues to promote proper body alignment, promote proper body posture, and promote proper body mechanics. Progressed resistance and range as indicated. Date:  23 Date:   Date:     Activity/Exercise Parameters Parameters Parameters   Rows 10x10\"     SKTC 30\"x3                                     Aquatic Therapy ( 45 minutes):  Aquatic treatment performed per flow grid for Decreased muscle strength. Cues provided for -. Assistance by therapist provided for -. Patient has difficulty with -. Aquatic Exercise Log       Date  3/6 Date  3/9 Date  3/13 Date  3/15   Activity/ Exercise Parameters Parameters Parameters Parameters   Walking forward 5 min 5 min 5 min 5 min   Walking backward 5 min      Walking sideways 5 min 5 min 5 min  5 min     Marching         Goose Step         Tip toes         Heels         Lunges       Side step squats       LE Exercises         Hip Flex/Ext 2 x 10 2 x 10 2 x 10 2 x 10     Hip Abd/Add 2 x 10 2 x 10 2 x 10 2 x 10     Hip IR/ER         Calf raises         Knee Flex         Squats         SLR   2 x 10 2 x 10     Step Ups       UE Exercises         Squeeze In 2 x 10 2 x 10 2 x 10 2 x 10     Push Down  2 x 10 2 x 10 2 x 10     Pull Down         Scapula /retraction  2 x 10 2 x 10 2 x 10   Rows/Press outs  2 x 10  2 x 10   Shoulder shrug 2 x 10 2 x 10 2 x 10 2 x 10     Trunk Rotation 2 x 10 2 x 10     Deep H2O/ Noodles         Stabilization         Arms only         Legs only       [employer] walk       Lower abdominal   work          Cardio         Jogging       Lap   Swimming         Stretches         Hamstrings         Heelcords         Piriformis         Neck             Treatment/Session Summary:    Treatment Assessment: Patient demonstated good effort with all aquatic exercises. Patient feels good about her progress. Good compliance with home exercises. Communication/Consultation:   Faxed IE to MD  Equipment provided today:  HEP  Recommendations/Intent for next treatment session: Next visit will focus on aquatics.     Total Treatment Billable Duration:  45 minutes  Time In: 8241  Time Out: 58 [place], [place]       Charge Capture  }Post Session Pain  PT Visit Info  [employer] Portal  MD Guidelines  Scanned Media  Benefits  MyChart    Future Appointments   Date Time Provider [facility]   3/20/2023 10:15 AM [address], [facility]   3/23/2023 10:15 AM [address], [facility]   3/27/2023 10:15 AM [address], [facility]   3/30/2023 10:15 AM [address], [facility]   6/8/2023  1:30 PM MD ELIER XiaoNI GVL [facility]   7/26/2023 10:45 AM EVELYN [facility]OM [facility]

## 2023-03-20 ENCOUNTER — HOSPITAL ENCOUNTER (OUTPATIENT)
Dept: PHYSICAL THERAPY | Age: 73
Setting detail: RECURRING SERIES
Discharge: HOME | End: 2023-03-23
Payer: MEDICARE

## 2023-03-20 PROCEDURE — 97113 AQUATIC THERAPY/EXERCISES: CPT

## 2023-03-20 NOTE — PROGRESS NOTES
Bright Roland  : 1950  Primary: Medicare Part A And B (Medicare)  Secondary: [address] @ [address] [place] [id number]  Phone: [phone]  Fax: [phone] No data recorded  Plan of Care/Certification Expiration Date: 23      PT Visit Info:  Plan of Care/Certification Expiration Date: 23      Visit Count:  8    OUTPATIENT PHYSICAL THERAPY:OP NOTE TYPE: Treatment Note 3/20/2023       Episode  }Appt Desk             Treatment Diagnosis:  Generalized Muscle Weakness (M62.81)  Low Back Pain (M54.5)  Spondylolisthesis, lumbar regionSpondylolisthesis, lumbar region [M43.16]      Medical/Referring Diagnosis:  Spondylolisthesis, lumbar region [M43.16]  Referring Physician:  Familia Kan MD MD Orders:  PT Eval and Treat   Date of Onset:  No data recorded   Allergies:   Duloxetine, Duloxetine hcl, Lactose, Sucralose, Gabapentin, and Nickel  Restrictions/Precautions:  No data recorded  No data recorded   Interventions Planned (Treatment may consist of any combination of the following):    Current Treatment Recommendations: Aquatics; Strengthening; ROM; Balance training; Transfer training; Endurance training     Subjective Comments:   Patient reports feeling better just a little stiff today. Initial:}     3/10Post Session:        2/10  Medications Last Reviewed:  3/20/2023  Updated Objective Findings:  None Today  Treatment   THERAPEUTIC EXERCISE: ( minutes):    Exercises per grid below to improve mobility, strength, and balance. Required minimal visual, verbal, and manual cues to promote proper body alignment, promote proper body posture, and promote proper body mechanics. Progressed resistance and range as indicated. Date:  23 Date:   Date:     Activity/Exercise Parameters Parameters Parameters   Rows 10x10\"     SKTC 30\"x3                                     Aquatic Therapy ( 45 minutes):  Aquatic treatment

## 2023-03-23 ENCOUNTER — HOSPITAL ENCOUNTER (OUTPATIENT)
Dept: PHYSICAL THERAPY | Age: 73
Setting detail: RECURRING SERIES
Discharge: HOME | End: 2023-03-26
Payer: MEDICARE

## 2023-03-23 PROCEDURE — 97113 AQUATIC THERAPY/EXERCISES: CPT

## 2023-03-23 NOTE — PROGRESS NOTES
Adelfo Moralez  : 1950  Primary: Medicare Part A And B (Medicare)  Secondary: [address] @ [address] [place] [address] Smiley [address] [address]  Phone: [phone]  Fax: [phone] No data recorded  Plan of Care/Certification Expiration Date: 23      PT Visit Info:  Plan of Care/Certification Expiration Date: 23      Visit Count:  9    OUTPATIENT PHYSICAL THERAPY:OP NOTE TYPE: Treatment Note 3/23/2023       Episode  }Appt Desk             Treatment Diagnosis:  Generalized Muscle Weakness (M62.81)  Low Back Pain (M54.5)  Spondylolisthesis, lumbar regionSpondylolisthesis, lumbar region [M43.16]      Medical/Referring Diagnosis:  Spondylolisthesis, lumbar region [M43.16]  Referring Physician:  Rajwinder Hogue MD MD Orders:  PT Eval and Treat   Date of Onset:  No data recorded   Allergies:   Duloxetine, Duloxetine hcl, Lactose, Sucralose, Gabapentin, and Nickel  Restrictions/Precautions:  No data recorded  No data recorded   Interventions Planned (Treatment may consist of any combination of the following):    Current Treatment Recommendations: Aquatics; Strengthening; ROM; Balance training; Transfer training; Endurance training     Subjective Comments:   Patient reports feeling better overall. She states she has been doing her exercises consistantly. Initial:}     3/10Post Session:        2/10  Medications Last Reviewed:  3/23/2023  Updated Objective Findings:  None Today  Treatment   THERAPEUTIC EXERCISE: ( minutes):    Exercises per grid below to improve mobility, strength, and balance. Required minimal visual, verbal, and manual cues to promote proper body alignment, promote proper body posture, and promote proper body mechanics. Progressed resistance and range as indicated.    Date:  23 Date:   Date:     Activity/Exercise Parameters Parameters Parameters   Rows 10x10\"     SKTC 30\"x3                                     Aquatic

## 2023-03-27 ENCOUNTER — HOSPITAL ENCOUNTER (OUTPATIENT)
Dept: PHYSICAL THERAPY | Age: 73
Setting detail: RECURRING SERIES
Discharge: HOME | End: 2023-03-30
Payer: MEDICARE

## 2023-03-27 PROCEDURE — 97113 AQUATIC THERAPY/EXERCISES: CPT

## 2023-03-27 NOTE — PROGRESS NOTES
MyChart    Future Appointments   Date Time Provider Anya Mcmillan   3/30/2023 10:15 AM [address] [place] [address]   6/8/2023  1:30 PM Luh Feldman MD [facility]   7/26/2023 10:45 AM EVELYN [facility] ROOM 4 [facility]

## 2023-03-30 ENCOUNTER — HOSPITAL ENCOUNTER (OUTPATIENT)
Dept: PHYSICAL THERAPY | Age: 73
Setting detail: RECURRING SERIES
End: 2023-03-30
Payer: MEDICARE

## 2023-03-30 PROCEDURE — 97113 AQUATIC THERAPY/EXERCISES: CPT

## 2023-03-30 NOTE — PROGRESS NOTES
Jennifer Fisher  : 1950  Primary: Medicare Part A And B (Medicare)  Secondary: [address] @ [address] [place] [address]  Phone: [phone]  Fax: [phone] No data recorded  Plan of Care/Certification Expiration Date: 23      PT Visit Info:  Plan of Care/Certification Expiration Date: 23      Visit Count:  11    OUTPATIENT PHYSICAL THERAPY:OP NOTE TYPE: Treatment Note 3/30/2023       Episode  }Appt Desk             Treatment Diagnosis:  Generalized Muscle Weakness (M62.81)  Low Back Pain (M54.5)  Spondylolisthesis, lumbar regionSpondylolisthesis, lumbar region [M43.16]      Medical/Referring Diagnosis:  Spondylolisthesis, lumbar region [M43.16]  Referring Physician:  Jessenia Romero MD MD Orders:  PT Eval and Treat   Date of Onset:  No data recorded   Allergies:   Duloxetine, Duloxetine hcl, Lactose, Sucralose, Gabapentin, and Nickel  Restrictions/Precautions:  No data recorded  No data recorded   Interventions Planned (Treatment may consist of any combination of the following):    Current Treatment Recommendations: Aquatics; Strengthening; ROM; Balance training; Transfer training; Endurance training     Subjective Comments:   Patient reports feeling better overall. Patient plans to start walking more and doing somethings in her yard. Initial:}     2/10Post Session:        1/10  Medications Last Reviewed:  3/30/2023  Updated Objective Findings:  None Today  Treatment   THERAPEUTIC EXERCISE: ( minutes):    Exercises per grid below to improve mobility, strength, and balance. Required minimal visual, verbal, and manual cues to promote proper body alignment, promote proper body posture, and promote proper body mechanics. Progressed resistance and range as indicated.    Date:  23 Date:   Date:     Activity/Exercise Parameters Parameters Parameters   Rows 10x10\"     SKTC 30\"x3 Aquatic Therapy ( 45 minutes): Aquatic treatment performed per flow grid for Decreased muscle strength. Cues provided for -. Assistance by therapist provided for -. Patient has difficulty with -. Aquatic Exercise Log       Date  3/20 Date  3/23 Date  3/27 Date  3/30   Activity/ Exercise Parameters  `    Walking forward 5 min 5 min 5 min 5 min   Walking backward   5 min 5 min   Walking sideways 5 min 5 min 5 min 5 min     Marching         Goose Step         Tip toes         Heels         Lunges       Side step squats       LE Exercises         Hip Flex/Ext 2 x 10 2 x 10 2 x 10 2 x 10     Hip Abd/Add 2 x 10 2 x 10 2 x 10 2 x 10     Hip  flex/knee ext 2 x 10 2 x 10 2 x 10 2 x 10     Calf raises         Knee Flex         Squats         SLR 2 x 10 2 x 10 2 x 10 2 x 10     Step Ups       UE Exercises         Squeeze In 2 x 10 2 x 10 2 x 10 2 x 10     Push Down 2 x 10 2 x 10       Pull Down         Scapula /retraction 2 x 10 2 x 10     Rows/Press outs 2 x 10 2 x 10 2 x 10 2 x 10   Shoulder shrug 2 x 10 2 x 10       Trunk Rotation       Deep H2O/ Noodles         Stabilization         Arms only         Legs only       GroundCntrl walk       Lower abdominal   work          Cardio         Jogging         bicycle    X 5 min     Stretches         Hamstrings         Heelcords 3 x 30 sec  3 x 30 sec      Piriformis         Neck             Treatment/Session Summary:    Treatment Assessment: Patient tolerated treatment well. Patient hopes to become independent with all exercises in the next 2-3 weeks. Communication/Consultation:   Faxed IE to MD  Equipment provided today:  HEP  Recommendations/Intent for next treatment session: Next visit will focus on aquatics.     Total Treatment Billable Duration:  45 minutes  Time In: 8231  Time Out: 58 [place], [place]       Charge Capture  }Post Session Pain  PT Visit Info  [facility] Portal  MD Guidelines  Scanned Media  Benefits

## 2023-06-08 ENCOUNTER — OFFICE VISIT (OUTPATIENT)
Dept: NEUROLOGY | Age: 73
End: 2023-06-08
Payer: MEDICARE

## 2023-06-08 VITALS
HEIGHT: 63 IN | WEIGHT: 149.4 LBS | DIASTOLIC BLOOD PRESSURE: 68 MMHG | HEART RATE: 51 BPM | SYSTOLIC BLOOD PRESSURE: 94 MMHG | BODY MASS INDEX: 26.47 KG/M2 | OXYGEN SATURATION: 98 %

## 2023-06-08 DIAGNOSIS — R41.3 MEMORY DIFFICULTIES: Primary | ICD-10-CM

## 2023-06-08 DIAGNOSIS — G60.8 SENSORY POLYNEUROPATHY: ICD-10-CM

## 2023-06-08 PROCEDURE — 3017F COLORECTAL CA SCREEN DOC REV: CPT | Performed by: PSYCHIATRY & NEUROLOGY

## 2023-06-08 PROCEDURE — G8399 PT W/DXA RESULTS DOCUMENT: HCPCS | Performed by: PSYCHIATRY & NEUROLOGY

## 2023-06-08 PROCEDURE — 99214 OFFICE O/P EST MOD 30 MIN: CPT | Performed by: PSYCHIATRY & NEUROLOGY

## 2023-06-08 PROCEDURE — 1090F PRES/ABSN URINE INCON ASSESS: CPT | Performed by: PSYCHIATRY & NEUROLOGY

## 2023-06-08 PROCEDURE — 1123F ACP DISCUSS/DSCN MKR DOCD: CPT | Performed by: PSYCHIATRY & NEUROLOGY

## 2023-06-08 PROCEDURE — G8427 DOCREV CUR MEDS BY ELIG CLIN: HCPCS | Performed by: PSYCHIATRY & NEUROLOGY

## 2023-06-08 PROCEDURE — G8417 CALC BMI ABV UP PARAM F/U: HCPCS | Performed by: PSYCHIATRY & NEUROLOGY

## 2023-06-08 PROCEDURE — 1036F TOBACCO NON-USER: CPT | Performed by: PSYCHIATRY & NEUROLOGY

## 2023-06-08 RX ORDER — MECLIZINE HYDROCHLORIDE 25 MG/1
TABLET ORAL
COMMUNITY

## 2023-06-08 RX ORDER — ORPHENADRINE CITRATE 100 MG/1
TABLET, EXTENDED RELEASE ORAL
COMMUNITY

## 2023-06-08 RX ORDER — HYDROXYCHLOROQUINE SULFATE 200 MG/1
TABLET, FILM COATED ORAL
COMMUNITY
Start: 2022-12-21

## 2023-06-08 ASSESSMENT — ENCOUNTER SYMPTOMS: BACK PAIN: 1

## 2023-06-08 NOTE — PROGRESS NOTES
Neurologic Exam     Mental Status   Oriented to person, place, and time. Concentration: normal.   Speech: speech is normal   Level of consciousness: alert  Knowledge: good. Normal comprehension. Cranial Nerves     CN II   Visual fields full to confrontation. Visual acuity: normal  Right visual field deficit: none  Left visual field deficit: none     CN III, IV, VI   Pupils are equal, round, and reactive to light. Extraocular motions are normal.   Right pupil: Size: 3 mm. Shape: regular. Left pupil: Size: 3 mm. Shape: regular. Nystagmus: none   Diplopia: none  Ophthalmoparesis: none  Upgaze: normal    CN V   Facial sensation intact. CN VII   Facial expression full, symmetric. CN VIII   CN VIII normal.     CN IX, X   CN IX normal.   CN X normal.     CN XI   CN XI normal.     CN XII   CN XII normal.     Motor Exam   Muscle bulk: normal  Overall muscle tone: normal  Right arm pronator drift: absent  Left arm pronator drift: absent    Strength   Strength 5/5 throughout. Finger tapping normal     Sensory Exam   Light touch normal.   Vibration normal.     Gait, Coordination, and Reflexes     Gait  Gait: normal    Coordination   Romberg: negative  Finger to nose coordination: normal    Tremor   Resting tremor: absent  Intention tremor: absent  Action tremor: absent    Reflexes   Right brachioradialis: 2+  Left brachioradialis: 2+  Right biceps: 2+  Left biceps: 2+  Right triceps: 2+  Left triceps: 2+  Right patellar: 2+  Left patellar: 2+  Right achilles: 0  Left achilles: 0        Assessment / Plan:    Raul Vilchis was seen today for memory loss and numbness. Diagnoses and all orders for this visit:    Memory difficulties    Sensory polyneuropathy    We have reviewed the previous work-up for memory difficulties, brain MRI showed small infarction in the cortex, CTS neg. We will obtain a second EEG to look for any seizure activities. Patient will return for MoCA.   Start Aricept after above

## 2023-06-09 ASSESSMENT — VISUAL ACUITY: VA_NORMAL: 1

## 2023-06-27 ENCOUNTER — OFFICE VISIT (OUTPATIENT)
Dept: NEUROLOGY | Age: 73
End: 2023-06-27

## 2023-06-27 DIAGNOSIS — R41.3 MEMORY DIFFICULTIES: ICD-10-CM

## 2023-06-27 DIAGNOSIS — R41.82 ALTERED MENTAL STATUS, UNSPECIFIED ALTERED MENTAL STATUS TYPE: Primary | ICD-10-CM

## 2023-07-10 ENCOUNTER — TELEPHONE (OUTPATIENT)
Dept: NEUROLOGY | Age: 73
End: 2023-07-10

## 2023-07-10 DIAGNOSIS — R94.01 ABNORMAL EEG: Primary | ICD-10-CM

## 2023-07-10 DIAGNOSIS — R41.82 ALTERED MENTAL STATUS, UNSPECIFIED ALTERED MENTAL STATUS TYPE: ICD-10-CM

## 2023-07-10 RX ORDER — LEVETIRACETAM 500 MG/1
TABLET ORAL
Qty: 60 TABLET | Refills: 3 | Status: SHIPPED | OUTPATIENT
Start: 2023-07-10

## 2023-07-10 NOTE — TELEPHONE ENCOUNTER
Called and discussed EEG result, has memory difficulties, short term, brain fog on a daily basis, often hot drink or food triggered. Takes nortriptyline 20 mg for depression with [facility], advised her to be off nortriptyline, and try different antidepressant. Start Keppra 500 mg bid. Requested Prescriptions     Signed Prescriptions Disp Refills    levETIRAcetam (KEPPRA) 500 MG tablet 60 tablet 3     Si/2 bid x 2 weeks, then 1 bid.      Authorizing Provider: Eloise Harrington

## 2023-07-26 ENCOUNTER — HOSPITAL ENCOUNTER (OUTPATIENT)
Dept: MAMMOGRAPHY | Age: 73
Discharge: HOME | End: 2023-07-29
Payer: MEDICARE

## 2023-07-26 DIAGNOSIS — N63.10 MASS OF RIGHT BREAST, UNSPECIFIED QUADRANT: ICD-10-CM

## 2023-07-26 DIAGNOSIS — Z12.31 SCREENING MAMMOGRAM, ENCOUNTER FOR: ICD-10-CM

## 2023-07-26 PROCEDURE — 77063 BREAST TOMOSYNTHESIS BI: CPT

## 2023-07-26 PROCEDURE — 77067 SCR MAMMO BI INCL CAD: CPT

## 2023-08-03 ENCOUNTER — OFFICE VISIT (OUTPATIENT)
Age: 73
End: 2023-08-03

## 2023-08-03 VITALS
DIASTOLIC BLOOD PRESSURE: 70 MMHG | BODY MASS INDEX: 26.05 KG/M2 | HEIGHT: 63 IN | HEART RATE: 68 BPM | WEIGHT: 147 LBS | SYSTOLIC BLOOD PRESSURE: 120 MMHG

## 2023-08-03 DIAGNOSIS — I34.1 MVP (MITRAL VALVE PROLAPSE): ICD-10-CM

## 2023-08-03 DIAGNOSIS — I72.9 ANEURYSM (HCC): ICD-10-CM

## 2023-08-03 DIAGNOSIS — E78.5 DYSLIPIDEMIA: ICD-10-CM

## 2023-08-03 DIAGNOSIS — I25.10 CORONARY ARTERY DISEASE INVOLVING NATIVE CORONARY ARTERY OF NATIVE HEART WITHOUT ANGINA PECTORIS: Primary | ICD-10-CM

## 2023-08-03 RX ORDER — PREGABALIN 150 MG/1
150 CAPSULE ORAL 2 TIMES DAILY
COMMUNITY
Start: 2023-06-29

## 2023-08-03 NOTE — PROGRESS NOTES
[address] [facility], [facility], Brennan Campos  PHONE: [phone]         NAME:  Jelani Fernández  : 1950  MRN: [id number]       SUBJECTIVE:   Jelani Fernández is a 68 y.o. female seen for a follow up visit regarding the following:     Chief Complaint   Patient presents with    Coronary Artery Disease       HPI:  Here for eval of CAD    Ca score of 101 in . [facility] Echo 2018: normal EF, mild to mod MR and MVP. NST 10/2021: low risk stress test.   Echo 10/2021: normal EF, mild AI. Mild MR.        struggling with neuropathy, worsening, may have Sjogren's. Struggling with dizziness at times, seeing neurology. Memory not as good now. No CP, pressure. No new angina. Patient denies recent history of orthopnea, PND, excessive dizziness and/or syncope. Past Medical History, Past Surgical History, Family history, Social History, and Medications were all reviewed with the patient today and updated as necessary. Current Outpatient Medications   Medication Sig Dispense Refill    pregabalin (LYRICA) 150 MG capsule Take 1 capsule by mouth 2 times daily. levETIRAcetam (KEPPRA) 500 MG tablet 1/2 bid x 2 weeks, then 1 bid. 60 tablet 3    hydroxychloroquine (PLAQUENIL) 200 MG tablet hydroxychloroquine 200 mg tablet   TAKE 1 TABLET BY MOUTH ONCE DAILY FOR 90 DAYS      nortriptyline (PAMELOR) 10 MG capsule Take 2 capsules by mouth at bedtime      zolpidem (AMBIEN) 5 MG tablet TAKE 1 TABLET BY MOUTH AT BEDTIME      nystatin (MYCOSTATIN) 645191 UNIT/GM cream Apply topically 2 times daily 30 g 5    triamcinolone (KENALOG) 0.1 % cream Apply topically 2 times daily 45 g 5    Rosuvastatin Calcium (CRESTOR PO) Take by mouth      hydrocortisone 2.5 % cream INSERT CREAM INTO RECTUM FOUR TIMES DAILY AS DIRECTED      Lifitegrast 5 % SOLN PRN    xiidra      LORazepam (ATIVAN) 0.5 MG tablet Take 0.5-1 tablets by mouth 2 times daily as needed.       polyethylene glycol

## 2023-08-21 ENCOUNTER — HOSPITAL ENCOUNTER (OUTPATIENT)
Dept: PHYSICAL THERAPY | Age: 73
Setting detail: RECURRING SERIES
Discharge: HOME | End: 2023-08-24
Payer: MEDICARE

## 2023-08-21 PROCEDURE — 97110 THERAPEUTIC EXERCISES: CPT

## 2023-08-21 PROCEDURE — 97161 PT EVAL LOW COMPLEX 20 MIN: CPT

## 2023-08-21 ASSESSMENT — PAIN SCALES - GENERAL: PAINLEVEL_OUTOF10: 7

## 2023-08-21 NOTE — THERAPY EVALUATION
continue with at home\"  Initial:     7/10 Post Session:     7/10  Past Medical History/Comorbidities:   Ms. Nesha Busch  has a past medical history of Acne rosacea, Agatston CAC score 100-199, Cervical spondylosis, Chronic insomnia, Chronic thoracic spine pain, Colon polyps, Depression, major, single episode, mild ([address]), Dyslipidemia, GERD (gastroesophageal reflux disease), Low back pain with bilateral sciatica, Osteoarthritis, generalized, Sensory neuropathy, Sensory polyneuropathy, Sigmoid diverticulosis, and Small intestinal bacterial overgrowth. Ms. Nesha Busch  has a past surgical history that includes  section (); Colonoscopy (2016); and Upper gastrointestinal endoscopy (, ). Social History/Living Environment:   Lives With: Alone  Home Layout: One level  Home Access: Stairs to enter without rails  Entrance Stairs - Number of Steps: 1     Prior Level of Function/Work/Activity:   Occupation: Part time employment  Type of Occupation: Sales         Learning:   Does the patient/guardian have any barriers to learning?: No barriers  What is the preferred language of the patient/guardian?: English  How does the patient/guardian prefer to learn new concepts?: Demonstration       Fall Risk Scale:    Botello Total Score: 0  Botello Fall Risk: Low (0-24)       Dominant Side:  right handed      OBJECTIVE   Observation/Posture: Kyphoscoliosis,  significant forward head with negative cervical lordosis, left shoulder elevated, left iliac crest elevated  Palpation: tender thoracic paraspinals  ROM:   Date:  23       Right Left   Shoulder flex 130 130   Abduction 130 130   Shoulder ER WNL WNL        Lumbar flex 75%    Rotation 75% 50%   Side bending 50% 50%      Strength:   Date:  23       Right Left   LE  grossly WFL WFL   UE grossly [facility] WF          Special Tests: NA  ASSESSMENT   Initial Assessment:  On exam patient presents with postural deviations which results in decreased shoulder ROM  Problem

## 2023-08-23 NOTE — PROGRESS NOTES
Ta bracing 10x hold 5 sec     Seated hamstring stretch 30x     Supine march 10x     Dead bug 10x             [employer]Pads Portal Access Code: [email or web address]  URL: [email or web address]  Date: 08/24/2023  Prepared by: Kristy Cary    Exercises  - Seated Hamstring Stretch  - 1 x daily - 7 x weekly - 3 reps - 30 hold  - Supine Transversus Abdominis Bracing   - 1 x daily - 7 x weekly  - Supine March  - 1 x daily - 7 x weekly - 1 sets - 10 reps  - Supine Dead Bug with Leg Extension  - 1 x daily - 7 x weekly - 1 sets - 10 reps  - Bird Dog  - 1 x daily - 7 x weekly - 10 reps - 3 hold  - Supine Bridge  - 1 x daily - 7 x weekly - 1 sets - 10 reps - 3 hold  - Seated Shoulder Horizontal Abduction with Resistance  - 1 x daily - 7 x weekly - 1 sets - 5-10 reps  Treatment/Session Summary:    >Treatment Assessment:  Tolerated treatment.  Modified and reviewed HEP patient deomsntraing good technique with exercises  Communication/Consultation:   Reviewed home exercises, POC, goals  Equipment provided today:  None  Recommendations/Intent for next treatment session: Next visit will focus on manual therapy, scapular and core stabilization exercises, instruction in body mechanics and HEP.    >Total Treatment Billable Duration:  35 minutes (20 minutes eval, 15 minutes TE)  Time In: 1515  Time Out: 1600    Toney Ruiz, PT       Charge Capture  }Post Session Pain  PT Visit Info  [employer] Portal  MD Guidelines  Scanned Media  Benefits  MyChart    Future Appointments   Date Time Provider [address]   8/29/2023 10:15 AM Toney Ruiz, PT [facility]   12/7/2023  3:00 PM Vero Mcgraw MD BS[facility]

## 2023-08-29 ENCOUNTER — HOSPITAL ENCOUNTER (OUTPATIENT)
Dept: PHYSICAL THERAPY | Age: 73
Setting detail: RECURRING SERIES
Discharge: HOME | End: 2023-09-01
Payer: MEDICARE

## 2023-08-29 PROCEDURE — 97110 THERAPEUTIC EXERCISES: CPT

## 2023-08-29 ASSESSMENT — PAIN SCALES - GENERAL: PAINLEVEL_OUTOF10: 7

## 2023-08-29 NOTE — PROGRESS NOTES
Magalis Acharya  : 1950  Primary: Medicare Part A And B (Medicare)  Secondary: Ernie Ching [place] [address]  [address]  [place] [address]  Phone: [phone]  Fax: [phone] Plan Frequency: 1-2 week for 3-4 weeks    Plan of Care/Certification Expiration Date: 23      >PT Visit Info:  Plan Frequency: 1-2 week for 3-4 weeks  Plan of Care/Certification Expiration Date: 23  Total # of Visits to Date: 2      Visit Count:  2    OUTPATIENT PHYSICAL THERAPY:OP NOTE TYPE: OP Note Type: Treatment Note 2023       Episode  }Appt Desk             Treatment Diagnosis:  Pain in thoracic spine (M54.6)  Medical/Referring Diagnosis:  Pain in thoracic spine [M54.6]  Referring Physician:  TESS Gallagher CNP, MD Orders:  PT Eval and Treat   Date of Onset:  No data recorded   Allergies:   Duloxetine, Duloxetine hcl, Lactose, Sucralose, Gabapentin, and Nickel  Restrictions/Precautions:  No data recordedNo data recorded     Interventions Planned (Treatment may consist of any combination of the following):    Current Treatment Recommendations: Strengthening; ROM; Neuromuscular re-education; Pain management; Home exercise program; Manual; ADL/Self-care training; Safety education & training; Patient/Caregiver education & training     >Subjective Comments:  Patient reports her neuropathy is worse today. Does well on level ground, walked this morning for 30 minutes  >Initial:     7/10>Post Session:       7/10  Medications Last Reviewed:  2023  Updated Objective Findings:  None Today  Treatment   THERAPEUTIC EXERCISE: (45 minutes):    Exercises per grid below to improve mobility. Required minimal visual and verbal cues to promote proper body alignment and promote proper body posture. Progressed range and complexity of movement as indicated.    Date:  23 Date:  23 Date:     Activity/Exercise Parameters Parameters Parameters   HEP/patient

## 2023-09-06 ENCOUNTER — HOSPITAL ENCOUNTER (OUTPATIENT)
Dept: PHYSICAL THERAPY | Age: 73
Setting detail: RECURRING SERIES
Discharge: HOME | End: 2023-09-09
Payer: MEDICARE

## 2023-09-06 PROCEDURE — 97140 MANUAL THERAPY 1/> REGIONS: CPT

## 2023-09-06 PROCEDURE — 97110 THERAPEUTIC EXERCISES: CPT

## 2023-09-06 ASSESSMENT — PAIN SCALES - GENERAL: PAINLEVEL_OUTOF10: 3

## 2023-09-06 NOTE — PROGRESS NOTES
and core stabilization exercises, instruction in body mechanics and HEP.    >Total Treatment Billable Duration:  45 minutes (30 minutes TE, 15 minutes manual)  Time In: 0945  Time Out: 1030    Toney Ruiz, PT       Charge Capture  }Post Session Pain  PT Visit Info  Med[employer] Portal  MD Guidelines  Scanned Media  Benefits  MyChart    Future Appointments   Date Time Provider [address]   9/14/2023  8:45 AM [facility]   9/21/2023 10:15 AM Toney Ruiz, PT [facility]   12/13/2023  3:30 PM Brendon Terry MD BS[facility]

## 2023-09-14 ENCOUNTER — HOSPITAL ENCOUNTER (OUTPATIENT)
Dept: PHYSICAL THERAPY | Age: 73
Setting detail: RECURRING SERIES
Discharge: HOME | End: 2023-09-17
Payer: MEDICARE

## 2023-09-14 PROCEDURE — 97110 THERAPEUTIC EXERCISES: CPT

## 2023-09-14 PROCEDURE — 97140 MANUAL THERAPY 1/> REGIONS: CPT

## 2023-09-14 ASSESSMENT — PAIN SCALES - GENERAL: PAINLEVEL_OUTOF10: 3

## 2023-09-14 NOTE — PROGRESS NOTES
Mary Marie  : 1950  Primary: Medicare Part A And B (Medicare)  Secondary: Gigi [facility]en[facility]ain [facility]  [address]  [address] [place] [address]  Phone: [phone]  Fax: [phone] Plan Frequency: 1-2 week for 3-4 weeks    Plan of Care/Certification Expiration Date: 23      >PT Visit Info:  Plan Frequency: 1-2 week for 3-4 weeks  Plan of Care/Certification Expiration Date: 23  Total # of Visits to Date: 4      Visit Count:  4    OUTPATIENT PHYSICAL THERAPY:OP NOTE TYPE: OP Note Type: Treatment Note 2023       Episode  }Appt Desk             Treatment Diagnosis:  Pain in thoracic spine (M54.6)  Medical/Referring Diagnosis:  Pain in thoracic spine [M54.6]  Referring Physician:  TESS Tan CNP, MD Orders:  PT Eval and Treat   Date of Onset:  No data recorded   Allergies:   Duloxetine, Duloxetine hcl, Lactose, Sucralose, Gabapentin, and Nickel  Restrictions/Precautions:  No data recordedNo data recorded     Interventions Planned (Treatment may consist of any combination of the following):    Current Treatment Recommendations: Strengthening; ROM; Neuromuscular re-education; Pain management; Home exercise program; Manual; ADL/Self-care training; Safety education & training; Patient/Caregiver education & training     >Subjective Comments:  Reports feeling sore in her mid back today  >Initial:     3/10>Post Session:       2/10  Medications Last Reviewed:  2023  Updated Objective Findings:  None Today  Treatment   MANUAL THERAPY: (20 minutes):   Joint mobilization and Soft tissue mobilization was utilized and necessary because of the patient's restricted joint motion and restricted motion of soft tissue.          Left hip lateral distraction and inferior glides grade 3        Left lumbo pelvic distraction in sidelying         STM/MFR of left upper traps and  periscapular muscles  THERAPEUTIC EXERCISE: (25 minutes):    Exercises per grid

## 2023-09-21 ENCOUNTER — HOSPITAL ENCOUNTER (OUTPATIENT)
Dept: PHYSICAL THERAPY | Age: 73
Setting detail: RECURRING SERIES
Discharge: HOME | End: 2023-09-24
Payer: MEDICARE

## 2023-09-21 PROCEDURE — 97110 THERAPEUTIC EXERCISES: CPT

## 2023-09-21 ASSESSMENT — PAIN SCALES - GENERAL: PAINLEVEL_OUTOF10: 5

## 2023-09-21 NOTE — THERAPY DISCHARGE
Reji Lopez  : 1950  Primary: Medicare Part A And B (Medicare)  [facility] [address] [facility]  [address] Martha Velasquez [address] [place] [address]  Phone: [phone]  Fax: [phone] Plan Frequency: 1-2 week for 3-4 weeks    Plan of Care/Certification Expiration Date: 23      PT Visit Info:  Plan Frequency: 1-2 week for 3-4 weeks  Plan of Care/Certification Expiration Date: 23  Total # of Visits to Date: 4      Visit Count:  5                OUTPATIENT PHYSICAL THERAPY:             OP NOTE TYPE: Discharge Summary 2023               Episode (Thoracic back pain) Appt Desk         Treatment Diagnosis:  Pain in thoracic spine (M54.6)  Medical/Referring Diagnosis:  Pain in thoracic spine [M54.6]  Referring Physician:  TESS Sheikh CNP, MD Orders:  PT Eval and Treat   Return MD Appt:  TBD  Date of Onset:       Allergies:  Duloxetine, Duloxetine hcl, Lactose, Sucralose, Gabapentin, and Nickel  Restrictions/Precautions:           Medications Last Reviewed:  2023     SUBJECTIVE   History of Injury/Illness (Reason for Referral):  Pain now in mid back intermittent in nature usually triggered with grooming hair, pain shifts side to side.   Low back feels better  Patient Stated Goal(s):  \"wants to learn activities to adapt to continue with at home\"  Reports no change in symptoms, but feels exercises have been helpful  Initial:      /10 Post Session:      /10     OBJECTIVE   Observation/Posture: Kyphoscoliosis,  significant forward head with negative cervical lordosis, left shoulder elevated, left iliac crest elevated  Palpation: tender thoracic paraspinals  ROM:   Date:  23    Date:  23     Right Left Right Left   Shoulder flex 130 130 140 140   Abduction 130 130 135 135   Shoulder ER WNL WNL            Lumbar flex 75%  100    Rotation 75% 50% 75% 75%   Side bending 50% 50% 75% 75%      Strength:   Date:  23    Date:  23

## 2023-09-21 NOTE — PROGRESS NOTES
Gabrielle Murray  : 1950  Primary: Medicare Part A And B (Medicare)  Secondary: Lucita Brewer O [facility]  [address]  [address]  [place] [address]  Phone: [phone]  Fax: [phone] Plan Frequency: 1-2 week for 3-4 weeks    Plan of Care/Certification Expiration Date: 23      >PT Visit Info:  Plan Frequency: 1-2 week for 3-4 weeks  Plan of Care/Certification Expiration Date: 23  Total # of Visits to Date: 4      Visit Count:  5    OUTPATIENT PHYSICAL THERAPY:OP NOTE TYPE: OP Note Type: Treatment Note 2023       Episode  }Appt Desk             Treatment Diagnosis:  Pain in thoracic spine (M54.6)  Medical/Referring Diagnosis:  Pain in thoracic spine [M54.6]  Referring Physician:  TESS Verma CNP, MD Orders:  PT Eval and Treat   Date of Onset:  No data recorded   Allergies:   Duloxetine, Duloxetine hcl, Lactose, Sucralose, Gabapentin, and Nickel  Restrictions/Precautions:  No data recordedNo data recorded     Interventions Planned (Treatment may consist of any combination of the following):    Current Treatment Recommendations: Strengthening; ROM; Neuromuscular re-education; Pain management; Home exercise program; Manual; ADL/Self-care training; Safety education & training; Patient/Caregiver education & training     >Subjective Comments:  Patient reports that the pain is mostly in her mid back region; mostly unchanged in nature  >Initial:     5/10>Post Session:       5/10  Medications Last Reviewed:  2023  Updated Objective Findings:  None Today  Treatment   MANUAL THERAPY: (0 minutes):   Joint mobilization and Soft tissue mobilization was utilized and necessary because of the patient's restricted joint motion and restricted motion of soft tissue.          Left hip lateral distraction and inferior glides grade 3        Left lumbo pelvic distraction in sidelying         STM/MFR of left upper traps and  periscapular muscles  THERAPEUTIC

## 2023-09-28 ENCOUNTER — APPOINTMENT (RX ONLY)
Dept: URBAN - METROPOLITAN AREA CLINIC 329 | Facility: CLINIC | Age: 73
Setting detail: DERMATOLOGY
End: 2023-09-28

## 2023-09-28 DIAGNOSIS — Z85.828 PERSONAL HISTORY OF OTHER MALIGNANT NEOPLASM OF SKIN: ICD-10-CM | Status: RESOLVED

## 2023-09-28 DIAGNOSIS — L81.4 OTHER MELANIN HYPERPIGMENTATION: ICD-10-CM | Status: STABLE

## 2023-09-28 DIAGNOSIS — L82.1 OTHER SEBORRHEIC KERATOSIS: ICD-10-CM | Status: STABLE

## 2023-09-28 DIAGNOSIS — D18.0 HEMANGIOMA: ICD-10-CM | Status: STABLE

## 2023-09-28 DIAGNOSIS — D22 MELANOCYTIC NEVI: ICD-10-CM | Status: STABLE

## 2023-09-28 PROBLEM — D22.5 MELANOCYTIC NEVI OF TRUNK: Status: ACTIVE | Noted: 2023-09-28

## 2023-09-28 PROBLEM — D18.01 HEMANGIOMA OF SKIN AND SUBCUTANEOUS TISSUE: Status: ACTIVE | Noted: 2023-09-28

## 2023-09-28 PROCEDURE — ? COUNSELING

## 2023-09-28 PROCEDURE — 99213 OFFICE O/P EST LOW 20 MIN: CPT

## 2023-09-28 ASSESSMENT — LOCATION ZONE DERM: LOCATION ZONE: TRUNK

## 2023-09-28 ASSESSMENT — LOCATION DETAILED DESCRIPTION DERM
LOCATION DETAILED: LEFT MID-UPPER BACK
LOCATION DETAILED: LEFT MEDIAL UPPER BACK
LOCATION DETAILED: LEFT INFERIOR MEDIAL UPPER BACK

## 2023-09-28 ASSESSMENT — LOCATION SIMPLE DESCRIPTION DERM: LOCATION SIMPLE: LEFT UPPER BACK

## 2023-10-04 ENCOUNTER — OFFICE VISIT (OUTPATIENT)
Dept: NEUROLOGY | Age: 73
End: 2023-10-04

## 2023-10-04 DIAGNOSIS — R94.01 ABNORMAL EEG: ICD-10-CM

## 2023-10-04 DIAGNOSIS — R41.82 ALTERED MENTAL STATUS, UNSPECIFIED ALTERED MENTAL STATUS TYPE: Primary | ICD-10-CM

## 2023-11-17 ENCOUNTER — OFFICE VISIT (OUTPATIENT)
Dept: NEUROLOGY | Age: 73
End: 2023-11-17
Payer: MEDICARE

## 2023-11-17 VITALS
WEIGHT: 150.8 LBS | HEART RATE: 87 BPM | DIASTOLIC BLOOD PRESSURE: 72 MMHG | HEIGHT: 63 IN | SYSTOLIC BLOOD PRESSURE: 105 MMHG | OXYGEN SATURATION: 96 % | BODY MASS INDEX: 26.72 KG/M2

## 2023-11-17 DIAGNOSIS — R90.89 ABNORMAL FINDING ON MRI OF BRAIN: ICD-10-CM

## 2023-11-17 DIAGNOSIS — G60.8 SENSORY POLYNEUROPATHY: Primary | ICD-10-CM

## 2023-11-17 DIAGNOSIS — R41.3 MEMORY DIFFICULTIES: ICD-10-CM

## 2023-11-17 DIAGNOSIS — G47.09 OTHER INSOMNIA: ICD-10-CM

## 2023-11-17 DIAGNOSIS — R56.9 SEIZURE-LIKE ACTIVITY (HCC): ICD-10-CM

## 2023-11-17 DIAGNOSIS — R20.2 PARESTHESIA OF SKIN: ICD-10-CM

## 2023-11-17 DIAGNOSIS — Z79.899 ENCOUNTER FOR MEDICATION MANAGEMENT: ICD-10-CM

## 2023-11-17 DIAGNOSIS — G44.219 EPISODIC TENSION-TYPE HEADACHE, NOT INTRACTABLE: ICD-10-CM

## 2023-11-17 DIAGNOSIS — M54.2 CERVICALGIA: ICD-10-CM

## 2023-11-17 PROCEDURE — G8399 PT W/DXA RESULTS DOCUMENT: HCPCS | Performed by: PSYCHIATRY & NEUROLOGY

## 2023-11-17 PROCEDURE — 1123F ACP DISCUSS/DSCN MKR DOCD: CPT | Performed by: PSYCHIATRY & NEUROLOGY

## 2023-11-17 PROCEDURE — 1036F TOBACCO NON-USER: CPT | Performed by: PSYCHIATRY & NEUROLOGY

## 2023-11-17 PROCEDURE — 3017F COLORECTAL CA SCREEN DOC REV: CPT | Performed by: PSYCHIATRY & NEUROLOGY

## 2023-11-17 PROCEDURE — G8427 DOCREV CUR MEDS BY ELIG CLIN: HCPCS | Performed by: PSYCHIATRY & NEUROLOGY

## 2023-11-17 PROCEDURE — 1090F PRES/ABSN URINE INCON ASSESS: CPT | Performed by: PSYCHIATRY & NEUROLOGY

## 2023-11-17 PROCEDURE — 99215 OFFICE O/P EST HI 40 MIN: CPT | Performed by: PSYCHIATRY & NEUROLOGY

## 2023-11-17 PROCEDURE — G8417 CALC BMI ABV UP PARAM F/U: HCPCS | Performed by: PSYCHIATRY & NEUROLOGY

## 2023-11-17 PROCEDURE — G8484 FLU IMMUNIZE NO ADMIN: HCPCS | Performed by: PSYCHIATRY & NEUROLOGY

## 2023-11-17 RX ORDER — NORTRIPTYLINE HYDROCHLORIDE 25 MG/1
50 CAPSULE ORAL NIGHTLY
Qty: 180 CAPSULE | Refills: 1 | Status: SHIPPED | OUTPATIENT
Start: 2023-11-17 | End: 2024-05-15

## 2023-11-17 NOTE — PROGRESS NOTES
[facility] NEUROLOGY NOTE    Patient: Erin Weeks  Physician: Beltran Daniel MD    CC:   Chief Complaint   Patient presents with    Other     Establishing care    Follow-up     PCP: TESS Frederick - CNP   Referring Provider: No ref. provider found     History of Present Illness:     Erin Weeks is a 68 y.o. 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 Patient denied ever experiencing a \"full-blown seizure\", does not strongly believe she has epilepsy, denies auras, denies FIAS, previously thought to have possible focal seizures causing intermittent cognitive changes and memory difficulty, however past EEGs have been normal. Routine EEG on 10/4/2023, was normal.  MRI brain shows a small area of signal abnormality in the right insula and right frontal operculum likely representing sequela of remote infarct. Endorses some difficulties with word finding, trouble with names, forgetful of things to do and needs to write things down. No significant change in memory from last visit. TSH 3.574 , A1C is 5.5, B12 was normal in 2020. Sleep is irregular, difficulty falling and staying asleep. Patient reports having difficulty falling asleep and staying asleep. Patient also develops 1-2 tension type headaches per week without significant nausea or photophobia. She does not utilize NSAIDs. Seizure risk factors:  Developmental history: normal  Febrile seizures: no  FMHx of seizures: none  TBI w/ LOC: no  CNS infections: no  Alcohol use: no   Hx of drug use: no  Hx of Status Epilepticus: no    Current ASM at start of this visit:  BID    Review of Systems:   Review of Systems   Constitutional:  Negative for chills and fever.

## 2023-11-20 PROBLEM — R56.9 SEIZURE-LIKE ACTIVITY (HCC): Status: ACTIVE | Noted: 2023-11-20

## 2023-11-20 ASSESSMENT — ENCOUNTER SYMPTOMS
TROUBLE SWALLOWING: 0
NAUSEA: 0
ABDOMINAL PAIN: 0
SHORTNESS OF BREATH: 0
EYE PAIN: 0
DIARRHEA: 0
COUGH: 0
SORE THROAT: 0

## 2024-06-03 ENCOUNTER — OFFICE VISIT (OUTPATIENT)
Dept: NEUROLOGY | Age: 74
End: 2024-06-03
Payer: MEDICARE

## 2024-06-03 VITALS
HEART RATE: 80 BPM | SYSTOLIC BLOOD PRESSURE: 106 MMHG | DIASTOLIC BLOOD PRESSURE: 74 MMHG | WEIGHT: 158 LBS | HEIGHT: 63 IN | BODY MASS INDEX: 28 KG/M2

## 2024-06-03 DIAGNOSIS — R20.2 PARESTHESIA OF SKIN: ICD-10-CM

## 2024-06-03 DIAGNOSIS — G89.29 CHRONIC PERIPHERAL NEUROPATHIC PAIN: Primary | ICD-10-CM

## 2024-06-03 DIAGNOSIS — G60.8 SENSORY POLYNEUROPATHY: ICD-10-CM

## 2024-06-03 DIAGNOSIS — E27.40 ADRENAL INSUFFICIENCY (HCC): ICD-10-CM

## 2024-06-03 DIAGNOSIS — M79.2 CHRONIC PERIPHERAL NEUROPATHIC PAIN: Primary | ICD-10-CM

## 2024-06-03 DIAGNOSIS — Z71.9 HEALTH EDUCATION/COUNSELING: ICD-10-CM

## 2024-06-03 DIAGNOSIS — M54.2 CERVICALGIA: ICD-10-CM

## 2024-06-03 PROBLEM — M47.812 CERVICAL SPONDYLOSIS: Status: ACTIVE | Noted: 2020-10-15

## 2024-06-03 PROCEDURE — 3017F COLORECTAL CA SCREEN DOC REV: CPT | Performed by: PSYCHIATRY & NEUROLOGY

## 2024-06-03 PROCEDURE — G8427 DOCREV CUR MEDS BY ELIG CLIN: HCPCS | Performed by: PSYCHIATRY & NEUROLOGY

## 2024-06-03 PROCEDURE — G8417 CALC BMI ABV UP PARAM F/U: HCPCS | Performed by: PSYCHIATRY & NEUROLOGY

## 2024-06-03 PROCEDURE — 1036F TOBACCO NON-USER: CPT | Performed by: PSYCHIATRY & NEUROLOGY

## 2024-06-03 PROCEDURE — G8399 PT W/DXA RESULTS DOCUMENT: HCPCS | Performed by: PSYCHIATRY & NEUROLOGY

## 2024-06-03 PROCEDURE — 1090F PRES/ABSN URINE INCON ASSESS: CPT | Performed by: PSYCHIATRY & NEUROLOGY

## 2024-06-03 PROCEDURE — 99215 OFFICE O/P EST HI 40 MIN: CPT | Performed by: PSYCHIATRY & NEUROLOGY

## 2024-06-03 PROCEDURE — 1123F ACP DISCUSS/DSCN MKR DOCD: CPT | Performed by: PSYCHIATRY & NEUROLOGY

## 2024-06-03 RX ORDER — NORTRIPTYLINE HYDROCHLORIDE 25 MG/1
50 CAPSULE ORAL NIGHTLY
Qty: 180 CAPSULE | Refills: 1 | Status: SHIPPED | OUTPATIENT
Start: 2024-06-03 | End: 2024-11-30

## 2024-06-03 ASSESSMENT — ENCOUNTER SYMPTOMS
EYE PAIN: 0
COUGH: 0
SORE THROAT: 0
TROUBLE SWALLOWING: 0
ABDOMINAL PAIN: 0
SHORTNESS OF BREATH: 0

## 2024-06-03 NOTE — PROGRESS NOTES
[facility] NEUROLOGY FOLLOW-UP NOTE    Patient: Aspen Serrano  Physician: Dominik Castro MD    CC:   Chief Complaint   Patient presents with    Follow-up    Memory Loss       PCP: Roxanne Guzmán APRN - CNP  Referring Provider: No ref. provider found    History of Present Illness:     Interval History on 6/3/2024:  Aspen Serrano is a 74 y.o.  female who presents for follow-up management of neuropathic pain, cognitive changes and insomnia. She seems to have forgotten most of what we discussed during our first encounter, reporting the same issues again, we systematically discussed everything at length while she recorded the encounter on her iPhone. She is no longer taking Keppra, as instructed, EEG was normal.     Patient was made aware that Lyrica and Ambien may have a deleterious impact on her memory function during the daytime, which she reported fluctuates and still bothers her. Last time, I suggested that she replace Ambien with nortriptyline - which can help manage her neuropathic pain and also would not be habit forming, yet helpful to make her drowsy in the evenings. She can stay on 50mg nightly.     Despite taking Lyrica 150mg TID, she continues complaining about burning and pain in the feet and some occasional neck pain and muscle aches in the paraspinal muscles along the spine. She reported using a TENS unit in the past as well. Encouraged her to follow with pain medicine.        Original HPI:  Aspen Serrano is a 73 y.o. 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    Patient denied ever experiencing a \"full-blown seizure\", does not strongly believe she has epilepsy, denies auras, denies FIAS, previously thought to have

## 2024-06-27 ENCOUNTER — APPOINTMENT (RX ONLY)
Dept: URBAN - METROPOLITAN AREA CLINIC 329 | Facility: CLINIC | Age: 74
Setting detail: DERMATOLOGY
End: 2024-06-27

## 2024-06-27 DIAGNOSIS — L57.0 ACTINIC KERATOSIS: ICD-10-CM | Status: INADEQUATELY CONTROLLED

## 2024-06-27 DIAGNOSIS — L71.8 OTHER ROSACEA: ICD-10-CM

## 2024-06-27 DIAGNOSIS — Z85.828 PERSONAL HISTORY OF OTHER MALIGNANT NEOPLASM OF SKIN: ICD-10-CM

## 2024-06-27 DIAGNOSIS — D22 MELANOCYTIC NEVI: ICD-10-CM

## 2024-06-27 DIAGNOSIS — L82.1 OTHER SEBORRHEIC KERATOSIS: ICD-10-CM

## 2024-06-27 DIAGNOSIS — L81.4 OTHER MELANIN HYPERPIGMENTATION: ICD-10-CM

## 2024-06-27 DIAGNOSIS — D18.0 HEMANGIOMA: ICD-10-CM

## 2024-06-27 PROBLEM — D18.01 HEMANGIOMA OF SKIN AND SUBCUTANEOUS TISSUE: Status: ACTIVE | Noted: 2024-06-27

## 2024-06-27 PROBLEM — D22.5 MELANOCYTIC NEVI OF TRUNK: Status: ACTIVE | Noted: 2024-06-27

## 2024-06-27 PROCEDURE — 17003 DESTRUCT PREMALG LES 2-14: CPT

## 2024-06-27 PROCEDURE — 99213 OFFICE O/P EST LOW 20 MIN: CPT | Mod: 25

## 2024-06-27 PROCEDURE — ? SUNSCREEN RECOMMENDATIONS

## 2024-06-27 PROCEDURE — 17000 DESTRUCT PREMALG LESION: CPT

## 2024-06-27 PROCEDURE — ? LIQUID NITROGEN

## 2024-06-27 PROCEDURE — ? FULL BODY SKIN EXAM

## 2024-06-27 PROCEDURE — ? PRESCRIPTION MEDICATION MANAGEMENT

## 2024-06-27 PROCEDURE — ? COUNSELING

## 2024-06-27 PROCEDURE — ? PRESCRIPTION

## 2024-06-27 RX ORDER — METRONIDAZOLE 7.5 MG/G
CREAM TOPICAL
Qty: 45 | Refills: 6 | Status: ERX | COMMUNITY
Start: 2024-06-27

## 2024-06-27 RX ADMIN — METRONIDAZOLE: 7.5 CREAM TOPICAL at 00:00

## 2024-06-27 ASSESSMENT — LOCATION DETAILED DESCRIPTION DERM
LOCATION DETAILED: LEFT MEDIAL MALAR CHEEK
LOCATION DETAILED: NASAL DORSUM
LOCATION DETAILED: LEFT MID-UPPER BACK
LOCATION DETAILED: LEFT INFERIOR CENTRAL MALAR CHEEK
LOCATION DETAILED: LEFT MEDIAL UPPER BACK
LOCATION DETAILED: RIGHT INFERIOR CENTRAL MALAR CHEEK
LOCATION DETAILED: RIGHT CENTRAL ZYGOMA
LOCATION DETAILED: LEFT CENTRAL MALAR CHEEK
LOCATION DETAILED: RIGHT CENTRAL MALAR CHEEK
LOCATION DETAILED: LEFT INFERIOR MEDIAL UPPER BACK

## 2024-06-27 ASSESSMENT — LOCATION SIMPLE DESCRIPTION DERM
LOCATION SIMPLE: NOSE
LOCATION SIMPLE: LEFT UPPER BACK
LOCATION SIMPLE: RIGHT ZYGOMA
LOCATION SIMPLE: LEFT CHEEK
LOCATION SIMPLE: RIGHT CHEEK

## 2024-06-27 ASSESSMENT — LOCATION ZONE DERM
LOCATION ZONE: FACE
LOCATION ZONE: NOSE
LOCATION ZONE: TRUNK

## 2024-06-27 NOTE — PROCEDURE: PRESCRIPTION MEDICATION MANAGEMENT
Render In Strict Bullet Format?: No
Continue Regimen: metronidazole 0.75 % topical cream. Apply to face twice a day
Detail Level: Zone

## 2024-06-27 NOTE — HPI: SKIN LESION
How Severe Is Your Skin Lesion?: mild
Is This A New Presentation, Or A Follow-Up?: Skin Lesions
Additional History: Pt states there is a dry spot on her nose.

## 2024-07-08 ENCOUNTER — APPOINTMENT (RX ONLY)
Dept: URBAN - METROPOLITAN AREA CLINIC 329 | Facility: CLINIC | Age: 74
Setting detail: DERMATOLOGY
End: 2024-07-08

## 2024-07-08 DIAGNOSIS — Z41.9 ENCOUNTER FOR PROCEDURE FOR PURPOSES OTHER THAN REMEDYING HEALTH STATE, UNSPECIFIED: ICD-10-CM

## 2024-07-08 PROCEDURE — ? COSMETIC CONSULTATION: LHR

## 2024-07-08 ASSESSMENT — LOCATION SIMPLE DESCRIPTION DERM: LOCATION SIMPLE: CHIN

## 2024-07-08 ASSESSMENT — LOCATION DETAILED DESCRIPTION DERM: LOCATION DETAILED: LEFT CHIN

## 2024-07-08 ASSESSMENT — LOCATION ZONE DERM: LOCATION ZONE: FACE

## 2024-10-11 ENCOUNTER — TRANSCRIBE ORDERS (OUTPATIENT)
Dept: SCHEDULING | Age: 74
End: 2024-10-11

## 2024-10-11 DIAGNOSIS — Z12.31 OTHER SCREENING MAMMOGRAM: Primary | ICD-10-CM

## 2024-10-15 ENCOUNTER — TELEPHONE (OUTPATIENT)
Dept: NEUROLOGY | Age: 74
End: 2024-10-15

## 2024-10-15 NOTE — TELEPHONE ENCOUNTER
I called Pt reports something is \"covering her left eye\". Pt reports not thinking too well  can't remember what day of the week, ect.  pt reports onset was about 7:00 AM. I told pt she should consider going to the ER.

## 2024-10-16 ENCOUNTER — TELEPHONE (OUTPATIENT)
Dept: NEUROLOGY | Age: 74
End: 2024-10-16

## 2024-10-18 ENCOUNTER — TELEPHONE (OUTPATIENT)
Dept: NEUROLOGY | Age: 74
End: 2024-10-18

## 2024-10-18 NOTE — TELEPHONE ENCOUNTER
[facility] PAIN MANAGEMENT BEHAVIORAL HE[facility]  [address] [facility] [address] [facility] [address] [facility] [address]  [phone]      Jameson Cristina :1976 MRN:[id number]    2018 Time Session Began: 10:05am  Time Session Ended: 10:55am    Session Type:45 Minute Therapy (50123)    Others Present: N/A    Intervention: Behavioral, Cognitive, Supportive    Suicide/Homicide/Violence Ideation: No    If Yes, explain: N/A    Current Outpatient Prescriptions   Medication Sig   • alprazolam (XANAX) 2 MG tablet Take 1 tablet by mouth 3 times daily as needed for Anxiety.   • fluoxetine (PROZAC) 40 MG capsule Take 1 capsule by mouth daily.   • metoPROLOL tartrate (LOPRESSOR) 50 MG tablet Take 1 tablet by mouth 2 times daily.   • oxycodone (ROXICODONE) 30 MG immediate release tablet Take 1 tablet by mouth every 8 hours as needed for Pain.   • OxyMORphone HCl ER (OPANA ER) 30 MG 12 hr tablet Take 30 mg by mouth 3 times daily as needed (chronic low back pain).   • tiZANidine (ZANAFLEX) 4 MG tablet Take 1 tablet by mouth every 8 hours as needed (muscle spasm).   • meloxicam (MOBIC) 15 MG tablet Take 1 tablet by mouth daily.   • ondansetron (ZOFRAN) 4 MG tablet Take 1 tablet by mouth every 12 hours as needed for Nausea.   • atorvastatin (LIPITOR) 10 MG tablet Take 1 tablet by mouth daily.     No current facility-administered medications for this visit.        Change in Medication(s) Reported: No  If Yes, explain: N/A    Patient/Family Education Provided: Yes  Patient/Family Displays Understanding: Yes    If No, explain: N/A    Chief complaint in patient's own words: \"I don't know why I feel like this. I haven't felt like this in a long time.\"    Progress Note containing chief complaint and symptoms/problems related to the complaint:    (Data/Action/Response/Plan)    D: Mr. Cristina discussed how his pain \"has ruined my life.\" He talked about the different ways his pain has negatively impacted his life and  I left a message for her son asking him to call us back in regards to his mothers behavior.    continues to impact his life. He reported he called a couple of  offices about filing a social security disability claim but he would need to make an appointment to talk more with them. He has not done this yet. He discussed how his sleep is still poor. He reported his pain is worse in the mornings. He stated he wakes up during the night but is able to fall back to sleep relatively easily.   A: Processed his feeling regarding his pain. Challenged irrational cognitions about how his life is \"ruined.\" Discussed ways to help challenge these thoughts, encouraged continued use of coping skills. Continued discussion on sleep hygiene from last session.  R: Mr. Cristina's mood was depressed. He teared up throughout the session when talking about his pain. He stated he has not felt like this in a long time, at least a couple of months. He was able to recognize that it was his brain coming up with these thoughts and not his pain that was making him feel bad. He came up with ways to help distract himself such as going for a walk. He reported he will make progress towards submitting a social security disability claim by his next session.  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

## 2024-10-18 NOTE — TELEPHONE ENCOUNTER
Sarah Armenta, a friend of Mrs. Serrano left a message saying she has been helping with her care. Pt friend reports they took her to the ER yesterday for a CT and the hospital told them they need to come in to  see you. I looked in care everywhere and I don't see a recent hospital visit. I am unsure how to help this patient since the friend is not listed as a contact to release medical information too.

## 2024-10-21 ENCOUNTER — TELEPHONE (OUTPATIENT)
Dept: NEUROLOGY | Age: 74
End: 2024-10-21

## 2024-10-21 NOTE — TELEPHONE ENCOUNTER
Son is up to date on his mother condition. He said his mother has an appt with her primary care today to go over her medications. I told son we might be able to set up an earlier appt since she has declined. Pt son reported he would also like to do an MRI as well.

## 2024-10-22 ENCOUNTER — TELEPHONE (OUTPATIENT)
Dept: NEUROLOGY | Age: 74
End: 2024-10-22

## 2024-10-25 ENCOUNTER — HOSPITAL ENCOUNTER (OUTPATIENT)
Age: 74
Discharge: HOME | End: 2024-10-27
Payer: MEDICARE

## 2024-10-25 DIAGNOSIS — R41.0 CONFUSION: ICD-10-CM

## 2024-10-25 PROCEDURE — 70551 MRI BRAIN STEM W/O DYE: CPT

## 2024-11-15 ENCOUNTER — HOSPITAL ENCOUNTER (OUTPATIENT)
Dept: PHYSICAL THERAPY | Age: 74
Setting detail: RECURRING SERIES
Discharge: HOME | End: 2024-11-18
Payer: MEDICARE

## 2024-11-15 DIAGNOSIS — G60.3 IDIOPATHIC PROGRESSIVE NEUROPATHY: ICD-10-CM

## 2024-11-15 DIAGNOSIS — M54.6 PAIN IN THORACIC SPINE: ICD-10-CM

## 2024-11-15 DIAGNOSIS — M54.2 NECK PAIN: Primary | ICD-10-CM

## 2024-11-15 DIAGNOSIS — R26.81 UNSTEADINESS ON FEET: ICD-10-CM

## 2024-11-15 PROCEDURE — 97161 PT EVAL LOW COMPLEX 20 MIN: CPT

## 2024-11-15 ASSESSMENT — PAIN SCALES - GENERAL: PAINLEVEL_OUTOF10: 5

## 2024-11-15 ASSESSMENT — PAIN DESCRIPTION - LOCATION: LOCATION: BACK;NECK

## 2024-11-15 NOTE — PROGRESS NOTES
Aspen Serrano  : 1950  Primary: Medicare Part A And B (Medicare)  Secondary: AETNA SENIOR MEDICARE SUPP [facility] [place] [place] [facility] [address]  Phone: [phone]  Fax: [phone] Plan Frequency: 2x per week (as schedule allows)  Plan of Care/Certification Expiration Date: 25        Plan of Care/Certification Expiration Date:  Plan of Care/Certification Expiration Date: 25    Frequency/Duration:  Plan Frequency: 2x per week (as schedule allows)       Time In/Out:   Time In: 1341  Time Out: 1403      PT Visit Info:    Plan Frequency: 2x per week (as schedule allows)  Total # of Visits to Date: 1  Progress Note Counter: 1      Visit Count: 1    OUTPATIENT PHYSICAL THERAPY:  Treatment Note 11/15/2024       Charge Capture   Episode (Neck and back pain)               Treatment Diagnosis:   Neck pain  Pain in thoracic spine  Unsteadiness on feet  Idiopathic progressive neuropathy  Medical/Referring Diagnosis:    M54.6 - Pain in thoracic spine  M54.12 - Radiculopathy, cervical region  Referring Physician: Lj Cedeño MD MD Orders: PT Eval and Treat  Return MD Appt: TBD  Date of Onset: Chronic  Allergies: Duloxetine, Duloxetine hcl, Lactose, Sucralose, Gabapentin, and Nickel  Restrictions/Precautions:   Fall Precautions: increased risk      Interventions Planned: (Treatment may consist of any combination of the following):  Current Treatment Recommendations: Strengthening; ROM; Balance training; Transfer training; Endurance training; Stair training; Gait training; Neuromuscular re-education; Manual; Pain management; Home exercise program; Safety education & training; Patient/Caregiver education & training; Equipment evaluation, education, & procurement; Modalities; Positioning; Dry needling; Aquatics; Vestibular rehab; Therapeutic activities    Subjective Comments: See eval note from today. She arrived late to her appointment today

## 2024-11-15 NOTE — THERAPY EVALUATION
baths. She says she needs some back support with sitting or when she's trying to sleep due to pain. She reports generalized osteoarthritis all over her body. She enjoys going for walks, but has not been able to recently due to her deficits. She reports a PMH of neuropathy in all extremities, as well as dizziness and unsteadiness on her feet. She had a fall earlier this year when she tripped over a hose at her home where she lives by herself.    Patient Stated Goal(s): \"Relieve my pain\"    Initial Pain Level:   Back, Neck /10   Post Session Pain Level:   Back, Neck 5/10    Past Medical History/Comorbidities:   Ms. Serrano  has a past medical history of Acne rosacea, Agatston CAC score 100-199, Cervical spondylosis, Chronic insomnia, Chronic thoracic spine pain, Colon polyps, Depression, major, single episode, mild (HCC), Dyslipidemia, GERD (gastroesophageal reflux disease), Low back pain with bilateral sciatica, Osteoarthritis, generalized, Sensory neuropathy, Sensory polyneuropathy, Sigmoid diverticulosis, and Small intestinal bacterial overgrowth. Ms. Serrano  has a past surgical history that includes  section (); Colonoscopy (2016); and Upper gastrointestinal endoscopy (, ).    Social History/Living Environment:   Lives with: alone  Type: house  Layout: one level   Access: level entry    Prior Level of Function/Work/Activity:   Prior level of function: Independent  Current level of function: Independent    Learning:   Does the patient/guardian have any barriers to learning?: No barriers  Will there be a co-learner?: No  What is the preferred language of the patient/guardian?: English  Is an  required?: No  How does the patient/guardian prefer to learn new concepts?: Demonstration    Fall Risk Scale:   Botello Total Score: 25    Assessment & Plan    OBJECTIVE     NATURE OF CONDITION Date: 11/15/24 Date:    Highest level of pain 8/10    Lowest level of pain 2/10    Aggravating

## 2024-11-16 ENCOUNTER — HOSPITAL ENCOUNTER (OUTPATIENT)
Dept: MAMMOGRAPHY | Age: 74
Discharge: HOME | End: 2024-11-19
Payer: MEDICARE

## 2024-11-16 VITALS — WEIGHT: 148 LBS | BODY MASS INDEX: 26.22 KG/M2 | HEIGHT: 63 IN

## 2024-11-16 DIAGNOSIS — Z12.31 OTHER SCREENING MAMMOGRAM: ICD-10-CM

## 2024-11-16 PROCEDURE — 77067 SCR MAMMO BI INCL CAD: CPT

## 2024-11-18 ENCOUNTER — HOSPITAL ENCOUNTER (OUTPATIENT)
Dept: PHYSICAL THERAPY | Age: 74
Setting detail: RECURRING SERIES
End: 2024-11-18
Payer: MEDICARE

## 2024-11-20 ENCOUNTER — HOSPITAL ENCOUNTER (OUTPATIENT)
Dept: PHYSICAL THERAPY | Age: 74
Setting detail: RECURRING SERIES
Discharge: HOME | End: 2024-11-23
Payer: MEDICARE

## 2024-11-20 PROCEDURE — 97113 AQUATIC THERAPY/EXERCISES: CPT

## 2024-11-20 NOTE — PROGRESS NOTES
Aspen Serrano  : 1950  Primary: Medicare Part A And B (Medicare)  Secondary: AETNA SENIOR MEDICARE SUPP [facility] @ [facility] [place]  [place] [facility] [address]  Phone: [phone]  Fax: [phone] Plan Frequency: 2x per week (as schedule allows)  Plan of Care/Certification Expiration Date: 25        Plan of Care/Certification Expiration Date:  Plan of Care/Certification Expiration Date: 25    Frequency/Duration:  Plan Frequency: 2x per week (as schedule allows)       Time In/Out:   Time In: 1445  Time Out: 1530      PT Visit Info:    Plan Frequency: 2x per week (as schedule allows)  Total # of Visits to Date: 2  Progress Note Counter: 2      Visit Count: 2    OUTPATIENT PHYSICAL THERAPY:  Treatment Note 2024       Charge Capture   Episode (Neck and back pain)               Treatment Diagnosis:   Neck pain  Pain in thoracic spine  Unsteadiness on feet  Idiopathic progressive neuropathy  Medical/Referring Diagnosis:    M54.6 - Pain in thoracic spine  M54.12 - Radiculopathy, cervical region  Referring Physician: Lj Cedeño MD MD Orders: PT Eval and Treat  Return MD Appt: TBD  Date of Onset: Chronic  Allergies: Duloxetine, Duloxetine hcl, Lactose, Sucralose, Gabapentin, and Nickel  Restrictions/Precautions:   Fall Precautions: increased risk      Interventions Planned: (Treatment may consist of any combination of the following):  Current Treatment Recommendations: Strengthening; ROM; Balance training; Transfer training; Endurance training; Stair training; Gait training; Neuromuscular re-education; Manual; Pain management; Home exercise program; Safety education & training; Patient/Caregiver education & training; Equipment evaluation, education, & procurement; Modalities; Positioning; Dry needling; Aquatics; Vestibular rehab; Therapeutic activities    Subjective Comments:  Pt states she has some memory issues. Complained of thoracic back pain

## 2024-11-25 ENCOUNTER — HOSPITAL ENCOUNTER (OUTPATIENT)
Dept: PHYSICAL THERAPY | Age: 74
Setting detail: RECURRING SERIES
Discharge: HOME | End: 2024-11-28
Payer: MEDICARE

## 2024-11-25 PROCEDURE — 97113 AQUATIC THERAPY/EXERCISES: CPT

## 2024-11-25 NOTE — PROGRESS NOTES
Aspen Serrano  : 1950  Primary: Medicare Part A And B (Medicare)  Secondary: AETNA SENIOR MEDICARE SUPP [facility] @ [facility] [place] [facility] [address]  Phone: [phone]  Fax: [phone] Plan Frequency: 2x per week (as schedule allows)  Plan of Care/Certification Expiration Date: 25        Plan of Care/Certification Expiration Date:  Plan of Care/Certification Expiration Date: 25    Frequency/Duration:  Plan Frequency: 2x per week (as schedule allows)       Time In/Out:   Time In: 1525  Time Out: 1610      PT Visit Info:    Plan Frequency: 2x per week (as schedule allows)  Total # of Visits to Date: 3  Progress Note Counter: 3      Visit Count: 3    OUTPATIENT PHYSICAL THERAPY:  Treatment Note 2024       Charge Capture   Episode (Neck and back pain)               Treatment Diagnosis:   Neck pain  Pain in thoracic spine  Unsteadiness on feet  Idiopathic progressive neuropathy  Medical/Referring Diagnosis:    M54.6 - Pain in thoracic spine  M54.12 - Radiculopathy, cervical region  Referring Physician: Lj Cedeño MD MD Orders: PT Eval and Treat  Return MD Appt: TBD  Date of Onset: Chronic  Allergies: Duloxetine, Duloxetine hcl, Lactose, Sucralose, Gabapentin, and Nickel  Restrictions/Precautions:   Fall Precautions: increased risk      Interventions Planned: (Treatment may consist of any combination of the following):  Current Treatment Recommendations: Strengthening; ROM; Balance training; Transfer training; Endurance training; Stair training; Gait training; Neuromuscular re-education; Manual; Pain management; Home exercise program; Safety education & training; Patient/Caregiver education & training; Equipment evaluation, education, & procurement; Modalities; Positioning; Dry needling; Aquatics; Vestibular rehab; Therapeutic activities    Subjective Comments:  Pt states she does not remember being sore after last PT session.

## 2024-12-02 ENCOUNTER — HOSPITAL ENCOUNTER (OUTPATIENT)
Dept: PHYSICAL THERAPY | Age: 74
Setting detail: RECURRING SERIES
Discharge: HOME | End: 2024-12-05
Payer: MEDICARE

## 2024-12-02 PROCEDURE — 97113 AQUATIC THERAPY/EXERCISES: CPT

## 2024-12-02 NOTE — PROGRESS NOTES
Aspen Serrano  : 1950  Primary: Medicare Part A And B (Medicare)  Secondary: AETNA SENIOR MEDICARE SUPP [facility] [place] [facility] [address]  Phone: [phone]  Fax: [phone] Plan Frequency: 2x per week (as schedule allows)  Plan of Care/Certification Expiration Date: 25        Plan of Care/Certification Expiration Date:  Plan of Care/Certification Expiration Date: 25    Frequency/Duration:  Plan Frequency: 2x per week (as schedule allows)       Time In/Out:   Time In: 1420  Time Out: 1500      PT Visit Info:    Plan Frequency: 2x per week (as schedule allows)  Total # of Visits to Date: 5  Progress Note Counter: 5      Visit Count: 4    OUTPATIENT PHYSICAL THERAPY:  Treatment Note 2024       Charge Capture   Episode (Neck and back pain)               Treatment Diagnosis:   Neck pain  Pain in thoracic spine  Unsteadiness on feet  Idiopathic progressive neuropathy  Medical/Referring Diagnosis:    M54.6 - Pain in thoracic spine  M54.12 - Radiculopathy, cervical region  Referring Physician: Lj Cedeño MD MD Orders: PT Eval and Treat  Return MD Appt: TBD  Date of Onset: Chronic  Allergies: Duloxetine, Duloxetine hcl, Lactose, Sucralose, Gabapentin, and Nickel  Restrictions/Precautions:   Fall Precautions: increased risk      Interventions Planned: (Treatment may consist of any combination of the following):  Current Treatment Recommendations: Strengthening; ROM; Balance training; Transfer training; Endurance training; Stair training; Gait training; Neuromuscular re-education; Manual; Pain management; Home exercise program; Safety education & training; Patient/Caregiver education & training; Equipment evaluation, education, & procurement; Modalities; Positioning; Dry needling; Aquatics; Vestibular rehab; Therapeutic activities    Subjective Comments:  Pt requested to come early today due to day light savings and getting

## 2024-12-04 ENCOUNTER — HOSPITAL ENCOUNTER (OUTPATIENT)
Dept: PHYSICAL THERAPY | Age: 74
Setting detail: RECURRING SERIES
Discharge: HOME | End: 2024-12-07
Payer: MEDICARE

## 2024-12-04 PROCEDURE — 97113 AQUATIC THERAPY/EXERCISES: CPT

## 2024-12-04 NOTE — PROGRESS NOTES
Aspen Serrano  : 1950  Primary: Medicare Part A And B (Medicare)  Secondary: AETNA SENIOR MEDICARE SUPP [facility] [place] [facility] [address]  Phone: [phone]  Fax: [phone] Plan Frequency: 2x per week (as schedule allows)  Plan of Care/Certification Expiration Date: 25        Plan of Care/Certification Expiration Date:  Plan of Care/Certification Expiration Date: 25    Frequency/Duration:  Plan Frequency: 2x per week (as schedule allows)       Time In/Out:   Time In: 1350  Time Out: 1435      PT Visit Info:    Plan Frequency: 2x per week (as schedule allows)  Total # of Visits to Date: 6  Progress Note Counter: 6      Visit Count: 5    OUTPATIENT PHYSICAL THERAPY:  Treatment Note 2024       Charge Capture   Episode (Neck and back pain)               Treatment Diagnosis:   Neck pain  Pain in thoracic spine  Unsteadiness on feet  Idiopathic progressive neuropathy  Medical/Referring Diagnosis:    M54.6 - Pain in thoracic spine  M54.12 - Radiculopathy, cervical region  Referring Physician: Lj Cedeño MD MD Orders: PT Eval and Treat  Return MD Appt: TBD  Date of Onset: Chronic  Allergies: Duloxetine, Duloxetine hcl, Lactose, Sucralose, Gabapentin, and Nickel  Restrictions/Precautions:   Fall Precautions: increased risk      Interventions Planned: (Treatment may consist of any combination of the following):  Current Treatment Recommendations: Strengthening; ROM; Balance training; Transfer training; Endurance training; Stair training; Gait training; Neuromuscular re-education; Manual; Pain management; Home exercise program; Safety education & training; Patient/Caregiver education & training; Equipment evaluation, education, & procurement; Modalities; Positioning; Dry needling; Aquatics; Vestibular rehab; Therapeutic activities    Subjective Comments:  Pt requested to come early today due to day light savings and getting

## 2024-12-09 ENCOUNTER — HOSPITAL ENCOUNTER (OUTPATIENT)
Dept: PHYSICAL THERAPY | Age: 74
Setting detail: RECURRING SERIES
Discharge: HOME | End: 2024-12-12
Payer: MEDICARE

## 2024-12-09 PROCEDURE — 97140 MANUAL THERAPY 1/> REGIONS: CPT

## 2024-12-09 PROCEDURE — 97110 THERAPEUTIC EXERCISES: CPT

## 2024-12-09 ASSESSMENT — PAIN SCALES - GENERAL: PAINLEVEL_OUTOF10: 2

## 2024-12-09 ASSESSMENT — PAIN DESCRIPTION - LOCATION: LOCATION: NECK;BACK

## 2024-12-09 NOTE — PROGRESS NOTES
Aspen Ale Serrano  : 1950  Primary: Medicare Part A And B (Medicare)  Secondary: AETNA SENIOR MEDICARE SUPP [facility] [place]  [place] [facility] [address]  Phone: [phone]  Fax: [phone] Plan Frequency: 2x per week (as schedule allows)    Plan of Care/Certification Expiration Date: 25        Plan of Care/Certification Expiration Date:  Plan of Care/Certification Expiration Date: 25    Frequency/Duration:  Plan Frequency: 2x per week (as schedule allows)       Time In/Out:   Time In: 1353  Time Out: 1431     PT Visit Info:    Plan Frequency: 2x per week (as schedule allows)  Total # of Visits to Date: 7  Progress Note Counter: 1      Visit Count: 7               OUTPATIENT PHYSICAL THERAPY:             Progress Report 2024                 Episode (Neck and back pain)         Treatment Diagnosis:   Neck pain  Pain in thoracic spine  Unsteadiness on feet  Idiopathic progressive neuropathy  Medical/Referring Diagnosis:    M54.6 - Pain in thoracic spine  M54.12 - Radiculopathy, cervical region  Referring Physician: Lj Cedeño MD MD Orders: PT Eval and Treat   Return MD Appt: TBD  Date of Onset: Chronic  Allergies: Duloxetine, Duloxetine hcl, Lactose, Sucralose, Gabapentin, and Nickel  Restrictions/Precautions:    Fall Precautions: increased risk      Medications Last Reviewed: 2024     SUBJECTIVE   History of Injury/Illness (Reason for Referral):  Ms. Serrano presents to outpatient PT with complaints of chronic neck and back pain for a few years now. She says she's received injections in her spine in the past, but is having increased pain again. She says she's not able to accomplish many daily activities secondary to her pain and limited activity tolerance. She says she has tried physical therapy in the past and it seemed to help some of her impairments. She reports she tries to manage her pain with rest and heat/hot 
afternoon.    Initial Pain Level:   Neck, Back 2/10  Post Session Pain Level:   Neck, Back 2/10     Medications Last Reviewed: 12/9/2024    Updated Objective Findings: None Today    Treatment     THERAPEUTIC EXERCISE: (30 minutes): Exercises per grid below to improve mobility, strength, and balance. Required minimal visual, verbal and tactile cues to promote proper body alignment, posture and body mechanics. Progressed resistance, range and repetitions as indicated.     MANUAL THERAPY: (8 minutes): Joint mobilization, soft tissue mobilization and manipulation was utilized and necessary because of the patient's restricted joint motion, painful spasm, loss of articular motion and restricted motion of soft tissue.   - Cervical distraction, upper trap stretching with breathing, soft tissue mobilization, suboccipital release, cervical side glides and P-A's      Date: 11/15/24 Date: 12/9/24 Date:    Activity/Exercise      Assessment / education   HEP review  AD use for safety with gait HEP review  PN assessment    Open books    1 x 15 each side    Seated thoracic rotation    1 x 10 each way  Foam roller between knees                                      HEP:  - Seated Scapular Retraction  - 2 x daily - 7 x weekly - 1 sets - 10-15 reps - 3-5 hold  - Seated Thoracic Rotation  - 2 x daily - 7 x weekly - 1 sets - 10-15 reps - 2 hold  - Sidelying Thoracic Rotation with Open Book  - 2 x daily - 7 x weekly - 1 sets - 10 reps  - Seated Thoracic Extension Over Chair  - 2 x daily - 7 x weekly - 1 sets - 10-15 reps    Treatment/Session Summary:    Treatment Assessment: Patient undergoing a progress note assessment this visit; see other note for details. She verbalized understanding of her HEP handout. PT and patient discussed incorporating some land therapy and aquatic therapy.   Communication/Consultation: None today  Equipment provided: None.   Recommendations / Intent for next treatment session: Next visit will focus on

## 2024-12-11 ENCOUNTER — HOSPITAL ENCOUNTER (OUTPATIENT)
Dept: PHYSICAL THERAPY | Age: 74
Setting detail: RECURRING SERIES
Discharge: HOME | End: 2024-12-14
Payer: MEDICARE

## 2024-12-11 PROCEDURE — 97113 AQUATIC THERAPY/EXERCISES: CPT

## 2024-12-11 NOTE — PROGRESS NOTES
less pain after just 2 visits.    Initial Pain Level:     no VAS /10  Post Session Pain Level:    no VAS  /10    Medications Last Reviewed: 12/11/2024    Updated Objective Findings:  None today     Treatment     Aquatic Therapy (45 minutes):     Aquatic Exercise Log       Date  12/2/24 Date  12/9/24 Date  12/11/24   Activity/ Exercise Parameters Parameters    Walking forward 4 lengths 4 lengths 4 lengths   Walking backward 4 lengths  4 lengths 4 lengths   Walking sideways 4 lengths  4 lengths 4 lengths     Marching 4 lengths  4 lengths 4 lengths     Goose Step 4 lengths  4 lengths 4 lengths     Tip toes 2 lengths  2 lengths 2 lengths     Heels 2 lengths  2 lengths 2 lengths     Lunges 2 lengths  4 lengths 4 lengths   Side step squats       LE Exercises Small noodle  Small noodle  4# wts     Hip Flex/Ext X15 B X15 B X15 B     Hip Abd/Add X15 B X15 B X15 B     Hip IR/ER        Calf raises        Knee Flex X15 B X15 B X15 B     Squats        Leg Circles X15 B each direction X15 B each direction X15 B each direction     Step Ups      UE Exercises        Squeeze In        Push Down        Pull Down        Bicep/Tricep      Rows/Press outs       Chi Positions        Trunk Rotation      Deep H2O/ Noodles Straddled noodle Straddled noodle Noodle under arms     Stabilization        Arms only        Legs only Bicycle 3 min Bicycle 3 min Bicycle 3 min   Cross   Country 2 min 2 min 2 min     Scissors 2 min 2 min 2 min     Crab walk      Lower abdominal   work         Cardio        Jogging      Lap   Swimming        Stretches Small noodle under ankle   Large noodle under ankle      Hamstrings Yes   yes     Heelcords        Piriformis IT band - yes  IT band - yes     Neck        Treatment/Session Summary:    Treatment Assessment:  Pt did well with added weights today.   Communication/Consultation: None today   Equipment provided: None.   Recommendations / Intent for next treatment session: Next visit will focus on

## 2024-12-16 ENCOUNTER — HOSPITAL ENCOUNTER (OUTPATIENT)
Dept: PHYSICAL THERAPY | Age: 74
Setting detail: RECURRING SERIES
Discharge: HOME | End: 2024-12-19
Payer: MEDICARE

## 2024-12-16 PROCEDURE — 97113 AQUATIC THERAPY/EXERCISES: CPT

## 2024-12-16 NOTE — PROGRESS NOTES
Aspen Serrano  : 1950  Primary: Medicare Part A And B (Medicare)  Secondary: AETNA SENIOR MEDICARE SUPP [facility] [place] [facility] [address]  Phone: [phone]  Fax: [phone] Plan Frequency: 2x per week (as schedule allows)  Plan of Care/Certification Expiration Date: 25        Plan of Care/Certification Expiration Date:  Plan of Care/Certification Expiration Date: 25    Frequency/Duration:  Plan Frequency: 2x per week (as schedule allows)       Time In/Out:   Time In: 1445  Time Out: 1530      PT Visit Info:    Plan Frequency: 2x per week (as schedule allows)  Total # of Visits to Date: 9  Progress Note Counter: 3      Visit Count: 8    OUTPATIENT PHYSICAL THERAPY:  Treatment Note 2024       Charge Capture   Episode (Neck and back pain)               Treatment Diagnosis:   Neck pain  Pain in thoracic spine  Unsteadiness on feet  Idiopathic progressive neuropathy  Medical/Referring Diagnosis:    M54.6 - Pain in thoracic spine  M54.12 - Radiculopathy, cervical region  Referring Physician: Lj Cedeño MD MD Orders: PT Eval and Treat  Return MD Appt: TBD  Date of Onset: Chronic  Allergies: Duloxetine, Duloxetine hcl, Lactose, Sucralose, Gabapentin, and Nickel  Restrictions/Precautions:   Fall Precautions: increased risk      Interventions Planned: (Treatment may consist of any combination of the following):  Current Treatment Recommendations: Strengthening; ROM; Balance training; Transfer training; Endurance training; Stair training; Gait training; Neuromuscular re-education; Manual; Pain management; Home exercise program; Safety education & training; Patient/Caregiver education & training; Equipment evaluation, education, & procurement; Modalities; Positioning; Dry needling; Aquatics; Vestibular rehab; Therapeutic activities    Subjective Comments:  Pt reports some neuropathy pain coming back. She thinks that she has

## 2024-12-18 ENCOUNTER — HOSPITAL ENCOUNTER (OUTPATIENT)
Dept: PHYSICAL THERAPY | Age: 74
Setting detail: RECURRING SERIES
Discharge: HOME | End: 2024-12-21
Payer: MEDICARE

## 2024-12-18 PROCEDURE — 97113 AQUATIC THERAPY/EXERCISES: CPT

## 2024-12-18 NOTE — PROGRESS NOTES
visit will focus on progressing resistance as tolerated.    >Total Treatment Billable Duration: 45 minutes     Time In: 1430  Time Out: 1515    Ira Clarke PTA    Charge Capture  [facility] Portal  Appt Desk  Attendance Report     Future Appointments   Date Time Provider Department Center   12/24/2024  8:30 AM Dominik Castro MD [facility]   12/30/2024  1:45 PM Yamel Nj, PTA [facility]   1/2/2025  2:30 PM Antonio Hooks, PT [facility]   1/6/2025  1:00 PM Antonio Hooks, PT [facility]   1/8/2025  1:45 PM Ira Clarke, PTA [facility]   1/13/2025  1:00 PM Antonio Hooks, PT [facility]   1/15/2025  2:30 PM Ira Clarke, PTA [facility]   1/20/2025  1:00 PM Antonio Hooks, PT [facility]   1/22/2025  2:30 PM Ira Clarke, PTA [facility]   1/27/2025  1:00 PM Antonio Hooks, PT [facility]   1/29/2025  1:00 PM Ira Clarke, PTA [facility]   6/3/2025 12:00 PM Dominik Castro MD [facility]

## 2024-12-24 ENCOUNTER — OFFICE VISIT (OUTPATIENT)
Dept: NEUROLOGY | Age: 74
End: 2024-12-24
Payer: MEDICARE

## 2024-12-24 VITALS
BODY MASS INDEX: 26.86 KG/M2 | DIASTOLIC BLOOD PRESSURE: 84 MMHG | SYSTOLIC BLOOD PRESSURE: 129 MMHG | HEIGHT: 63 IN | HEART RATE: 68 BPM | WEIGHT: 151.6 LBS | OXYGEN SATURATION: 97 %

## 2024-12-24 DIAGNOSIS — R41.3 MEMORY DIFFICULTIES: ICD-10-CM

## 2024-12-24 DIAGNOSIS — R41.3 MEMORY DIFFICULTIES: Primary | ICD-10-CM

## 2024-12-24 PROCEDURE — G8427 DOCREV CUR MEDS BY ELIG CLIN: HCPCS | Performed by: PSYCHIATRY & NEUROLOGY

## 2024-12-24 PROCEDURE — 1090F PRES/ABSN URINE INCON ASSESS: CPT | Performed by: PSYCHIATRY & NEUROLOGY

## 2024-12-24 PROCEDURE — 1123F ACP DISCUSS/DSCN MKR DOCD: CPT | Performed by: PSYCHIATRY & NEUROLOGY

## 2024-12-24 PROCEDURE — 1036F TOBACCO NON-USER: CPT | Performed by: PSYCHIATRY & NEUROLOGY

## 2024-12-24 PROCEDURE — 1160F RVW MEDS BY RX/DR IN RCRD: CPT | Performed by: PSYCHIATRY & NEUROLOGY

## 2024-12-24 PROCEDURE — 1159F MED LIST DOCD IN RCRD: CPT | Performed by: PSYCHIATRY & NEUROLOGY

## 2024-12-24 PROCEDURE — 99215 OFFICE O/P EST HI 40 MIN: CPT | Performed by: PSYCHIATRY & NEUROLOGY

## 2024-12-24 PROCEDURE — G8399 PT W/DXA RESULTS DOCUMENT: HCPCS | Performed by: PSYCHIATRY & NEUROLOGY

## 2024-12-24 PROCEDURE — G8417 CALC BMI ABV UP PARAM F/U: HCPCS | Performed by: PSYCHIATRY & NEUROLOGY

## 2024-12-24 PROCEDURE — G8484 FLU IMMUNIZE NO ADMIN: HCPCS | Performed by: PSYCHIATRY & NEUROLOGY

## 2024-12-24 PROCEDURE — 3017F COLORECTAL CA SCREEN DOC REV: CPT | Performed by: PSYCHIATRY & NEUROLOGY

## 2024-12-24 RX ORDER — LOTEPREDNOL ETABONATE 5 MG/ML
SUSPENSION/ DROPS OPHTHALMIC
COMMUNITY
Start: 2024-11-29

## 2024-12-24 RX ORDER — MULTIVITAMIN WITH IRON
TABLET ORAL
COMMUNITY

## 2024-12-24 RX ORDER — MOXIFLOXACIN 5 MG/ML
1 SOLUTION/ DROPS OPHTHALMIC EVERY 8 HOURS
COMMUNITY
Start: 2024-11-29

## 2024-12-24 RX ORDER — CHOLECALCIFEROL (VITAMIN D3) 25 MCG
TABLET,CHEWABLE ORAL DAILY
COMMUNITY

## 2024-12-24 NOTE — PROGRESS NOTES
myopathic process.    For pain symptoms and being refractory to such high doses as Lyrica 150mg TID, with hx of side effects to Cymbalta, I've added nortriptyline to help the neuropathic pain in the past, but this was not effective and we stopped it. She is seen by pain specialist.  I suggested potential decrease in Lyrica during the daytime to help cognitive function.         Return as needed.    Dominik Castro MD  Epilepsy & Consultation Neurology  [address], [address]  [place] [address]  Phone: [phone]    All medications and relevant precautions were fully reviewed with the patient. More than 50% of this visit was used to evaluate, educate and coordinate care for the patient for the above concerns. Total visit time: 44 minutes. Time includes pre- and post- face-to-face time, including record review of available pertinent images and reports. I have written all aspects of this note, parts of which were produced using speech recognition software, which may contain inadvertent errors in the produced words.

## 2024-12-27 LAB — P-TAU181: 1.28 PG/ML (ref 0–0.97)

## 2024-12-28 LAB — IMMUNOLOGIST REVIEW: NORMAL

## 2024-12-30 ENCOUNTER — HOSPITAL ENCOUNTER (OUTPATIENT)
Dept: PHYSICAL THERAPY | Age: 74
Setting detail: RECURRING SERIES
Discharge: HOME | End: 2025-01-02
Payer: MEDICARE

## 2024-12-30 PROCEDURE — 97110 THERAPEUTIC EXERCISES: CPT

## 2024-12-30 NOTE — PROGRESS NOTES
Aspen Serrano  : 1950  Primary: Medicare Part A And B (Medicare)  Secondary: AETNA SENIOR MEDICARE SUPP [facility] [place] [facility] [address]  Phone: [phone]  Fax: [phone] Plan Frequency: 2x per week (as schedule allows)  Plan of Care/Certification Expiration Date: 25        Plan of Care/Certification Expiration Date:  Plan of Care/Certification Expiration Date: 25    Frequency/Duration:  Plan Frequency: 2x per week (as schedule allows)       Time In/Out:   Time In: 1345  Time Out: 1450      PT Visit Info:    Plan Frequency: 2x per week (as schedule allows)  Total # of Visits to Date: 11  Progress Note Counter: 5      Visit Count: 7   OUTPATIENT PHYSICAL THERAPY:  Treatment Note 2024       Charge Capture   Episode (Neck and back pain)               Treatment Diagnosis:   Neck pain  Pain in thoracic spine  Unsteadiness on feet  Idiopathic progressive neuropathy  Medical/Referring Diagnosis:    M54.6 - Pain in thoracic spine  M54.12 - Radiculopathy, cervical region  Referring Physician: Lj Cedeño MD MD Orders: PT Eval and Treat  Return MD Appt: TBD  Date of Onset: Chronic  Allergies: Duloxetine, Duloxetine hcl, Lactose, Strawberry extract, Sucralose, Gabapentin, and Nickel  Restrictions/Precautions:   Fall Precautions: increased risk      Interventions Planned: (Treatment may consist of any combination of the following):  Current Treatment Recommendations: Strengthening; ROM; Balance training; Transfer training; Endurance training; Stair training; Gait training; Neuromuscular re-education; Manual; Pain management; Home exercise program; Safety education & training; Patient/Caregiver education & training; Equipment evaluation, education, & procurement; Modalities; Positioning; Dry needling; Aquatics; Vestibular rehab; Therapeutic activities    Subjective Comments: Patient reports she has been having some neck and

## 2024-12-31 ENCOUNTER — TELEPHONE (OUTPATIENT)
Dept: NEUROLOGY | Age: 74
End: 2024-12-31

## 2025-01-02 ENCOUNTER — HOSPITAL ENCOUNTER (OUTPATIENT)
Dept: PHYSICAL THERAPY | Age: 75
Setting detail: RECURRING SERIES
Discharge: HOME | End: 2025-01-05
Payer: MEDICARE

## 2025-01-02 PROCEDURE — 97110 THERAPEUTIC EXERCISES: CPT

## 2025-01-02 PROCEDURE — 97140 MANUAL THERAPY 1/> REGIONS: CPT

## 2025-01-02 ASSESSMENT — PAIN DESCRIPTION - LOCATION: LOCATION: BACK;NECK

## 2025-01-02 ASSESSMENT — PAIN SCALES - GENERAL: PAINLEVEL_OUTOF10: 2

## 2025-01-02 NOTE — PROGRESS NOTES
Aspen Serrano  : 1950  Primary: Medicare Part A And B (Medicare)  Secondary: AETNA SENIOR MEDICARE SUPP [facility] @ [facility] [place] [facility] [address]  Phone: [phone]  Fax: [phone] Plan Frequency: 2x per week (as schedule allows)  Plan of Care/Certification Expiration Date: 25        Plan of Care/Certification Expiration Date:  Plan of Care/Certification Expiration Date: 25    Frequency/Duration:  Plan Frequency: 2x per week (as schedule allows)       Time In/Out:   Time In: 1431  Time Out: 1510      PT Visit Info:    Plan Frequency: 2x per week (as schedule allows)  Total # of Visits to Date: 12  Progress Note Counter: 6      Visit Count: 12   OUTPATIENT PHYSICAL THERAPY:  Treatment Note 2025       Charge Capture   Episode (Neck and back pain)               Treatment Diagnosis:   Neck pain  Pain in thoracic spine  Unsteadiness on feet  Idiopathic progressive neuropathy  Medical/Referring Diagnosis:    M54.6 - Pain in thoracic spine  M54.12 - Radiculopathy, cervical region  Referring Physician: Lj Cedeño MD MD Orders: PT Eval and Treat  Return MD Appt: TBD  Date of Onset: Chronic  Allergies: Duloxetine, Duloxetine hcl, Lactose, Strawberry extract, Sucralose, Gabapentin, and Nickel  Restrictions/Precautions:   Fall Precautions: increased risk      Interventions Planned: (Treatment may consist of any combination of the following):  Current Treatment Recommendations: Strengthening; ROM; Balance training; Transfer training; Endurance training; Stair training; Gait training; Neuromuscular re-education; Manual; Pain management; Home exercise program; Safety education & training; Patient/Caregiver education & training; Equipment evaluation, education, & procurement; Modalities; Positioning; Dry needling; Aquatics; Vestibular rehab; Therapeutic activities    Subjective Comments: Patient reports she's been doing some work today in

## 2025-01-06 ENCOUNTER — HOSPITAL ENCOUNTER (OUTPATIENT)
Dept: PHYSICAL THERAPY | Age: 75
Setting detail: RECURRING SERIES
Discharge: HOME | End: 2025-01-09
Payer: MEDICARE

## 2025-01-06 PROCEDURE — 97110 THERAPEUTIC EXERCISES: CPT

## 2025-01-06 PROCEDURE — 97140 MANUAL THERAPY 1/> REGIONS: CPT

## 2025-01-06 ASSESSMENT — PAIN DESCRIPTION - LOCATION: LOCATION: BACK;NECK

## 2025-01-06 ASSESSMENT — PAIN SCALES - GENERAL: PAINLEVEL_OUTOF10: 2

## 2025-01-06 NOTE — PROGRESS NOTES
Aspen Serrano  : 1950  Primary: Medicare Part A And B (Medicare)  Secondary: AETNA SENIOR MEDICARE SUPP [facility] @ [facility] [place] [facility] [address]  Phone: [phone]  Fax: [phone] Plan Frequency: 2x per week (as schedule allows)  Plan of Care/Certification Expiration Date: 25        Plan of Care/Certification Expiration Date:  Plan of Care/Certification Expiration Date: 25    Frequency/Duration:  Plan Frequency: 2x per week (as schedule allows)       Time In/Out:   Time In: 1301  Time Out: 1339      PT Visit Info:    Plan Frequency: 2x per week (as schedule allows)  Total # of Visits to Date: 13  Progress Note Counter: 7      Visit Count: 13   OUTPATIENT PHYSICAL THERAPY:  Treatment Note 2025       Charge Capture   Episode (Neck and back pain)               Treatment Diagnosis:   Neck pain  Pain in thoracic spine  Unsteadiness on feet  Idiopathic progressive neuropathy  Medical/Referring Diagnosis:    M54.6 - Pain in thoracic spine  M54.12 - Radiculopathy, cervical region  Referring Physician: Lj Cedeño MD MD Orders: PT Eval and Treat  Return MD Appt: TBD  Date of Onset: Chronic  Allergies: Duloxetine, Duloxetine hcl, Lactose, Strawberry extract, Sucralose, Gabapentin, and Nickel  Restrictions/Precautions:   Fall Precautions: increased risk      Interventions Planned: (Treatment may consist of any combination of the following):  Current Treatment Recommendations: Strengthening; ROM; Balance training; Transfer training; Endurance training; Stair training; Gait training; Neuromuscular re-education; Manual; Pain management; Home exercise program; Safety education & training; Patient/Caregiver education & training; Equipment evaluation, education, & procurement; Modalities; Positioning; Dry needling; Aquatics; Vestibular rehab; Therapeutic activities    Subjective Comments: Patient reports she was doing well this morning, but

## 2025-01-08 ENCOUNTER — HOSPITAL ENCOUNTER (OUTPATIENT)
Dept: PHYSICAL THERAPY | Age: 75
Setting detail: RECURRING SERIES
Discharge: HOME | End: 2025-01-11
Payer: MEDICARE

## 2025-01-08 PROCEDURE — 97113 AQUATIC THERAPY/EXERCISES: CPT

## 2025-01-08 NOTE — PROGRESS NOTES
Aspen Serrano  : 1950  Primary: Medicare Part A And B (Medicare)  Secondary: AETNA SENIOR MEDICARE SUPP [facility] [place] [facility] [address]  Phone: [phone]  Fax: [phone] Plan Frequency: 2x per week (as schedule allows)  Plan of Care/Certification Expiration Date: 25        Plan of Care/Certification Expiration Date:  Plan of Care/Certification Expiration Date: 25    Frequency/Duration:  Plan Frequency: 2x per week (as schedule allows)       Time In/Out:   Time In: 1345  Time Out: 1445      PT Visit Info:    Plan Frequency: 2x per week (as schedule allows)  Total # of Visits to Date: 14  Progress Note Counter: 8      Visit Count: 13    OUTPATIENT PHYSICAL THERAPY:  Treatment Note 2025       Charge Capture   Episode (Neck and back pain)               Treatment Diagnosis:   Neck pain  Pain in thoracic spine  Unsteadiness on feet  Idiopathic progressive neuropathy  Medical/Referring Diagnosis:    M54.6 - Pain in thoracic spine  M54.12 - Radiculopathy, cervical region  Referring Physician: Lj Cedeño MD MD Orders: PT Eval and Treat  Return MD Appt: TBD  Date of Onset: Chronic  Allergies: Duloxetine, Duloxetine hcl, Lactose, Strawberry extract, Sucralose, Gabapentin, and Nickel  Restrictions/Precautions:   Fall Precautions: increased risk      Interventions Planned: (Treatment may consist of any combination of the following):  Current Treatment Recommendations: Strengthening; ROM; Balance training; Transfer training; Endurance training; Stair training; Gait training; Neuromuscular re-education; Manual; Pain management; Home exercise program; Safety education & training; Patient/Caregiver education & training; Equipment evaluation, education, & procurement; Modalities; Positioning; Dry needling; Aquatics; Vestibular rehab; Therapeutic activities    Subjective Comments:  Pt reports increased back pain today due to

## 2025-01-15 ENCOUNTER — HOSPITAL ENCOUNTER (OUTPATIENT)
Dept: PHYSICAL THERAPY | Age: 75
Setting detail: RECURRING SERIES
Discharge: HOME | End: 2025-01-18
Payer: MEDICARE

## 2025-01-15 PROCEDURE — 97113 AQUATIC THERAPY/EXERCISES: CPT

## 2025-01-15 NOTE — PROGRESS NOTES
for next treatment session: Next visit will focus on progressing resistance as tolerated.    >Total Treatment Billable Duration: 50 minutes     Time In: 1440  Time Out: 1530    Ira Clarke PTA    Charge Capture  [facility] Portal  Appt Desk  Attendance Report     Future Appointments   Date Time Provider Department Center   1/17/2025 12:30 PM Antonio Hooks, PT [facility]   1/20/2025  1:00 PM Antonio Hooks, PT [facility]   1/22/2025  2:30 PM Ira Clarke PTA [facility]   1/27/2025  1:00 PM Antonio Hooks, PT [facility]   1/29/2025  1:00 PM Ira Clarke, PTA [facility]   6/3/2025 12:00 PM Dominik Castro MD [facility]

## 2025-01-17 ENCOUNTER — HOSPITAL ENCOUNTER (OUTPATIENT)
Dept: PHYSICAL THERAPY | Age: 75
Setting detail: RECURRING SERIES
Discharge: HOME | End: 2025-01-20
Payer: MEDICARE

## 2025-01-17 PROCEDURE — 97140 MANUAL THERAPY 1/> REGIONS: CPT

## 2025-01-17 PROCEDURE — 97110 THERAPEUTIC EXERCISES: CPT

## 2025-01-17 ASSESSMENT — PAIN DESCRIPTION - LOCATION: LOCATION: NECK

## 2025-01-17 ASSESSMENT — PAIN SCALES - GENERAL: PAINLEVEL_OUTOF10: 1

## 2025-01-17 NOTE — PROGRESS NOTES
Aspen Ale Serrano  : 1950  Primary: Medicare Part A And B (Medicare)  Secondary: AETNA SENIOR MEDICARE SUPP [facility] @ [facility] [place]  [place] [facility] [address]  Phone: [phone]  Fax: [phone] Plan Frequency: 2x per week (as schedule allows)    Plan of Care/Certification Expiration Date: 25        Plan of Care/Certification Expiration Date:  Plan of Care/Certification Expiration Date: 25    Frequency/Duration:  Plan Frequency: 2x per week (as schedule allows)       Time In/Out:   Time In: 1240  Time Out: 1314     PT Visit Info:    Plan Frequency: 2x per week (as schedule allows)  Total # of Visits to Date: 16  Progress Note Counter: 1      Visit Count: 16               OUTPATIENT PHYSICAL THERAPY:             Progress Report 2025                 Episode (Neck and back pain)         Treatment Diagnosis:   Neck pain  Pain in thoracic spine  Unsteadiness on feet  Idiopathic progressive neuropathy  Medical/Referring Diagnosis:    M54.6 - Pain in thoracic spine  M54.12 - Radiculopathy, cervical region  Referring Physician: Lj Cedeño MD MD Orders: PT Eval and Treat   Return MD Appt: TBD  Date of Onset: Chronic  Allergies: Duloxetine, Duloxetine hcl, Lactose, Strawberry extract, Sucralose, Gabapentin, and Nickel  Restrictions/Precautions:    Fall Precautions: increased risk      Medications Last Reviewed: 2025     SUBJECTIVE   History of Injury/Illness (Reason for Referral):  Ms. Serrano presents to outpatient PT with complaints of chronic neck and back pain for a few years now. She says she's received injections in her spine in the past, but is having increased pain again. She says she's not able to accomplish many daily activities secondary to her pain and limited activity tolerance. She says she has tried physical therapy in the past and it seemed to help some of her impairments. She reports she tries to manage her pain with

## 2025-01-17 NOTE — PROGRESS NOTES
Aspen Serrano  : 1950  Primary: Medicare Part A And B (Medicare)  Secondary: AETNA SENIOR MEDICARE SUPP [facility] [place] [facility] [address]  Phone: [phone]  Fax: [phone] Plan Frequency: 2x per week (as schedule allows)  Plan of Care/Certification Expiration Date: 25        Plan of Care/Certification Expiration Date:  Plan of Care/Certification Expiration Date: 25    Frequency/Duration:  Plan Frequency: 2x per week (as schedule allows)       Time In/Out:   Time In: 1240  Time Out: 1314      PT Visit Info:    Plan Frequency: 2x per week (as schedule allows)  Total # of Visits to Date: 16  Progress Note Counter: 1      Visit Count: 16   OUTPATIENT PHYSICAL THERAPY:  Treatment Note 2025       Charge Capture   Episode (Neck and back pain)               Treatment Diagnosis:   Neck pain  Pain in thoracic spine  Unsteadiness on feet  Idiopathic progressive neuropathy  Medical/Referring Diagnosis:    M54.6 - Pain in thoracic spine  M54.12 - Radiculopathy, cervical region  Referring Physician: Lj Cedeño MD MD Orders: PT Eval and Treat  Return MD Appt: TBD  Date of Onset: Chronic  Allergies: Duloxetine, Duloxetine hcl, Lactose, Strawberry extract, Sucralose, Gabapentin, and Nickel  Restrictions/Precautions:   Fall Precautions: increased risk      Interventions Planned: (Treatment may consist of any combination of the following):  Current Treatment Recommendations: Strengthening; ROM; Balance training; Transfer training; Endurance training; Stair training; Gait training; Neuromuscular re-education; Manual; Pain management; Home exercise program; Safety education & training; Patient/Caregiver education & training; Equipment evaluation, education, & procurement; Modalities; Positioning; Dry needling; Aquatics; Vestibular rehab; Therapeutic activities    Subjective Comments: Patient reports she is having mild pain

## 2025-01-20 ENCOUNTER — HOSPITAL ENCOUNTER (OUTPATIENT)
Dept: PHYSICAL THERAPY | Age: 75
Setting detail: RECURRING SERIES
Discharge: HOME | End: 2025-01-23
Payer: MEDICARE

## 2025-01-20 PROCEDURE — 97140 MANUAL THERAPY 1/> REGIONS: CPT

## 2025-01-20 PROCEDURE — 97110 THERAPEUTIC EXERCISES: CPT

## 2025-01-20 ASSESSMENT — PAIN DESCRIPTION - LOCATION: LOCATION: NECK

## 2025-01-20 ASSESSMENT — PAIN SCALES - GENERAL: PAINLEVEL_OUTOF10: 3

## 2025-01-20 NOTE — PROGRESS NOTES
Aspen Serrano  : 1950  Primary: Medicare Part A And B (Medicare)  Secondary: [employer] SENIOR MEDICARE SUPP [facility] [place] [facility] [address]  Phone: [phone]  Fax: [phone] Plan Frequency: 2x per week (as schedule allows)  Plan of Care/Certification Expiration Date: 25        Plan of Care/Certification Expiration Date:  Plan of Care/Certification Expiration Date: 25    Frequency/Duration:  Plan Frequency: 2x per week (as schedule allows)       Time In/Out:   Time In: 1302  Time Out: 1342      PT Visit Info:    Plan Frequency: 2x per week (as schedule allows)  Total # of Visits to Date: 17  Progress Note Counter: 2      Visit Count: 17   OUTPATIENT PHYSICAL THERAPY:  Treatment Note 2025       Charge Capture   Episode (Neck and back pain)               Treatment Diagnosis:   Neck pain  Pain in thoracic spine  Unsteadiness on feet  Idiopathic progressive neuropathy  Medical/Referring Diagnosis:    M54.6 - Pain in thoracic spine  M54.12 - Radiculopathy, cervical region  Referring Physician: Lj Cedeño MD MD Orders: PT Eval and Treat  Return MD Appt: TBD  Date of Onset: Chronic  Allergies: Duloxetine, Duloxetine hcl, Lactose, Strawberry extract, Sucralose, Gabapentin, and Nickel  Restrictions/Precautions:   Fall Precautions: increased risk      Interventions Planned: (Treatment may consist of any combination of the following):  Current Treatment Recommendations: Strengthening; ROM; Balance training; Transfer training; Endurance training; Stair training; Gait training; Neuromuscular re-education; Manual; Pain management; Home exercise program; Safety education & training; Patient/Caregiver education & training; Equipment evaluation, education, & procurement; Modalities; Positioning; Dry needling; Aquatics; Vestibular rehab; Therapeutic activities    Subjective Comments: Patient reports she is feeling very achy

## 2025-01-21 ENCOUNTER — TELEPHONE (OUTPATIENT)
Dept: NEUROLOGY | Age: 75
End: 2025-01-21

## 2025-01-21 NOTE — TELEPHONE ENCOUNTER
Explained results of elevated P-Tau protein today, and it is suggestive of Alzheimer's dementia.      Recommend patient to follow-up in call the [facility]  Phone: [phone]   I recommend her to call to ensure that she has an upcoming appointment.    As I explained, neurocognitive specialists would need to decide whether we need repeat or additional labs, possible additional imaging, or alternative therapies that can be suggested.    Patient reported previous history of side effects to Cymbalta, unclear what dose she was on.    She previously mentioned difficulty falling asleep.  We may consider recommending Remeron (mirtazapine) 7.5 to 15 mg nightly.    I will see the patient once again for neuropathy follow-up in June, however I expect her to continue with pain management for this symptomatic management.

## 2025-01-22 ENCOUNTER — APPOINTMENT (OUTPATIENT)
Dept: PHYSICAL THERAPY | Age: 75
End: 2025-01-22
Payer: MEDICARE

## 2025-01-27 ENCOUNTER — APPOINTMENT (OUTPATIENT)
Dept: PHYSICAL THERAPY | Age: 75
End: 2025-01-27
Payer: MEDICARE

## 2025-01-29 ENCOUNTER — HOSPITAL ENCOUNTER (OUTPATIENT)
Dept: PHYSICAL THERAPY | Age: 75
Setting detail: RECURRING SERIES
Discharge: HOME | End: 2025-02-01
Payer: MEDICARE

## 2025-01-29 PROCEDURE — 97113 AQUATIC THERAPY/EXERCISES: CPT

## 2025-01-29 NOTE — PROGRESS NOTES
Aspen Serrano  : 1950  Primary: Medicare Part A And B (Medicare)  Secondary: AET[employer] SENIOR MEDICARE SUPP [facility] [place]  [place] [facility] [address]  Phone: [phone]  Fax: [phone] Plan Frequency: 2x per week (as schedule allows)  Plan of Care/Certification Expiration Date: 25        Plan of Care/Certification Expiration Date:  Plan of Care/Certification Expiration Date: 25    Frequency/Duration:  Plan Frequency: 2x per week (as schedule allows)       Time In/Out:   Time In: 1300  Time Out: 1345      PT Visit Info:    Plan Frequency: 2x per week (as schedule allows)  Total # of Visits to Date: 18  Progress Note Counter: 3      Visit Count: 17    OUTPATIENT PHYSICAL THERAPY:  Treatment Note 2025       Charge Capture   Episode (Neck and back pain)               Treatment Diagnosis:   Neck pain  Pain in thoracic spine  Unsteadiness on feet  Idiopathic progressive neuropathy  Medical/Referring Diagnosis:    M54.6 - Pain in thoracic spine  M54.12 - Radiculopathy, cervical region  Referring Physician: Lj Cedeño MD MD Orders: PT Eval and Treat  Return MD Appt: TBD  Date of Onset: Chronic  Allergies: Duloxetine, Duloxetine hcl, Lactose, Strawberry extract, Sucralose, Gabapentin, and Nickel  Restrictions/Precautions:   Fall Precautions: increased risk      Interventions Planned: (Treatment may consist of any combination of the following):  Current Treatment Recommendations: Strengthening; ROM; Balance training; Transfer training; Endurance training; Stair training; Gait training; Neuromuscular re-education; Manual; Pain management; Home exercise program; Safety education & training; Patient/Caregiver education & training; Equipment evaluation, education, & procurement; Modalities; Positioning; Dry needling; Aquatics; Vestibular rehab; Therapeutic activities    Subjective Comments:  Pt reports she was diagnosed with alzheimer's

## 2025-01-31 ENCOUNTER — HOSPITAL ENCOUNTER (OUTPATIENT)
Dept: PHYSICAL THERAPY | Age: 75
Setting detail: RECURRING SERIES
End: 2025-01-31
Payer: MEDICARE

## 2025-01-31 PROCEDURE — 97140 MANUAL THERAPY 1/> REGIONS: CPT

## 2025-01-31 PROCEDURE — 97110 THERAPEUTIC EXERCISES: CPT

## 2025-01-31 ASSESSMENT — PAIN DESCRIPTION - LOCATION: LOCATION: SHOULDER

## 2025-01-31 ASSESSMENT — PAIN SCALES - GENERAL: PAINLEVEL_OUTOF10: 2

## 2025-01-31 NOTE — PROGRESS NOTES
Aspen Ale Serrano  : 1950  Primary: Medicare Part A And B  Secondary: AETNA SENIOR MEDICARE SUPP [facility] [place]  [facility] [place] [facility] [address]  Phone: [phone]  Fax: [phone] Plan Frequency: 2x per week (as schedule allows)    Plan of Care/Certification Expiration Date: 25      PT Visit Info:  Total # of Visits to Date: 19  Progress Note Counter: 4         OUTPATIENT PHYSICAL THERAPY 2025     Appt Desk   Episode   MyChart      DISCONTINUATION SUMMARY: Ms. Serrano participated in 19 physical therapy visit(s). Patient will continue to work on her exercises at home independently and let us know if she needs to return to the clinic in the future. The current episode of care will be closed at this time.     Thank you for this referral.     Antonio Hooks, PT, DPT

## 2025-01-31 NOTE — PROGRESS NOTES
Aspen Serrano  : 1950  Primary: Medicare Part A And B (Medicare)  Secondary: AETNA SENIOR MEDICARE SUPP [facility] [place] [facility] [address]  Phone: [phone]  Fax: [phone] Plan Frequency: 2x per week (as schedule allows)  Plan of Care/Certification Expiration Date: 25        Plan of Care/Certification Expiration Date:  Plan of Care/Certification Expiration Date: 25    Frequency/Duration:  Plan Frequency: 2x per week (as schedule allows)       Time In/Out:   Time In: 1017  Time Out: 1058      PT Visit Info:    Plan Frequency: 2x per week (as schedule allows)  Total # of Visits to Date: 19  Progress Note Counter: 4      Visit Count: 19   OUTPATIENT PHYSICAL THERAPY:  Treatment Note 2025       Charge Capture   Episode (Neck and back pain)               Treatment Diagnosis:   Neck pain  Pain in thoracic spine  Unsteadiness on feet  Idiopathic progressive neuropathy  Medical/Referring Diagnosis:    M54.6 - Pain in thoracic spine  M54.12 - Radiculopathy, cervical region  Referring Physician: Lj Cedeño MD MD Orders: PT Eval and Treat  Return MD Appt: TBD  Date of Onset: Chronic  Allergies: Duloxetine, Duloxetine hcl, Lactose, Strawberry extract, Sucralose, Gabapentin, and Nickel  Restrictions/Precautions:   Fall Precautions: increased risk      Interventions Planned: (Treatment may consist of any combination of the following):  Current Treatment Recommendations: Strengthening; ROM; Balance training; Transfer training; Endurance training; Stair training; Gait training; Neuromuscular re-education; Manual; Pain management; Home exercise program; Safety education & training; Patient/Caregiver education & training; Equipment evaluation, education, & procurement; Modalities; Positioning; Dry needling; Aquatics; Vestibular rehab; Therapeutic activities    Subjective Comments: Patient reports she is having some pain around her  sets - 10 reps  - Seated Thoracic Extension Over Chair  - 2 x daily - 7 x weekly - 1 sets - 10-15 reps  - Serratus Punch  - 1 x daily - 3 x weekly - 3 sets - 10 reps  - Prone T's  - 1 x daily - 3 x weekly - 3 sets - 10 reps  - Prone Shoulder Row  - 1 x daily - 3 x weekly - 3 sets - 10 reps    Treatment/Session Summary:    Treatment Assessment: Patient tolerated therapy well this morning. She demonstrated understanding of new exercises today. PT and patient discussed having having her work on her exercises independently at home for a while to have some autonomy with all she has learned in the pool and in the clinic. She says she has memory deficits, so she is not always consistent with her HEP performance. PT encouraged her to try and use a log and daily reminders to work on her mobility/strength if that will aid her. She verbalized understanding of instruction and will let us know if she needs to return to the clinic in the future.   Communication/Consultation: None today  Equipment provided: Green and blue therpatriciad  Recommendations / Intent for next treatment session: Next visit will focus on cervicothoracic spine mobility & scapular strength.    >Total Treatment Billable Duration: 41 minutes  Time In: 1017  Time Out: 1058    Antonio Hooks PT    Charge Capture  [employer] Portal  Appt Desk  Attendance Report     Future Appointments   Date Time Provider Department Center   6/3/2025 12:00 PM Dominik Castro MD [facility]

## 2025-04-09 ENCOUNTER — TELEPHONE (OUTPATIENT)
Dept: ORTHOPEDIC SURGERY | Age: 75
End: 2025-04-09

## 2025-04-09 NOTE — TELEPHONE ENCOUNTER
Returned call and reached voice mail. Informed her that we have not record of an allergy for celebrex. I couldn't find records where it was prescribed in 2018. I did tell her to discuss this with her pcp.

## 2025-04-09 NOTE — TELEPHONE ENCOUNTER
Patient has a question about a medication from 2018, she says she was written a prescription I believe she said for celebrex and she had a reaction to it back then and she is asking if we have documentation of the reaction? She has a doctor now that has written a prescription for it and she doesn't want to have it filled if that is the case, I told her I wasn't sure if we we would have a notation of that from back then but I would ask you to let her know. She's not sure if it was [facility] or NANCY that may have written for it.

## 2025-06-03 ENCOUNTER — OFFICE VISIT (OUTPATIENT)
Dept: NEUROLOGY | Age: 75
End: 2025-06-03
Payer: MEDICARE

## 2025-06-03 VITALS
DIASTOLIC BLOOD PRESSURE: 69 MMHG | HEIGHT: 63 IN | HEART RATE: 97 BPM | BODY MASS INDEX: 27.46 KG/M2 | WEIGHT: 155 LBS | SYSTOLIC BLOOD PRESSURE: 102 MMHG

## 2025-06-03 DIAGNOSIS — G89.29 CHRONIC PERIPHERAL NEUROPATHIC PAIN: ICD-10-CM

## 2025-06-03 DIAGNOSIS — R41.3 MEMORY DIFFICULTIES: Primary | ICD-10-CM

## 2025-06-03 DIAGNOSIS — M79.2 CHRONIC PERIPHERAL NEUROPATHIC PAIN: ICD-10-CM

## 2025-06-03 PROCEDURE — 3017F COLORECTAL CA SCREEN DOC REV: CPT | Performed by: PSYCHIATRY & NEUROLOGY

## 2025-06-03 PROCEDURE — G8417 CALC BMI ABV UP PARAM F/U: HCPCS | Performed by: PSYCHIATRY & NEUROLOGY

## 2025-06-03 PROCEDURE — G8399 PT W/DXA RESULTS DOCUMENT: HCPCS | Performed by: PSYCHIATRY & NEUROLOGY

## 2025-06-03 PROCEDURE — G8427 DOCREV CUR MEDS BY ELIG CLIN: HCPCS | Performed by: PSYCHIATRY & NEUROLOGY

## 2025-06-03 PROCEDURE — 1159F MED LIST DOCD IN RCRD: CPT | Performed by: PSYCHIATRY & NEUROLOGY

## 2025-06-03 PROCEDURE — 1090F PRES/ABSN URINE INCON ASSESS: CPT | Performed by: PSYCHIATRY & NEUROLOGY

## 2025-06-03 PROCEDURE — 1123F ACP DISCUSS/DSCN MKR DOCD: CPT | Performed by: PSYCHIATRY & NEUROLOGY

## 2025-06-03 PROCEDURE — 1036F TOBACCO NON-USER: CPT | Performed by: PSYCHIATRY & NEUROLOGY

## 2025-06-03 PROCEDURE — 99215 OFFICE O/P EST HI 40 MIN: CPT | Performed by: PSYCHIATRY & NEUROLOGY

## 2025-06-03 RX ORDER — DONEPEZIL HYDROCHLORIDE 10 MG/1
10 TABLET, FILM COATED ORAL NIGHTLY
COMMUNITY

## 2025-06-03 NOTE — PROGRESS NOTES
[facility] FOLLOW-UP NOTE    Patient: Aspen Serrano  Physician: Dominik Castro MD    CC:   Chief Complaint   Patient presents with    Follow-up     Neuropathy worse since last visit       PCP: Roxanne Guzmán APRN - CNP  Referring Provider: No ref. provider found    History of Present Illness:     Interval History on 6/3/2025:  Aspen Serrano is a 75 y.o. female who presents for follow-up management of neuropathy.  We discussed Lyrica dosing and I suggested trying to dose it in the evening/night time and skip the morning dose, however, better to discuss this with pain management.  She is providing history on her own.  No major decline in cognition, some forgetfulness persists.  Seeing Dr. Nj for dementia.      Prior relevant documentation:  Interval History on 12/24/2024:  Aspen Serrano is a 74 y.o. female who presents for follow-up management of neuropathy and concerns with regards to cognitive changes.  She is here with her daughter-in-law.  Patient reports she is established with [facility] success in [facility], she will proceed to do cognitive evaluations and interval checks there.     Patient continues to be alert and attentive to the conversation, good historian, coherent speech and relevant answers.  Does not need any repetition during the interview.  She may have a mild degree of memory dysfunction and reports some episodes of forgetfulness.  She is willing to decrease Lyrica to improve her daytime cognitive function, advised to lower to 150 mg twice a day and to avoid morning dose if possible.  Another possibility is to lower Lyrica to 50 or 75 mg doses during the daytime with a higher dose in the evening such as 150 mg after dinner.    Her neuropathic pain symptoms are relatively stable.    Explained that it is possible to add Cymbalta or other SNRI for neuropathic pain during the daytime, defer such decisions to the pain doctor.  I advised against using